# Patient Record
Sex: FEMALE | Race: OTHER | Employment: FULL TIME | URBAN - METROPOLITAN AREA
[De-identification: names, ages, dates, MRNs, and addresses within clinical notes are randomized per-mention and may not be internally consistent; named-entity substitution may affect disease eponyms.]

---

## 2019-01-10 ENCOUNTER — LAB ENCOUNTER (OUTPATIENT)
Dept: LAB | Facility: REFERENCE LAB | Age: 39
End: 2019-01-10
Attending: OBSTETRICS & GYNECOLOGY
Payer: COMMERCIAL

## 2019-01-10 ENCOUNTER — INITIAL PRENATAL (OUTPATIENT)
Dept: OBGYN CLINIC | Facility: CLINIC | Age: 39
End: 2019-01-10

## 2019-01-10 VITALS
HEIGHT: 62 IN | DIASTOLIC BLOOD PRESSURE: 80 MMHG | WEIGHT: 252.81 LBS | SYSTOLIC BLOOD PRESSURE: 116 MMHG | BODY MASS INDEX: 46.52 KG/M2

## 2019-01-10 DIAGNOSIS — Z36.9 UNSPECIFIED ANTENATAL SCREENING: ICD-10-CM

## 2019-01-10 DIAGNOSIS — E28.2 PCOS (POLYCYSTIC OVARIAN SYNDROME): ICD-10-CM

## 2019-01-10 DIAGNOSIS — O09.519 SUPERVISION OF HIGH-RISK PREGNANCY OF ELDERLY PRIMIGRAVIDA: ICD-10-CM

## 2019-01-10 DIAGNOSIS — K58.9 IRRITABLE BOWEL SYNDROME, UNSPECIFIED TYPE: ICD-10-CM

## 2019-01-10 DIAGNOSIS — Z11.3 SCREENING EXAMINATION FOR VENEREAL DISEASE: ICD-10-CM

## 2019-01-10 DIAGNOSIS — Z12.4 SCREENING FOR MALIGNANT NEOPLASM OF THE CERVIX: ICD-10-CM

## 2019-01-10 DIAGNOSIS — O16.1 HYPERTENSION AFFECTING PREGNANCY IN FIRST TRIMESTER: ICD-10-CM

## 2019-01-10 DIAGNOSIS — E66.01 OBESITY, CLASS III, BMI 40-49.9 (MORBID OBESITY) (HCC): ICD-10-CM

## 2019-01-10 DIAGNOSIS — O09.811 PREGNANCY RESULTING FROM IN VITRO FERTILIZATION IN FIRST TRIMESTER: ICD-10-CM

## 2019-01-10 DIAGNOSIS — Z36.9 UNSPECIFIED ANTENATAL SCREENING: Primary | ICD-10-CM

## 2019-01-10 PROBLEM — E66.813 OBESITY, CLASS III, BMI 40-49.9 (MORBID OBESITY): Status: ACTIVE | Noted: 2019-01-10

## 2019-01-10 LAB
ANTIBODY SCREEN: NEGATIVE
APPEARANCE: CLEAR
BASOPHILS # BLD: 0.1 K/UL (ref 0–0.2)
BASOPHILS NFR BLD: 1 %
EOSINOPHIL # BLD: 0.2 K/UL (ref 0–0.7)
EOSINOPHIL NFR BLD: 3 %
ERYTHROCYTE [DISTWIDTH] IN BLOOD BY AUTOMATED COUNT: 13.3 % (ref 11–15)
EST. AVERAGE GLUCOSE BLD GHB EST-MCNC: 111 MG/DL (ref 68–126)
GLUCOSE 1H P 50 G GLC PO SERPL-MCNC: 170 MG/DL
HBA1C MFR BLD HPLC: 5.5 % (ref ?–5.7)
HCT VFR BLD AUTO: 36.1 % (ref 35–48)
HGB BLD-MCNC: 11.9 G/DL (ref 12–16)
LYMPHOCYTES # BLD: 1.9 K/UL (ref 1–4)
LYMPHOCYTES NFR BLD: 20 %
MCH RBC QN AUTO: 29.5 PG (ref 27–32)
MCHC RBC AUTO-ENTMCNC: 32.9 G/DL (ref 32–37)
MCV RBC AUTO: 89.7 FL (ref 80–100)
MONOCYTES # BLD: 0.4 K/UL (ref 0–1)
MONOCYTES NFR BLD: 5 %
MULTISTIX LOT#: NORMAL NUMERIC
NEUTROPHILS # BLD AUTO: 6.8 K/UL (ref 1.8–7.7)
NEUTROPHILS NFR BLD: 72 %
PH, URINE: 5.5 (ref 4.5–8)
PLATELET # BLD AUTO: 221 K/UL (ref 140–400)
PMV BLD AUTO: 8.4 FL (ref 7.4–10.3)
RBC # BLD AUTO: 4.02 M/UL (ref 3.7–5.4)
RH BLOOD TYPE: POSITIVE
RUBV IGG SER-ACNC: 16.1 IU/ML
SPECIFIC GRAVITY: 1.03 (ref 1–1.03)
URINE-COLOR: YELLOW
UROBILINOGEN,SEMI-QN: 0.2 MG/DL (ref 0–1.9)
WBC # BLD AUTO: 9.5 K/UL (ref 4–11)

## 2019-01-10 PROCEDURE — 86901 BLOOD TYPING SEROLOGIC RH(D): CPT | Performed by: OBSTETRICS & GYNECOLOGY

## 2019-01-10 PROCEDURE — 87591 N.GONORRHOEAE DNA AMP PROB: CPT | Performed by: OBSTETRICS & GYNECOLOGY

## 2019-01-10 PROCEDURE — 86850 RBC ANTIBODY SCREEN: CPT

## 2019-01-10 PROCEDURE — 85025 COMPLETE CBC W/AUTO DIFF WBC: CPT

## 2019-01-10 PROCEDURE — 86762 RUBELLA ANTIBODY: CPT

## 2019-01-10 PROCEDURE — 87340 HEPATITIS B SURFACE AG IA: CPT

## 2019-01-10 PROCEDURE — 81002 URINALYSIS NONAUTO W/O SCOPE: CPT | Performed by: OBSTETRICS & GYNECOLOGY

## 2019-01-10 PROCEDURE — 88175 CYTOPATH C/V AUTO FLUID REDO: CPT | Performed by: OBSTETRICS & GYNECOLOGY

## 2019-01-10 PROCEDURE — 82950 GLUCOSE TEST: CPT

## 2019-01-10 PROCEDURE — 87086 URINE CULTURE/COLONY COUNT: CPT | Performed by: OBSTETRICS & GYNECOLOGY

## 2019-01-10 PROCEDURE — 36415 COLL VENOUS BLD VENIPUNCTURE: CPT

## 2019-01-10 PROCEDURE — 86780 TREPONEMA PALLIDUM: CPT

## 2019-01-10 PROCEDURE — 87491 CHLMYD TRACH DNA AMP PROBE: CPT | Performed by: OBSTETRICS & GYNECOLOGY

## 2019-01-10 PROCEDURE — 86900 BLOOD TYPING SEROLOGIC ABO: CPT | Performed by: OBSTETRICS & GYNECOLOGY

## 2019-01-10 PROCEDURE — 83036 HEMOGLOBIN GLYCOSYLATED A1C: CPT

## 2019-01-10 PROCEDURE — 87624 HPV HI-RISK TYP POOLED RSLT: CPT | Performed by: OBSTETRICS & GYNECOLOGY

## 2019-01-10 PROCEDURE — 87389 HIV-1 AG W/HIV-1&-2 AB AG IA: CPT

## 2019-01-10 RX ORDER — METFORMIN HYDROCHLORIDE 500 MG/1
500 TABLET, EXTENDED RELEASE ORAL 2 TIMES DAILY WITH MEALS
COMMUNITY
Start: 2018-11-27 | End: 2019-04-19

## 2019-01-10 RX ORDER — LABETALOL 200 MG/1
TABLET, FILM COATED ORAL
COMMUNITY
Start: 2018-11-05 | End: 2019-01-10

## 2019-01-10 RX ORDER — LABETALOL 300 MG/1
TABLET, FILM COATED ORAL
Qty: 90 TABLET | Refills: 3 | Status: SHIPPED | OUTPATIENT
Start: 2019-01-10 | End: 2019-04-19

## 2019-01-11 ENCOUNTER — TELEPHONE (OUTPATIENT)
Dept: OBGYN CLINIC | Facility: CLINIC | Age: 39
End: 2019-01-11

## 2019-01-11 LAB
C TRACH DNA SPEC QL NAA+PROBE: NEGATIVE
HBV SURFACE AG SERPL QL IA: NONREACTIVE
HIV1+2 AB SERPL QL IA: NONREACTIVE
HPV I/H RISK 1 DNA SPEC QL NAA+PROBE: NEGATIVE
N GONORRHOEA DNA SPEC QL NAA+PROBE: NEGATIVE
T PALLIDUM AB SER QL: NEGATIVE

## 2019-01-11 NOTE — PROGRESS NOTES
Spoke with pt results of 1 hour gtt given to pt with instructions from Dr Enrico Alfaro for pt to see Endocrinologist to discuss beginning the diabetic diet and to review the best treatment options for your gestational diabetes throughout the pregnancy.  Pt voice

## 2019-01-11 NOTE — TELEPHONE ENCOUNTER
Spoke with pt results of 1 hour gtt given to pt with instructions from Dr Irving Bowles for pt to see Endocrinologist to discuss beginning the diabetic diet and to review the best treatment options for your gestational diabetes throughout the pregnancy.  Pt voice

## 2019-01-11 NOTE — TELEPHONE ENCOUNTER
Notes recorded by Chuck Shin MD on 1/11/2019 at 11:05 AM CST  Your lab results show an abnormal glucose tolerance test despite being on Metformin with a normal HgA1C.    Please schedule an appointment with your Endocrinologist to discuss beginning t

## 2019-01-15 ENCOUNTER — TELEPHONE (OUTPATIENT)
Dept: OBGYN CLINIC | Facility: CLINIC | Age: 39
End: 2019-01-15

## 2019-01-15 NOTE — TELEPHONE ENCOUNTER
Spoke with patient states her endo Dr Shelbi Rogers told her she needs to see a nutritionist. Pt has upcoming appointment with Dr Sherry Deng on 01/23/2019 will wait until then to discuss this with Dr MEADOWS as pt will also need to change endo because of insurance.  Pt

## 2019-01-23 ENCOUNTER — ROUTINE PRENATAL (OUTPATIENT)
Dept: OBGYN CLINIC | Facility: CLINIC | Age: 39
End: 2019-01-23

## 2019-01-23 VITALS
BODY MASS INDEX: 46.76 KG/M2 | WEIGHT: 254.13 LBS | HEIGHT: 62 IN | DIASTOLIC BLOOD PRESSURE: 88 MMHG | SYSTOLIC BLOOD PRESSURE: 130 MMHG

## 2019-01-23 DIAGNOSIS — O26.899 PREGNANCY COMPLICATED BY INSULIN RESISTANCE: ICD-10-CM

## 2019-01-23 DIAGNOSIS — E28.2 PCOS (POLYCYSTIC OVARIAN SYNDROME): ICD-10-CM

## 2019-01-23 DIAGNOSIS — N89.8 VAGINAL DISCHARGE: ICD-10-CM

## 2019-01-23 DIAGNOSIS — E88.81 INSULIN RESISTANCE: Primary | ICD-10-CM

## 2019-01-23 DIAGNOSIS — O09.519 SUPERVISION OF HIGH-RISK PREGNANCY OF ELDERLY PRIMIGRAVIDA: ICD-10-CM

## 2019-01-23 DIAGNOSIS — O09.511 PRIMIGRAVIDA OF ADVANCED MATERNAL AGE IN FIRST TRIMESTER: ICD-10-CM

## 2019-01-23 PROBLEM — E88.819: Status: ACTIVE | Noted: 2019-01-23

## 2019-01-23 PROBLEM — E88.819 PREGNANCY COMPLICATED BY INSULIN RESISTANCE: Status: ACTIVE | Noted: 2019-01-23

## 2019-01-23 PROCEDURE — 87106 FUNGI IDENTIFICATION YEAST: CPT | Performed by: OBSTETRICS & GYNECOLOGY

## 2019-01-23 PROCEDURE — 87808 TRICHOMONAS ASSAY W/OPTIC: CPT | Performed by: OBSTETRICS & GYNECOLOGY

## 2019-01-23 PROCEDURE — 87205 SMEAR GRAM STAIN: CPT | Performed by: OBSTETRICS & GYNECOLOGY

## 2019-01-23 RX ORDER — MECLIZINE HCL 12.5 MG/1
TABLET ORAL
COMMUNITY
Start: 2019-01-20 | End: 2019-02-14

## 2019-01-23 RX ORDER — CEPHALEXIN 500 MG/1
CAPSULE ORAL
COMMUNITY
Start: 2019-01-20 | End: 2019-02-14

## 2019-01-24 NOTE — PROGRESS NOTES
Reviewed results of failed early GTT of 170 and normal A1C consistent with insulin resistance and need for diabetic diet and glucose monitoring.  Referral for teaching and nutritionist done as well as referral to [facility] Dr. Aixa Beckford

## 2019-01-26 LAB
GENITAL VAGINOSIS SCREEN: NEGATIVE
TRICHOMONAS SCREEN: NEGATIVE

## 2019-02-12 ENCOUNTER — HOSPITAL ENCOUNTER (OUTPATIENT)
Dept: NUTRITION | Facility: HOSPITAL | Age: 39
Discharge: HOME | End: 2019-02-12
Attending: OBSTETRICS & GYNECOLOGY
Payer: COMMERCIAL

## 2019-02-12 DIAGNOSIS — O09.511 PRIMIGRAVIDA OF ADVANCED MATERNAL AGE IN FIRST TRIMESTER: ICD-10-CM

## 2019-02-12 DIAGNOSIS — E88.81 INSULIN RESISTANCE: ICD-10-CM

## 2019-02-12 PROCEDURE — 97802 MEDICAL NUTRITION INDIV IN: CPT | Performed by: DIETITIAN, REGISTERED

## 2019-02-12 NOTE — PROGRESS NOTES
Nutrition Assessment    Nilesh Duong is a 43year old female. Referred by:  Attending  Referring Physician Name: Mitesh Mcmahon Nutrition Therapy Comment: Insulin Resistance  Visit Information: Initial Visit 2/12/19    [facility] Education: Priority Modification  Nutrition/Diet Handouts Given: Diabetes Handout:  MNT for Diabetes and Other Gestation Diabetes packet, [id number] calorie meal plan, Reading Food Labels, Blood Glucose Monitoring log sheets      NUTRITION PRESCRIPTION:

## 2019-02-14 ENCOUNTER — ROUTINE PRENATAL (OUTPATIENT)
Dept: OBGYN CLINIC | Facility: CLINIC | Age: 39
End: 2019-02-14

## 2019-02-14 ENCOUNTER — TELEPHONE (OUTPATIENT)
Dept: OBGYN CLINIC | Facility: CLINIC | Age: 39
End: 2019-02-14

## 2019-02-14 VITALS
SYSTOLIC BLOOD PRESSURE: 128 MMHG | BODY MASS INDEX: 46.9 KG/M2 | WEIGHT: 254.88 LBS | HEIGHT: 62 IN | DIASTOLIC BLOOD PRESSURE: 80 MMHG

## 2019-02-14 DIAGNOSIS — O09.519 SUPERVISION OF HIGH-RISK PREGNANCY OF ELDERLY PRIMIGRAVIDA: Primary | ICD-10-CM

## 2019-02-14 DIAGNOSIS — O26.899 PREGNANCY COMPLICATED BY INSULIN RESISTANCE: ICD-10-CM

## 2019-02-14 RX ORDER — PYRIDOXINE HCL (VITAMIN B6) 25 MG
TABLET ORAL
COMMUNITY
Start: 2019-02-04 | End: 2019-07-16

## 2019-02-14 NOTE — PROGRESS NOTES
Seen by Nutritionist for diabetic teaching - has appointment with Dr. Heidi Desir next week. Rx given for glucometer, strips, and lancets. For 20w USN next.

## 2019-02-22 ENCOUNTER — OFFICE VISIT (OUTPATIENT)
Dept: ENDOCRINOLOGY CLINIC | Facility: CLINIC | Age: 39
End: 2019-02-22

## 2019-02-22 VITALS
HEART RATE: 91 BPM | DIASTOLIC BLOOD PRESSURE: 80 MMHG | HEIGHT: 62 IN | WEIGHT: 253.63 LBS | SYSTOLIC BLOOD PRESSURE: 123 MMHG | BODY MASS INDEX: 46.67 KG/M2

## 2019-02-22 DIAGNOSIS — R73.09 ABNORMAL ORAL GLUCOSE TOLERANCE TEST: ICD-10-CM

## 2019-02-22 DIAGNOSIS — E28.2 PCOS (POLYCYSTIC OVARIAN SYNDROME): Primary | ICD-10-CM

## 2019-02-22 DIAGNOSIS — O26.899 PREGNANCY COMPLICATED BY INSULIN RESISTANCE: ICD-10-CM

## 2019-02-22 LAB
CARTRIDGE LOT#: NORMAL NUMERIC
GLUCOSE BLOOD: 134
HEMOGLOBIN A1C: 5.5 % (ref 4.3–5.6)
TEST STRIP LOT #: NORMAL NUMERIC

## 2019-02-22 PROCEDURE — 99244 OFF/OP CNSLTJ NEW/EST MOD 40: CPT | Performed by: INTERNAL MEDICINE

## 2019-02-22 PROCEDURE — 83036 HEMOGLOBIN GLYCOSYLATED A1C: CPT | Performed by: INTERNAL MEDICINE

## 2019-02-22 PROCEDURE — 99212 OFFICE O/P EST SF 10 MIN: CPT | Performed by: INTERNAL MEDICINE

## 2019-02-22 PROCEDURE — 82962 GLUCOSE BLOOD TEST: CPT | Performed by: INTERNAL MEDICINE

## 2019-02-22 PROCEDURE — 36416 COLLJ CAPILLARY BLOOD SPEC: CPT | Performed by: INTERNAL MEDICINE

## 2019-02-22 NOTE — H&P
New Patient Evaluation - History and Physical    CONSULT - Reason for Visit:  Insulin Resistance and PCOS, abnormal OGTT  Requesting Physician: JOSE Barry    CHIEF COMPLAINT:  Patient presents with:  Consult: Insulin resistant, A1C, 16 weeks pregnant.  Pcos Narrative      No H/O abuse       FAMILY HISTORY:   Family History   Problem Relation Age of Onset   • Thyroid Disorder Father    • Diabetes Father    • Diabetes Mother    • Hypertension Mother    • No Known Problems Maternal Grandmother    • No Known Prob She has an abnormal one hour OGTT.    Discussed the effects of pregnancy on blood glucose with increased insulin sensitivity (and hence increased risk of hypoglycemia) in the first trimester followed by increase in insulin resistance and hence insulin req

## 2019-03-13 DIAGNOSIS — Z3A.20 20 WEEKS GESTATION OF PREGNANCY: Primary | ICD-10-CM

## 2019-03-25 ENCOUNTER — ULTRASOUND ENCOUNTER (OUTPATIENT)
Dept: OBGYN CLINIC | Facility: CLINIC | Age: 39
End: 2019-03-25

## 2019-03-25 DIAGNOSIS — O26.899 PREGNANCY COMPLICATED BY INSULIN RESISTANCE: ICD-10-CM

## 2019-03-25 DIAGNOSIS — O09.519 SUPERVISION OF HIGH-RISK PREGNANCY OF ELDERLY PRIMIGRAVIDA: ICD-10-CM

## 2019-03-25 PROCEDURE — 76805 OB US >/= 14 WKS SNGL FETUS: CPT | Performed by: OBSTETRICS & GYNECOLOGY

## 2019-03-25 PROCEDURE — 76817 TRANSVAGINAL US OBSTETRIC: CPT | Performed by: OBSTETRICS & GYNECOLOGY

## 2019-03-28 ENCOUNTER — ROUTINE PRENATAL (OUTPATIENT)
Dept: OBGYN CLINIC | Facility: CLINIC | Age: 39
End: 2019-03-28

## 2019-03-28 VITALS — WEIGHT: 255 LBS | BODY MASS INDEX: 47 KG/M2 | SYSTOLIC BLOOD PRESSURE: 124 MMHG | DIASTOLIC BLOOD PRESSURE: 60 MMHG

## 2019-03-28 DIAGNOSIS — O09.519 SUPERVISION OF HIGH-RISK PREGNANCY OF ELDERLY PRIMIGRAVIDA: ICD-10-CM

## 2019-03-28 DIAGNOSIS — O16.1 HYPERTENSION AFFECTING PREGNANCY IN FIRST TRIMESTER: Primary | ICD-10-CM

## 2019-03-28 DIAGNOSIS — O24.919 DIABETES MELLITUS DURING PREGNANCY TREATED WITH ORAL HYPOGLYCEMIC THERAPY: ICD-10-CM

## 2019-03-28 DIAGNOSIS — Z79.84 DIABETES MELLITUS DURING PREGNANCY TREATED WITH ORAL HYPOGLYCEMIC THERAPY: ICD-10-CM

## 2019-03-28 RX ORDER — LANCETS
EACH MISCELLANEOUS
COMMUNITY
Start: 2019-03-23 | End: 2021-08-03

## 2019-03-28 RX ORDER — BLOOD SUGAR DIAGNOSTIC
STRIP MISCELLANEOUS
Status: ON HOLD | COMMUNITY
Start: 2019-03-23 | End: 2019-07-23

## 2019-03-28 NOTE — PROGRESS NOTES
Doing well, detecting FM. Had normal Level I survey 3/25/19 with sub-optimal views of heart, lower spine, and lip. BPs have been stable. Sugars reviewed - overall stable on Metformin except FBS in 100s.  Will stop evening gummy PNV and discuss about timing

## 2019-04-10 ENCOUNTER — HOSPITAL ENCOUNTER (OUTPATIENT)
Dept: PERINATAL CARE | Facility: HOSPITAL | Age: 39
Discharge: HOME | End: 2019-04-10
Attending: OBSTETRICS & GYNECOLOGY
Payer: COMMERCIAL

## 2019-04-10 VITALS
WEIGHT: 255 LBS | DIASTOLIC BLOOD PRESSURE: 78 MMHG | BODY MASS INDEX: 47 KG/M2 | HEART RATE: 103 BPM | SYSTOLIC BLOOD PRESSURE: 114 MMHG

## 2019-04-10 DIAGNOSIS — Z79.84 DIABETES MELLITUS DURING PREGNANCY TREATED WITH ORAL HYPOGLYCEMIC THERAPY: ICD-10-CM

## 2019-04-10 DIAGNOSIS — O16.2 HYPERTENSION AFFECTING PREGNANCY IN SECOND TRIMESTER: ICD-10-CM

## 2019-04-10 DIAGNOSIS — O09.512 PRIMIGRAVIDA OF ADVANCED MATERNAL AGE IN SECOND TRIMESTER: ICD-10-CM

## 2019-04-10 DIAGNOSIS — O26.899 PREGNANCY COMPLICATED BY INSULIN RESISTANCE: ICD-10-CM

## 2019-04-10 DIAGNOSIS — O09.519 SUPERVISION OF HIGH-RISK PREGNANCY OF ELDERLY PRIMIGRAVIDA: ICD-10-CM

## 2019-04-10 DIAGNOSIS — O09.519 SUPERVISION OF HIGH-RISK PREGNANCY OF ELDERLY PRIMIGRAVIDA: Primary | ICD-10-CM

## 2019-04-10 DIAGNOSIS — O24.919 DIABETES MELLITUS DURING PREGNANCY TREATED WITH ORAL HYPOGLYCEMIC THERAPY: ICD-10-CM

## 2019-04-10 DIAGNOSIS — O09.812 PREGNANCY RESULTING FROM ASSISTED REPRODUCTIVE TECHNOLOGY IN SECOND TRIMESTER: ICD-10-CM

## 2019-04-10 DIAGNOSIS — E66.01 OBESITY, CLASS III, BMI 40-49.9 (MORBID OBESITY) (HCC): ICD-10-CM

## 2019-04-10 DIAGNOSIS — E28.2 PCOS (POLYCYSTIC OVARIAN SYNDROME): ICD-10-CM

## 2019-04-10 PROCEDURE — 76811 OB US DETAILED SNGL FETUS: CPT | Performed by: OBSTETRICS & GYNECOLOGY

## 2019-04-10 PROCEDURE — 99244 OFF/OP CNSLTJ NEW/EST MOD 40: CPT | Performed by: OBSTETRICS & GYNECOLOGY

## 2019-04-10 NOTE — PROGRESS NOTES
Reason for Consult:   Dear Dr. Tresea Goltz you for requesting ultrasound evaluation and maternal fetal medicine consultation on Hollie Kirby. As you are aware she is a 35year old female with a Gordon pregnancy at 22w6d.   A maternal-fetal m Frequency: Never    Drug use: No       NARRATIVE:     /78   Pulse 103   Wt 255 lb (115.7 kg)   LMP 11/01/2018 (Exact Date)   BMI 46.64 kg/m²           Alert and Oriented. No acute distress          Abdomen:  soft, nontender, no contractions noted. women over age 48. Women 28years of age or older are more likely to be delivered by .  The  delivery rate in the general obstetric population of the [facility] is almost 30 percent, compared to almost 48 percent in women over age 36 to abnormalities associated with advanced maternal age at age 35year old. She understands that ultrasound exam cannot exclude potential genetic abnormalities.   Her estimated risk based on maternal age at amniocentesis with any chromosome abnormality is about metformin therapy on the primary endpoint of pregnancy loss. We discussed at length the potential implications associated with chronic hypertension.    I informed the patient that chronic hypertension increases the risk of pre-eclampsia, placen 60 - 95, and 1 hour postptandial values of less than 140, or 2 hour postprandial values of less than 120. Blood sugars should be check at least 4 times daily.         Conception by IVF is associated with an increased incidence of several obstetrical and pe obesity is associated with decreasing spontaneous pregnancy rates. The increased risk of miscarriage in obese women may be because such women often have PCOS or isolated insulin resistance.                  Due to its strong association with obesity in the thromboembolism, and endometritis. Maternal obesity appears to be associated with a small increase in the absolute rate of some congenital anomalies, and the risk may increase with increasing maternal weight.   The risk of neural tube defects inc fetal structural abnormalities seen  4. AMA  5. IVF  6. Type II diabetes- managed by OB office  7. Chronic hypertension  8. Morbid obesity    RECOMMENDATIONS:     1. Weekly NST at  32  wks / twice weekly at 34   wks     2. Monthly growth US    3.   Ba

## 2019-04-18 ENCOUNTER — PATIENT MESSAGE (OUTPATIENT)
Dept: OBGYN CLINIC | Facility: CLINIC | Age: 39
End: 2019-04-18

## 2019-04-19 RX ORDER — LABETALOL 300 MG/1
TABLET, FILM COATED ORAL
Qty: 90 TABLET | Refills: 3 | Status: SHIPPED | OUTPATIENT
Start: 2019-04-19 | End: 2019-06-04

## 2019-04-19 RX ORDER — METFORMIN HYDROCHLORIDE 500 MG/1
500 TABLET, EXTENDED RELEASE ORAL 2 TIMES DAILY WITH MEALS
Qty: 180 TABLET | Refills: 3 | Status: SHIPPED | OUTPATIENT
Start: 2019-04-19 | End: 2019-05-31

## 2019-04-19 NOTE — TELEPHONE ENCOUNTER
From: Calleen Dancer  To: Savannah Lopez MD  Sent: 4/18/2019 4:32 PM CDT  Subject: Other    Hello, I am out of my Blood pressure medication!!! Help!!   The pharmacy says they tried to call and I sent a message earlier today.  I tried to call today but got

## 2019-04-19 NOTE — TELEPHONE ENCOUNTER
Spoke with patient states needs refill on Labetalol HCl 300 MG Oral Tab and MetFORMIN HCl  MG Oral Tablet 24 Hr. Per Dr Prieto Mancuso ok to refill both meds. Patient verified pharmacy and Rx sent over.

## 2019-04-26 ENCOUNTER — ROUTINE PRENATAL (OUTPATIENT)
Dept: OBGYN CLINIC | Facility: CLINIC | Age: 39
End: 2019-04-26

## 2019-04-26 VITALS
HEIGHT: 62 IN | BODY MASS INDEX: 47.79 KG/M2 | DIASTOLIC BLOOD PRESSURE: 80 MMHG | WEIGHT: 259.69 LBS | SYSTOLIC BLOOD PRESSURE: 122 MMHG

## 2019-04-26 DIAGNOSIS — O92.79: ICD-10-CM

## 2019-04-26 DIAGNOSIS — O16.1 HYPERTENSION AFFECTING PREGNANCY IN FIRST TRIMESTER: ICD-10-CM

## 2019-04-26 DIAGNOSIS — O16.9 HYPERTENSION AFFECTING PREGNANCY, ANTEPARTUM: ICD-10-CM

## 2019-04-26 DIAGNOSIS — O09.519 SUPERVISION OF HIGH-RISK PREGNANCY OF ELDERLY PRIMIGRAVIDA: Primary | ICD-10-CM

## 2019-04-26 DIAGNOSIS — Z79.84 DIABETES MELLITUS DURING PREGNANCY TREATED WITH ORAL HYPOGLYCEMIC THERAPY: ICD-10-CM

## 2019-04-26 DIAGNOSIS — O24.919 DIABETES MELLITUS DURING PREGNANCY TREATED WITH ORAL HYPOGLYCEMIC THERAPY: ICD-10-CM

## 2019-04-26 NOTE — PROGRESS NOTES
Isatu Self  Dear Dr. Jayy Moreno,     Thank you for requesting ultrasound evaluation and maternal fetal medicine consultation on your patient Savannah Haij.   As you are aware she is a 35year old female  with a [place] Fetus is cephalic. The placenta is anterior. I reviewed the ultrasound with the patient. See imaging tab for full ultrasound report.     See the imaging tab for the PACS report     DISCUSSION  During her visit we discussed and reviewed the following is hypoglycemia and hyperbilirubinemia. Congenital Malformations:  Data from multiple studies have consistently shown a higher risk of major congenital malformations and miscarriage associated with increasing first trimester glycated hemoglobin values.  Alt and/or superimposed preeclampsia. It is associated with increased fetal and  morbidity and mortality, and has long-term health implications.   If there is evidence of intrauterine growth restriction, which is uncommon, but often related to preeclamp per week from 34 weeks until delivery. In complicated patients (IUGR, oligohydramnios, preeclampsia, or poorly controlled blood glucose concentrations) testing may start earlier in gestation and is performed more frequently.  Any significant deterioration i random 1 or 2-hour after meals. Her overall glucose control is Not able to be determined. The patient is presently on diet therapy; her compliance with her diabetic diet regimen was reviewed and it is adequate.      The patient is presently taking metf drug prevents progression to more severe disease; use of any drug in this setting is not recommended. BP Review  The patient is presently Labetalol taking anti-hypertensive medications.   Her blood pressure readings at home have been 110-120/70's.

## 2019-04-26 NOTE — PROGRESS NOTES
Doing well with active FM. BP and BS levels stable. Seen by MFM 4/10/2019 at 22.6w and had normal Level II USN and consultation for HTN, DM, obesity, and IVF pregnancy.  Recommendations made and orders done for baseline 24 hour urine, EKG, Fetal ECHO, and O

## 2019-04-29 ENCOUNTER — LAB ENCOUNTER (OUTPATIENT)
Dept: LAB | Facility: REFERENCE LAB | Age: 39
End: 2019-04-29
Attending: OBSTETRICS & GYNECOLOGY
Payer: COMMERCIAL

## 2019-04-29 ENCOUNTER — TELEPHONE (OUTPATIENT)
Dept: OPHTHALMOLOGY | Facility: CLINIC | Age: 39
End: 2019-04-29

## 2019-04-29 ENCOUNTER — LAB ENCOUNTER (OUTPATIENT)
Dept: LAB | Age: 39
End: 2019-04-29
Attending: OBSTETRICS & GYNECOLOGY
Payer: COMMERCIAL

## 2019-04-29 DIAGNOSIS — O09.519 SUPERVISION OF HIGH-RISK PREGNANCY OF ELDERLY PRIMIGRAVIDA: ICD-10-CM

## 2019-04-29 DIAGNOSIS — O09.519 SUPERVISION OF HIGH-RISK PREGNANCY OF ELDERLY PRIMIGRAVIDA: Primary | ICD-10-CM

## 2019-04-29 DIAGNOSIS — O16.9 HYPERTENSION AFFECTING PREGNANCY, ANTEPARTUM: ICD-10-CM

## 2019-04-29 DIAGNOSIS — O24.919 DIABETES MELLITUS DURING PREGNANCY TREATED WITH ORAL HYPOGLYCEMIC THERAPY: ICD-10-CM

## 2019-04-29 DIAGNOSIS — O24.919 DIABETES MELLITUS DURING PREGNANCY TREATED WITH ORAL HYPOGLYCEMIC THERAPY: Primary | ICD-10-CM

## 2019-04-29 DIAGNOSIS — Z79.84 DIABETES MELLITUS DURING PREGNANCY TREATED WITH ORAL HYPOGLYCEMIC THERAPY: Primary | ICD-10-CM

## 2019-04-29 DIAGNOSIS — Z79.84 DIABETES MELLITUS DURING PREGNANCY TREATED WITH ORAL HYPOGLYCEMIC THERAPY: ICD-10-CM

## 2019-04-29 PROCEDURE — 82575 CREATININE CLEARANCE TEST: CPT

## 2019-04-29 PROCEDURE — 36415 COLL VENOUS BLD VENIPUNCTURE: CPT | Performed by: OBSTETRICS & GYNECOLOGY

## 2019-04-29 PROCEDURE — 93010 ELECTROCARDIOGRAM REPORT: CPT | Performed by: OBSTETRICS & GYNECOLOGY

## 2019-04-29 PROCEDURE — 82565 ASSAY OF CREATININE: CPT | Performed by: OBSTETRICS & GYNECOLOGY

## 2019-04-29 PROCEDURE — 84156 ASSAY OF PROTEIN URINE: CPT | Performed by: OBSTETRICS & GYNECOLOGY

## 2019-04-29 PROCEDURE — 82565 ASSAY OF CREATININE: CPT

## 2019-04-29 PROCEDURE — 93005 ELECTROCARDIOGRAM TRACING: CPT

## 2019-05-08 ENCOUNTER — TELEPHONE (OUTPATIENT)
Dept: FAMILY MEDICINE CLINIC | Facility: CLINIC | Age: 39
End: 2019-05-08

## 2019-05-08 ENCOUNTER — TELEPHONE (OUTPATIENT)
Dept: OBGYN CLINIC | Facility: CLINIC | Age: 39
End: 2019-05-08

## 2019-05-10 ENCOUNTER — HOSPITAL ENCOUNTER (OUTPATIENT)
Dept: PERINATAL CARE | Facility: HOSPITAL | Age: 39
Discharge: HOME | End: 2019-05-10
Attending: OBSTETRICS & GYNECOLOGY
Payer: COMMERCIAL

## 2019-05-10 VITALS
HEART RATE: 89 BPM | BODY MASS INDEX: 48 KG/M2 | DIASTOLIC BLOOD PRESSURE: 77 MMHG | SYSTOLIC BLOOD PRESSURE: 131 MMHG | WEIGHT: 260 LBS

## 2019-05-10 DIAGNOSIS — O26.899 PREGNANCY COMPLICATED BY INSULIN RESISTANCE: ICD-10-CM

## 2019-05-10 DIAGNOSIS — O16.1 HYPERTENSION AFFECTING PREGNANCY IN FIRST TRIMESTER: ICD-10-CM

## 2019-05-10 DIAGNOSIS — O09.812 PREGNANCY RESULTING FROM ASSISTED REPRODUCTIVE TECHNOLOGY IN SECOND TRIMESTER: ICD-10-CM

## 2019-05-10 DIAGNOSIS — O24.919 DIABETES MELLITUS DURING PREGNANCY TREATED WITH ORAL HYPOGLYCEMIC THERAPY: ICD-10-CM

## 2019-05-10 DIAGNOSIS — E66.01 OBESITY, CLASS III, BMI 40-49.9 (MORBID OBESITY) (HCC): ICD-10-CM

## 2019-05-10 DIAGNOSIS — Z79.84 DIABETES MELLITUS DURING PREGNANCY TREATED WITH ORAL HYPOGLYCEMIC THERAPY: ICD-10-CM

## 2019-05-10 DIAGNOSIS — O09.811 PREGNANCY RESULTING FROM IN VITRO FERTILIZATION IN FIRST TRIMESTER: ICD-10-CM

## 2019-05-10 DIAGNOSIS — O09.812 PREGNANCY RESULTING FROM ASSISTED REPRODUCTIVE TECHNOLOGY IN SECOND TRIMESTER: Primary | ICD-10-CM

## 2019-05-10 PROCEDURE — 93325 DOPPLER ECHO COLOR FLOW MAPG: CPT | Performed by: OBSTETRICS & GYNECOLOGY

## 2019-05-10 PROCEDURE — 76825 ECHO EXAM OF FETAL HEART: CPT | Performed by: OBSTETRICS & GYNECOLOGY

## 2019-05-10 PROCEDURE — 76827 ECHO EXAM OF FETAL HEART: CPT | Performed by: OBSTETRICS & GYNECOLOGY

## 2019-05-10 PROCEDURE — 99215 OFFICE O/P EST HI 40 MIN: CPT | Performed by: OBSTETRICS & GYNECOLOGY

## 2019-05-10 NOTE — ADDENDUM NOTE
Encounter addended by: Philippe Conte on: 5/10/2019 2:48 PM   Actions taken: Charge Capture section accepted

## 2019-05-14 ENCOUNTER — OFFICE VISIT (OUTPATIENT)
Dept: OPHTHALMOLOGY | Facility: CLINIC | Age: 39
End: 2019-05-14

## 2019-05-14 DIAGNOSIS — Z79.84 DIABETES MELLITUS DURING PREGNANCY TREATED WITH ORAL HYPOGLYCEMIC THERAPY: Primary | ICD-10-CM

## 2019-05-14 DIAGNOSIS — E28.2 PCOS (POLYCYSTIC OVARIAN SYNDROME): ICD-10-CM

## 2019-05-14 DIAGNOSIS — H43.393 FLOATERS IN VISUAL FIELD, BILATERAL: ICD-10-CM

## 2019-05-14 DIAGNOSIS — O24.919 DIABETES MELLITUS DURING PREGNANCY TREATED WITH ORAL HYPOGLYCEMIC THERAPY: Primary | ICD-10-CM

## 2019-05-14 PROCEDURE — 99212 OFFICE O/P EST SF 10 MIN: CPT | Performed by: OPHTHALMOLOGY

## 2019-05-14 PROCEDURE — 99243 OFF/OP CNSLTJ NEW/EST LOW 30: CPT | Performed by: OPHTHALMOLOGY

## 2019-05-14 NOTE — PATIENT INSTRUCTIONS
PCOS (polycystic ovarian syndrome)  Continue Metformin as directed by PCP. Diabetes mellitus during pregnancy treated with oral hypoglycemic therapy  No diabetes found in either eye today.  No background of retinopathy, no signs of neovascularization no

## 2019-05-14 NOTE — PROGRESS NOTES
Enrique Munroe is a 35year old female. HPI:     HPI     Consult      Additional comments: Consult per Dr. Maulik Kelly     NP.   Consult per Dr. Evonne Whitehead  Patient is here for a dilated eye exam due to concerns about her devel metFORMIN HCl  MG Oral Tablet 24 Hr Take 1 tablet (500 mg total) by mouth 2 (two) times daily with meals.  (Patient taking differently: Take 1,000 mg by mouth nightly.  ) Disp: 180 tablet Rfl: 3   ACCU-CHEK FASTCLIX LANCETS Does not apply Misc  Disp Right -8.50 +0.50 021    Left -9.00 +1.00 096    Type:  Single vision                 ASSESSMENT/PLAN:     Diagnoses and Plan:     PCOS (polycystic ovarian syndrome)  Continue Metformin as directed by PCP.      Diabetes mellitus during pregnancy treated wit

## 2019-05-14 NOTE — ASSESSMENT & PLAN NOTE
No diabetes found in either eye today. No background of retinopathy, no signs of neovascularization noted. Discussed ocular and systemic benefits of blood sugar control.

## 2019-05-15 ENCOUNTER — ROUTINE PRENATAL (OUTPATIENT)
Dept: OBGYN CLINIC | Facility: CLINIC | Age: 39
End: 2019-05-15

## 2019-05-15 ENCOUNTER — TELEPHONE (OUTPATIENT)
Dept: OBGYN CLINIC | Facility: CLINIC | Age: 39
End: 2019-05-15

## 2019-05-15 VITALS
WEIGHT: 260.88 LBS | HEIGHT: 62 IN | SYSTOLIC BLOOD PRESSURE: 140 MMHG | DIASTOLIC BLOOD PRESSURE: 88 MMHG | BODY MASS INDEX: 48.01 KG/M2

## 2019-05-15 DIAGNOSIS — O16.1 HYPERTENSION AFFECTING PREGNANCY IN FIRST TRIMESTER: ICD-10-CM

## 2019-05-15 DIAGNOSIS — O09.519 SUPERVISION OF HIGH-RISK PREGNANCY OF ELDERLY PRIMIGRAVIDA: Primary | ICD-10-CM

## 2019-05-15 DIAGNOSIS — O24.919 DIABETES MELLITUS DURING PREGNANCY TREATED WITH ORAL HYPOGLYCEMIC THERAPY: ICD-10-CM

## 2019-05-15 DIAGNOSIS — Z79.84 DIABETES MELLITUS DURING PREGNANCY TREATED WITH ORAL HYPOGLYCEMIC THERAPY: ICD-10-CM

## 2019-05-15 NOTE — TELEPHONE ENCOUNTER
Patient would like Manuel Rg to call her about breast pump referral and FMLA forms that should state maternity leave starts 07/15/2019 at 36 wks.

## 2019-05-15 NOTE — PROGRESS NOTES
Active FM with no ob complaints. Seen by Ophtho with normal exam and by MFM for fetal ECHO which was normal and USN showing EFW 1204g. in 69% at 27.1w.  Has only been monitoring FBS levels all <95 with Dr. Kamille Carney but instructed patient it is now critical

## 2019-05-16 ENCOUNTER — TELEPHONE (OUTPATIENT)
Dept: OBGYN CLINIC | Facility: CLINIC | Age: 39
End: 2019-05-16

## 2019-05-16 RX ORDER — BREAST PUMP
EACH MISCELLANEOUS
Qty: 1 EACH | Refills: 0 | Status: SHIPPED | OUTPATIENT
Start: 2019-05-16 | End: 2021-08-03

## 2019-05-16 NOTE — TELEPHONE ENCOUNTER
Per pt, pt had one episode of blood (dark red) while urinating. Per pt, she urinated once since then and no blood. Pt reports +FM. Pt denies: any urinary sx such as dysuria. Pt denies recent intercourse.  Per pt, she drank about 24oz of water and about 8oz

## 2019-05-17 ENCOUNTER — TELEPHONE (OUTPATIENT)
Dept: OBGYN CLINIC | Facility: CLINIC | Age: 39
End: 2019-05-17

## 2019-05-17 RX ORDER — NIFEDIPINE 30 MG/1
30 TABLET, EXTENDED RELEASE ORAL DAILY
Qty: 90 TABLET | Refills: 1 | Status: SHIPPED | OUTPATIENT
Start: 2019-05-17 | End: 2019-05-28 | Stop reason: DRUGHIGH

## 2019-05-17 NOTE — TELEPHONE ENCOUNTER
Patient calling with B/P readings:  5/16 am 137/83  5/16 pm 159/83    5/17 am 157/83  5/17 pm 146/75    Glucose this morning was 104, this pm was 94    Glucose 5/16 am 98, then 118, then 110

## 2019-05-17 NOTE — TELEPHONE ENCOUNTER
Per EB, pt to start Procardia XL 30mg daily and stop labetalol; pt to come in on Monday for BP check. This RN advised pt to start Procardio tomorrow morning. Pt scheduled for RN visit on Monday for BP check and med ordered. Pt reports +FM.  Advised pt to ca

## 2019-05-20 ENCOUNTER — TELEPHONE (OUTPATIENT)
Dept: OBGYN CLINIC | Facility: CLINIC | Age: 39
End: 2019-05-20

## 2019-05-20 ENCOUNTER — HOSPITAL ENCOUNTER (OUTPATIENT)
Facility: HOSPITAL | Age: 39
Setting detail: OBSERVATION
Discharge: HOME | End: 2019-05-20
Attending: OBSTETRICS & GYNECOLOGY | Admitting: OBSTETRICS & GYNECOLOGY
Payer: COMMERCIAL

## 2019-05-20 ENCOUNTER — NURSE ONLY (OUTPATIENT)
Dept: OBGYN CLINIC | Facility: CLINIC | Age: 39
End: 2019-05-20

## 2019-05-20 VITALS — DIASTOLIC BLOOD PRESSURE: 83 MMHG | SYSTOLIC BLOOD PRESSURE: 153 MMHG | HEART RATE: 100 BPM

## 2019-05-20 VITALS — HEART RATE: 97 BPM | SYSTOLIC BLOOD PRESSURE: 150 MMHG | DIASTOLIC BLOOD PRESSURE: 100 MMHG

## 2019-05-20 PROBLEM — O16.3 ELEVATED BLOOD PRESSURE COMPLICATING PREGNANCY IN THIRD TRIMESTER, ANTEPARTUM (HCC): Status: ACTIVE | Noted: 2019-05-20

## 2019-05-20 PROBLEM — O16.3 ELEVATED BLOOD PRESSURE COMPLICATING PREGNANCY IN THIRD TRIMESTER, ANTEPARTUM: Status: ACTIVE | Noted: 2019-05-20

## 2019-05-20 PROCEDURE — 99213 OFFICE O/P EST LOW 20 MIN: CPT

## 2019-05-20 PROCEDURE — 82570 ASSAY OF URINE CREATININE: CPT | Performed by: OBSTETRICS & GYNECOLOGY

## 2019-05-20 PROCEDURE — 85025 COMPLETE CBC W/AUTO DIFF WBC: CPT | Performed by: OBSTETRICS & GYNECOLOGY

## 2019-05-20 PROCEDURE — 80053 COMPREHEN METABOLIC PANEL: CPT | Performed by: OBSTETRICS & GYNECOLOGY

## 2019-05-20 PROCEDURE — 36415 COLL VENOUS BLD VENIPUNCTURE: CPT

## 2019-05-20 PROCEDURE — 84156 ASSAY OF PROTEIN URINE: CPT | Performed by: OBSTETRICS & GYNECOLOGY

## 2019-05-20 NOTE — PROGRESS NOTES
Pt is 28.4 wks pregnant here for B/P check after starting PROCARDIA XL 30 MG on 05/18/2019. Pt states at home B/P are high and ranging from 174/94, 164/95 and 162/100 at office visit today. Pt states she also feels like her heart is pounding.  Pulse rate=97

## 2019-05-20 NOTE — TELEPHONE ENCOUNTER
Patient has not come to L&D for elevated bp.  I called her and she said she is taking her wife home from surgery and then plans on coming in to L&D at [place].

## 2019-05-20 NOTE — TRIAGE
[facility]      Triage Note    Jon Bianchi Patient Status:  Observation    3/23/1980 MRN [id number]   Location [address] [place] [address] Attending Michael Valerio[address] Day # 0 PCP DO Demetrius Tee pressure. PIH assessment WNL. FHR tracing appropriate for gestational age. Labs, BP's and FHR tracing reported to DR Mesa. Discharge order received. Kick count and preeclampsia precaution instructions reviewed with pt. Pt states understanding.  Pt to r 190

## 2019-05-21 ENCOUNTER — TELEPHONE (OUTPATIENT)
Dept: OBGYN CLINIC | Facility: CLINIC | Age: 39
End: 2019-05-21

## 2019-05-21 RX ORDER — NIFEDIPINE 60 MG/1
60 TABLET, EXTENDED RELEASE ORAL DAILY
Qty: 90 TABLET | Refills: 1 | Status: SHIPPED | OUTPATIENT
Start: 2019-05-21 | End: 2019-05-28

## 2019-05-21 NOTE — TELEPHONE ENCOUNTER
Spoke with pt who states BP at 822am this AM was 167/103, 1pm 162/97, 110pm 162/98. Pt denies sx such as headache, blurred vision, visual changes, any other sx. Pt reports +FM.  Pt states she was off Monday and Tuesday and would like to know if she should g

## 2019-05-22 ENCOUNTER — TELEPHONE (OUTPATIENT)
Dept: OBGYN CLINIC | Facility: CLINIC | Age: 39
End: 2019-05-22

## 2019-05-22 NOTE — TELEPHONE ENCOUNTER
Patient requesting an call back In regard to doctors note states will like to discuss with Cosmo Claudio

## 2019-05-22 NOTE — TELEPHONE ENCOUNTER
Pt's BP reading last night was 168/92, this /89 (6AM), two hours later 133/89 (approx 945am). Pt would like to stay off till f/u with EB on 5/28/19, pt has enough PTO but would still need a letter from our office.  Pt works for [employer] at [employer]

## 2019-05-22 NOTE — TELEPHONE ENCOUNTER
Spoke with pt who request letter to read \"pt is under 's care from 5/20/19 until 5/28/19 and will be using intermittent FMLA\". Ok per EB.  Letter to be faxed to [phone] attn: Justo Maldonado    Per pt, pt's PCP submitted a letter for pt for i

## 2019-05-24 ENCOUNTER — TELEPHONE (OUTPATIENT)
Dept: OBGYN CLINIC | Facility: CLINIC | Age: 39
End: 2019-05-24

## 2019-05-24 NOTE — TELEPHONE ENCOUNTER
Pt called back. Pt wanted to review b/p and see if this is \"normal\". Pt states yesterday her b/p in the AM was 151/86, /85 and this morning it was 151/87.   Pt denies headache, vision chagnes, RUQ pain, SOB, new onset of swelling in extremities and

## 2019-05-28 ENCOUNTER — ROUTINE PRENATAL (OUTPATIENT)
Dept: OBGYN CLINIC | Facility: CLINIC | Age: 39
End: 2019-05-28

## 2019-05-28 VITALS
WEIGHT: 259.81 LBS | SYSTOLIC BLOOD PRESSURE: 150 MMHG | HEIGHT: 62 IN | BODY MASS INDEX: 47.81 KG/M2 | DIASTOLIC BLOOD PRESSURE: 86 MMHG

## 2019-05-28 DIAGNOSIS — Z79.84 DIABETES MELLITUS DURING PREGNANCY TREATED WITH ORAL HYPOGLYCEMIC THERAPY: ICD-10-CM

## 2019-05-28 DIAGNOSIS — O24.919 DIABETES MELLITUS DURING PREGNANCY TREATED WITH ORAL HYPOGLYCEMIC THERAPY: ICD-10-CM

## 2019-05-28 DIAGNOSIS — O16.3 HYPERTENSION AFFECTING PREGNANCY IN THIRD TRIMESTER: Primary | ICD-10-CM

## 2019-05-28 RX ORDER — NIFEDIPINE 90 MG/1
90 TABLET, EXTENDED RELEASE ORAL DAILY
Qty: 90 TABLET | Refills: 1 | Status: SHIPPED | OUTPATIENT
Start: 2019-05-28 | End: 2019-06-10

## 2019-05-28 NOTE — PROGRESS NOTES
Feeling active FM. Home BPs in morning still elevated 160s/90/s. No HA, visual changes with normal labs and PC ratio. Reports FBS levels elevated 90s-100s on Metformin, has not yet informed Endocrine.  Discussed possibility of needing insulin - to notify

## 2019-05-29 ENCOUNTER — PATIENT MESSAGE (OUTPATIENT)
Dept: ENDOCRINOLOGY CLINIC | Facility: CLINIC | Age: 39
End: 2019-05-29

## 2019-05-30 NOTE — TELEPHONE ENCOUNTER
Patient states her glucose levels have started to elevate. Patient is pregnant. States her fasting sugars are between  in the mornings and 100-150 after meals during the day. She is tolerating the MTF better now that morning sickness has subsided.  Sh

## 2019-05-30 NOTE — TELEPHONE ENCOUNTER
From: Sheela Werner  To: Karan Subramanian MD  Sent: 5/29/2019 10:38 PM CDT  Subject: Non-Urgent Medical Question    Hello,  This past week my glucose has started to elevate.  It has been between 94- 100 in the mornings (fasting) and between 100-150 after me

## 2019-05-31 ENCOUNTER — OFFICE VISIT (OUTPATIENT)
Dept: ENDOCRINOLOGY CLINIC | Facility: CLINIC | Age: 39
End: 2019-05-31

## 2019-05-31 VITALS
DIASTOLIC BLOOD PRESSURE: 82 MMHG | BODY MASS INDEX: 48 KG/M2 | WEIGHT: 261 LBS | SYSTOLIC BLOOD PRESSURE: 146 MMHG | HEART RATE: 111 BPM

## 2019-05-31 DIAGNOSIS — O24.419 GESTATIONAL DIABETES MELLITUS (GDM), ANTEPARTUM, GESTATIONAL DIABETES METHOD OF CONTROL UNSPECIFIED: Primary | ICD-10-CM

## 2019-05-31 PROCEDURE — 36416 COLLJ CAPILLARY BLOOD SPEC: CPT | Performed by: INTERNAL MEDICINE

## 2019-05-31 PROCEDURE — 82962 GLUCOSE BLOOD TEST: CPT | Performed by: INTERNAL MEDICINE

## 2019-05-31 PROCEDURE — 99213 OFFICE O/P EST LOW 20 MIN: CPT | Performed by: INTERNAL MEDICINE

## 2019-05-31 PROCEDURE — 83036 HEMOGLOBIN GLYCOSYLATED A1C: CPT | Performed by: INTERNAL MEDICINE

## 2019-05-31 RX ORDER — METFORMIN HYDROCHLORIDE 500 MG/1
TABLET, EXTENDED RELEASE ORAL
Qty: 270 TABLET | Refills: 0 | Status: SHIPPED | OUTPATIENT
Start: 2019-05-31 | End: 2020-05-18

## 2019-05-31 NOTE — PROGRESS NOTES
Return Office Visit     CHIEF COMPLAINT:  Patient presents with: Follow - Up: PCOS, 30 weeks pregnant. Patient states sugars have been elevated.  A1C       HISTORY OF PRESENT ILLNESS:  Alon Beyer is a 35year old female who presents for follow up for [facility]  blurring  ENT: Negative for:  dysphagia, neck swelling, dysphonia  Respiratory: Negative for:  dyspnea, cough  Cardiovascular: Negative for:  chest pain, palpitations, orthopnea  GI: Negative for:  abdominal pain, nausea, vomiting, diarrhea, constipation, include increased risk of pre eclampsia, post partum hemorrhage, weight gain.   Future screening:  oral glucose tolerance test 6-12 weeks post partum followed by  lifelong screening for the development of diabetes or prediabetes every 1 to  3 years. If she

## 2019-06-04 ENCOUNTER — ROUTINE PRENATAL (OUTPATIENT)
Dept: OBGYN CLINIC | Facility: CLINIC | Age: 39
End: 2019-06-04

## 2019-06-04 VITALS — DIASTOLIC BLOOD PRESSURE: 80 MMHG | SYSTOLIC BLOOD PRESSURE: 136 MMHG | WEIGHT: 259 LBS | BODY MASS INDEX: 47 KG/M2

## 2019-06-04 DIAGNOSIS — Z34.90 ENCOUNTER FOR SUPERVISION OF NORMAL PREGNANCY, ANTEPARTUM, UNSPECIFIED GRAVIDITY: Primary | ICD-10-CM

## 2019-06-04 NOTE — PROGRESS NOTES
Noel Harden for a checkup. Patient saw Dr. Chase Albert, her hemoglobin A1c was 5.9 and blood sugar was normal.  Her blood pressure is normal today. She reports active fetal movements.   Her prenatal exam today was completely normal.  I recommended that she sees

## 2019-06-10 ENCOUNTER — HOSPITAL ENCOUNTER (OUTPATIENT)
Dept: PERINATAL CARE | Facility: HOSPITAL | Age: 39
Discharge: HOME | End: 2019-06-10
Attending: OBSTETRICS & GYNECOLOGY
Payer: COMMERCIAL

## 2019-06-10 ENCOUNTER — ROUTINE PRENATAL (OUTPATIENT)
Dept: OBGYN CLINIC | Facility: CLINIC | Age: 39
End: 2019-06-10

## 2019-06-10 VITALS
WEIGHT: 259 LBS | HEART RATE: 103 BPM | SYSTOLIC BLOOD PRESSURE: 126 MMHG | BODY MASS INDEX: 47 KG/M2 | DIASTOLIC BLOOD PRESSURE: 82 MMHG

## 2019-06-10 VITALS
WEIGHT: 260.5 LBS | BODY MASS INDEX: 47.94 KG/M2 | DIASTOLIC BLOOD PRESSURE: 82 MMHG | SYSTOLIC BLOOD PRESSURE: 140 MMHG | HEIGHT: 62 IN

## 2019-06-10 DIAGNOSIS — O09.812 PREGNANCY RESULTING FROM ASSISTED REPRODUCTIVE TECHNOLOGY IN SECOND TRIMESTER: ICD-10-CM

## 2019-06-10 DIAGNOSIS — O24.919 DIABETES MELLITUS DURING PREGNANCY TREATED WITH ORAL HYPOGLYCEMIC THERAPY: Primary | ICD-10-CM

## 2019-06-10 DIAGNOSIS — E66.01 OBESITY, CLASS III, BMI 40-49.9 (MORBID OBESITY) (HCC): ICD-10-CM

## 2019-06-10 DIAGNOSIS — O09.523 AMA (ADVANCED MATERNAL AGE) MULTIGRAVIDA 35+, THIRD TRIMESTER: ICD-10-CM

## 2019-06-10 DIAGNOSIS — O16.3 HYPERTENSION AFFECTING PREGNANCY IN THIRD TRIMESTER: ICD-10-CM

## 2019-06-10 DIAGNOSIS — O24.919 DIABETES MELLITUS DURING PREGNANCY TREATED WITH ORAL HYPOGLYCEMIC THERAPY: ICD-10-CM

## 2019-06-10 DIAGNOSIS — O16.3 HYPERTENSION AFFECTING PREGNANCY IN THIRD TRIMESTER: Primary | ICD-10-CM

## 2019-06-10 DIAGNOSIS — Z79.84 DIABETES MELLITUS DURING PREGNANCY TREATED WITH ORAL HYPOGLYCEMIC THERAPY: Primary | ICD-10-CM

## 2019-06-10 DIAGNOSIS — O16.2 HYPERTENSION AFFECTING PREGNANCY IN SECOND TRIMESTER: ICD-10-CM

## 2019-06-10 DIAGNOSIS — Z79.84 DIABETES MELLITUS DURING PREGNANCY TREATED WITH ORAL HYPOGLYCEMIC THERAPY: ICD-10-CM

## 2019-06-10 PROBLEM — K58.9 IBS (IRRITABLE BOWEL SYNDROME): Status: ACTIVE | Noted: 2019-06-10

## 2019-06-10 PROCEDURE — 76816 OB US FOLLOW-UP PER FETUS: CPT | Performed by: OBSTETRICS & GYNECOLOGY

## 2019-06-10 PROCEDURE — 99213 OFFICE O/P EST LOW 20 MIN: CPT | Performed by: OBSTETRICS & GYNECOLOGY

## 2019-06-10 PROCEDURE — 76819 FETAL BIOPHYS PROFIL W/O NST: CPT | Performed by: OBSTETRICS & GYNECOLOGY

## 2019-06-10 PROCEDURE — 90715 TDAP VACCINE 7 YRS/> IM: CPT | Performed by: OBSTETRICS & GYNECOLOGY

## 2019-06-10 PROCEDURE — 90471 IMMUNIZATION ADMIN: CPT | Performed by: OBSTETRICS & GYNECOLOGY

## 2019-06-10 RX ORDER — NIFEDIPINE 60 MG/1
60 TABLET, FILM COATED, EXTENDED RELEASE ORAL EVERY 12 HOURS
Qty: 90 TABLET | Refills: 3 | Status: SHIPPED | OUTPATIENT
Start: 2019-06-10 | End: 2019-06-17

## 2019-06-10 NOTE — PROGRESS NOTES
Byron Boyer  Dear Dr. Kyleigh Delcid,     Thank you for requesting ultrasound evaluation and maternal fetal medicine consultation on your patient Enrique Munroe.  As you are aware she is a 35year Amari Potter a Gordon p (~120 mg daily). Consider Nifedipine XR 60 mg po q 12 hours     Continued medication use and home blood pressure assessments were reviewed as well as recommendations for serial growth ultrasounds and antepartum testing.       IMPRESSION:  · IUP at 31w4d  ·

## 2019-06-10 NOTE — PROGRESS NOTES
She has higher bp's in morning. She does no feel well at that time. Plan to change to nifedipine 60 xL bid. FBS , and 2 hour PP about 115.  Seeing endocrine and on metformin 1 AM and 2 PM and planning on changing to 2 AM and 2 PM. Plans to start NST's

## 2019-06-17 ENCOUNTER — ROUTINE PRENATAL (OUTPATIENT)
Dept: OBGYN CLINIC | Facility: CLINIC | Age: 39
End: 2019-06-17

## 2019-06-17 VITALS
DIASTOLIC BLOOD PRESSURE: 80 MMHG | SYSTOLIC BLOOD PRESSURE: 124 MMHG | HEIGHT: 62 IN | WEIGHT: 262.63 LBS | BODY MASS INDEX: 48.33 KG/M2

## 2019-06-17 DIAGNOSIS — O09.519 SUPERVISION OF HIGH-RISK PREGNANCY OF ELDERLY PRIMIGRAVIDA: Primary | ICD-10-CM

## 2019-06-17 DIAGNOSIS — O16.3 HYPERTENSION AFFECTING PREGNANCY IN THIRD TRIMESTER: ICD-10-CM

## 2019-06-17 DIAGNOSIS — O24.919 DIABETES MELLITUS DURING PREGNANCY TREATED WITH ORAL HYPOGLYCEMIC THERAPY: ICD-10-CM

## 2019-06-17 DIAGNOSIS — Z79.84 DIABETES MELLITUS DURING PREGNANCY TREATED WITH ORAL HYPOGLYCEMIC THERAPY: ICD-10-CM

## 2019-06-17 RX ORDER — NIFEDIPINE 60 MG/1
60 TABLET, EXTENDED RELEASE ORAL 2 TIMES DAILY
COMMUNITY
Start: 2019-06-10 | End: 2021-08-03

## 2019-06-17 NOTE — PROGRESS NOTES
Feeling FM. BP improved on 60 mg Nifedipine twice daily. Blood sugars stable although still has v2srdyc levels elevated that will be discussed with Dr. Heidi Desir at this week's visit. NST today = reactive.

## 2019-06-24 ENCOUNTER — HOSPITAL ENCOUNTER (OUTPATIENT)
Facility: HOSPITAL | Age: 39
Discharge: HOME | End: 2019-06-24
Attending: OBSTETRICS & GYNECOLOGY | Admitting: OBSTETRICS & GYNECOLOGY
Payer: COMMERCIAL

## 2019-06-24 ENCOUNTER — APPOINTMENT (OUTPATIENT)
Dept: OBGYN CLINIC | Facility: HOSPITAL | Age: 39
End: 2019-06-24
Payer: COMMERCIAL

## 2019-06-24 VITALS — DIASTOLIC BLOOD PRESSURE: 86 MMHG | HEART RATE: 102 BPM | SYSTOLIC BLOOD PRESSURE: 153 MMHG

## 2019-06-24 DIAGNOSIS — O09.529 AMA (ADVANCED MATERNAL AGE) MULTIGRAVIDA 35+: ICD-10-CM

## 2019-06-24 PROCEDURE — 59025 FETAL NON-STRESS TEST: CPT

## 2019-06-25 ENCOUNTER — ROUTINE PRENATAL (OUTPATIENT)
Dept: OBGYN CLINIC | Facility: CLINIC | Age: 39
End: 2019-06-25

## 2019-06-25 DIAGNOSIS — O09.519 SUPERVISION OF HIGH-RISK PREGNANCY OF ELDERLY PRIMIGRAVIDA: Primary | ICD-10-CM

## 2019-06-25 DIAGNOSIS — O24.919 DIABETES MELLITUS DURING PREGNANCY TREATED WITH ORAL HYPOGLYCEMIC THERAPY: ICD-10-CM

## 2019-06-25 DIAGNOSIS — O16.3 HYPERTENSION AFFECTING PREGNANCY IN THIRD TRIMESTER: ICD-10-CM

## 2019-06-25 DIAGNOSIS — Z79.84 DIABETES MELLITUS DURING PREGNANCY TREATED WITH ORAL HYPOGLYCEMIC THERAPY: ICD-10-CM

## 2019-06-25 NOTE — NST
Nonstress Test   Patient: Jeronimo Vera    Gestation: 33w4d    NST: AMA, IVF       Variability: Moderate           Accelerations: Yes           Decelerations: None            Baseline: 135 BPM           Uterine Irritability: No           Contractions: Not p

## 2019-06-25 NOTE — PROGRESS NOTES
Active FM. In L&D yesterday with reactive NST. BP stable on same Nifedipine dose. Fasting glucose levels still >100 with most recent adjustment of Metformin to 1000 bid - has appointment with Dr. Kimani Woo 6/28 this week.  To discuss possible insulin if FBS

## 2019-06-28 ENCOUNTER — PATIENT MESSAGE (OUTPATIENT)
Dept: OBGYN CLINIC | Facility: CLINIC | Age: 39
End: 2019-06-28

## 2019-06-28 ENCOUNTER — OFFICE VISIT (OUTPATIENT)
Dept: ENDOCRINOLOGY CLINIC | Facility: CLINIC | Age: 39
End: 2019-06-28

## 2019-06-28 VITALS
BODY MASS INDEX: 48 KG/M2 | DIASTOLIC BLOOD PRESSURE: 83 MMHG | SYSTOLIC BLOOD PRESSURE: 142 MMHG | WEIGHT: 260.63 LBS | HEART RATE: 113 BPM

## 2019-06-28 DIAGNOSIS — O24.419 GESTATIONAL DIABETES MELLITUS (GDM) IN THIRD TRIMESTER, GESTATIONAL DIABETES METHOD OF CONTROL UNSPECIFIED: ICD-10-CM

## 2019-06-28 DIAGNOSIS — O24.419 GESTATIONAL DIABETES MELLITUS (GDM), ANTEPARTUM, GESTATIONAL DIABETES METHOD OF CONTROL UNSPECIFIED: Primary | ICD-10-CM

## 2019-06-28 DIAGNOSIS — O24.419 GESTATIONAL DIABETES MELLITUS (GDM) AFFECTING FIRST PREGNANCY: ICD-10-CM

## 2019-06-28 DIAGNOSIS — E88.81 INSULIN RESISTANCE: ICD-10-CM

## 2019-06-28 DIAGNOSIS — O09.513 AMA (ADVANCED MATERNAL AGE) PRIMIGRAVIDA 35+, THIRD TRIMESTER: Primary | ICD-10-CM

## 2019-06-28 PROCEDURE — 82962 GLUCOSE BLOOD TEST: CPT | Performed by: INTERNAL MEDICINE

## 2019-06-28 PROCEDURE — 99214 OFFICE O/P EST MOD 30 MIN: CPT | Performed by: INTERNAL MEDICINE

## 2019-06-28 PROCEDURE — 83036 HEMOGLOBIN GLYCOSYLATED A1C: CPT | Performed by: INTERNAL MEDICINE

## 2019-06-28 PROCEDURE — 36416 COLLJ CAPILLARY BLOOD SPEC: CPT | Performed by: INTERNAL MEDICINE

## 2019-06-28 NOTE — PROGRESS NOTES
Return Office Visit     CHIEF COMPLAINT:  Patient presents with: Follow - Up: Gestational DM follow up, A1C. 34 weeks pregnant       HISTORY OF PRESENT ILLNESS:  Feli Whalen is a 35year old female who presents for follow up for Gestational Diabetes.   Sh nausea, vomiting, diarrhea, constipation, bleeding  Neurology: Negative for: headache, numbness, weakness  Genito-Urinary: Negative for: dysuria, frequency  Psychiatric: Negative for:  depression, anxiety  Hematology/Lymphatics: Negative for: bruising, low development of diabetes or prediabetes every 1 to  3 years. If she is found to have prediabetes, she will receive lifestyle interventions or metformin to prevent diabetes.     Counselled regarding the importance of weight loss in the post partum period sin

## 2019-07-01 ENCOUNTER — ROUTINE PRENATAL (OUTPATIENT)
Dept: OBGYN CLINIC | Facility: CLINIC | Age: 39
End: 2019-07-01

## 2019-07-01 VITALS — BODY MASS INDEX: 48 KG/M2 | DIASTOLIC BLOOD PRESSURE: 90 MMHG | WEIGHT: 260 LBS | SYSTOLIC BLOOD PRESSURE: 138 MMHG

## 2019-07-01 VITALS
BODY MASS INDEX: 47.29 KG/M2 | WEIGHT: 257 LBS | HEIGHT: 62 IN | SYSTOLIC BLOOD PRESSURE: 120 MMHG | DIASTOLIC BLOOD PRESSURE: 86 MMHG

## 2019-07-01 DIAGNOSIS — Z79.84 DIABETES MELLITUS DURING PREGNANCY TREATED WITH ORAL HYPOGLYCEMIC THERAPY: ICD-10-CM

## 2019-07-01 DIAGNOSIS — O24.919 DIABETES MELLITUS DURING PREGNANCY TREATED WITH ORAL HYPOGLYCEMIC THERAPY: ICD-10-CM

## 2019-07-01 DIAGNOSIS — O09.519 SUPERVISION OF HIGH-RISK PREGNANCY OF ELDERLY PRIMIGRAVIDA: ICD-10-CM

## 2019-07-01 DIAGNOSIS — O16.3 HYPERTENSION AFFECTING PREGNANCY IN THIRD TRIMESTER: Primary | ICD-10-CM

## 2019-07-01 DIAGNOSIS — O09.811 PREGNANCY RESULTING FROM IN VITRO FERTILIZATION IN FIRST TRIMESTER: ICD-10-CM

## 2019-07-01 DIAGNOSIS — E66.01 OBESITY, CLASS III, BMI 40-49.9 (MORBID OBESITY) (HCC): ICD-10-CM

## 2019-07-01 PROCEDURE — 59025 FETAL NON-STRESS TEST: CPT | Performed by: OBSTETRICS & GYNECOLOGY

## 2019-07-01 NOTE — PROGRESS NOTES
Here for prenatal visit and NST. Seen by Dr. Burgess Ochoa last week and FBS levels down to 's after dietary adjustments made and counseling done.  Appetite decreased with increased nausea this week and emesis when drank water - keeping solids and snacks

## 2019-07-05 ENCOUNTER — NURSE ONLY (OUTPATIENT)
Dept: OBGYN CLINIC | Facility: CLINIC | Age: 39
End: 2019-07-05

## 2019-07-05 ENCOUNTER — HOSPITAL ENCOUNTER (OUTPATIENT)
Dept: PERINATAL CARE | Facility: HOSPITAL | Age: 39
Discharge: HOME | End: 2019-07-05
Attending: OBSTETRICS & GYNECOLOGY
Payer: COMMERCIAL

## 2019-07-05 VITALS — SYSTOLIC BLOOD PRESSURE: 128 MMHG | DIASTOLIC BLOOD PRESSURE: 82 MMHG

## 2019-07-05 DIAGNOSIS — O09.811 PREGNANCY RESULTING FROM IN VITRO FERTILIZATION IN FIRST TRIMESTER: ICD-10-CM

## 2019-07-05 DIAGNOSIS — O26.899 PREGNANCY COMPLICATED BY INSULIN RESISTANCE: ICD-10-CM

## 2019-07-05 DIAGNOSIS — O09.519 SUPERVISION OF HIGH-RISK PREGNANCY OF ELDERLY PRIMIGRAVIDA: Primary | ICD-10-CM

## 2019-07-05 DIAGNOSIS — E66.01 OBESITY, CLASS III, BMI 40-49.9 (MORBID OBESITY) (HCC): ICD-10-CM

## 2019-07-05 DIAGNOSIS — O16.3 HYPERTENSION AFFECTING PREGNANCY IN THIRD TRIMESTER: ICD-10-CM

## 2019-07-05 PROCEDURE — 59025 FETAL NON-STRESS TEST: CPT

## 2019-07-05 NOTE — NST
Nonstress Test   Patient: Sean Pandey    Gestation: 35w1d    NST: ama         Variability: Moderate           Accelerations: Yes           Decelerations: None            Baseline: 145 BPM           Uterine Irritability: Yes           Contractions: Not pr

## 2019-07-05 NOTE — ADDENDUM NOTE
Encounter addended by: Tate Deng RN on: 7/5/2019 12:33 PM   Actions taken: Visit diagnoses modified, Order list changed, Diagnosis association updated

## 2019-07-08 ENCOUNTER — HOSPITAL ENCOUNTER (OUTPATIENT)
Dept: PERINATAL CARE | Facility: HOSPITAL | Age: 39
Discharge: HOME | End: 2019-07-08
Attending: OBSTETRICS & GYNECOLOGY
Payer: COMMERCIAL

## 2019-07-08 VITALS — HEART RATE: 98 BPM | DIASTOLIC BLOOD PRESSURE: 81 MMHG | SYSTOLIC BLOOD PRESSURE: 140 MMHG

## 2019-07-08 DIAGNOSIS — O24.419 GDM (GESTATIONAL DIABETES MELLITUS): ICD-10-CM

## 2019-07-08 DIAGNOSIS — E66.9 OBESITY: ICD-10-CM

## 2019-07-08 DIAGNOSIS — O09.529 AMA (ADVANCED MATERNAL AGE) MULTIGRAVIDA 35+: Primary | ICD-10-CM

## 2019-07-08 PROCEDURE — 59025 FETAL NON-STRESS TEST: CPT | Performed by: OBSTETRICS & GYNECOLOGY

## 2019-07-08 NOTE — NST
Nonstress Test   Patient: Ricky Wells    Gestation: 35w4d    NST:       Variability: Moderate           Accelerations: Yes           Decelerations: None            Baseline: 140 BPM           Uterine Irritability: No           Contractions: Irregular

## 2019-07-08 NOTE — ADDENDUM NOTE
Encounter addended by: Jessica Saldana RN on: 7/8/2019 2:35 PM   Actions taken: Visit Navigator Flowsheet section accepted

## 2019-07-10 ENCOUNTER — HOSPITAL ENCOUNTER (OUTPATIENT)
Dept: PERINATAL CARE | Facility: HOSPITAL | Age: 39
Discharge: HOME | End: 2019-07-10
Attending: OBSTETRICS & GYNECOLOGY
Payer: COMMERCIAL

## 2019-07-10 DIAGNOSIS — O24.919 DIABETES MELLITUS DURING PREGNANCY TREATED WITH ORAL HYPOGLYCEMIC THERAPY: ICD-10-CM

## 2019-07-10 DIAGNOSIS — Z79.84 DIABETES MELLITUS DURING PREGNANCY TREATED WITH ORAL HYPOGLYCEMIC THERAPY: ICD-10-CM

## 2019-07-10 DIAGNOSIS — O09.811 PREGNANCY RESULTING FROM IN VITRO FERTILIZATION IN FIRST TRIMESTER: ICD-10-CM

## 2019-07-10 DIAGNOSIS — O09.519 SUPERVISION OF HIGH-RISK PREGNANCY OF ELDERLY PRIMIGRAVIDA: ICD-10-CM

## 2019-07-10 DIAGNOSIS — E66.01 OBESITY, CLASS III, BMI 40-49.9 (MORBID OBESITY) (HCC): ICD-10-CM

## 2019-07-10 DIAGNOSIS — Z79.84 DIABETES MELLITUS DURING PREGNANCY TREATED WITH ORAL HYPOGLYCEMIC THERAPY: Primary | ICD-10-CM

## 2019-07-10 DIAGNOSIS — O16.3 HYPERTENSION AFFECTING PREGNANCY IN THIRD TRIMESTER: ICD-10-CM

## 2019-07-10 DIAGNOSIS — O24.919 DIABETES MELLITUS DURING PREGNANCY TREATED WITH ORAL HYPOGLYCEMIC THERAPY: Primary | ICD-10-CM

## 2019-07-10 PROCEDURE — 76816 OB US FOLLOW-UP PER FETUS: CPT | Performed by: OBSTETRICS & GYNECOLOGY

## 2019-07-10 PROCEDURE — 99213 OFFICE O/P EST LOW 20 MIN: CPT | Performed by: OBSTETRICS & GYNECOLOGY

## 2019-07-10 PROCEDURE — 76819 FETAL BIOPHYS PROFIL W/O NST: CPT | Performed by: OBSTETRICS & GYNECOLOGY

## 2019-07-10 NOTE — PROGRESS NOTES
[facility]  Dear Dr. Ranya Sams,     Thank you for requesting ultrasound evaluation and maternal fetal medicine consultation on your patient Calleen Dancer.  As you are aware she is a 35year Brice Hof a Gordon p Vertex  Amniotic fluid MVP: 12.9 cm  Cord: 3 vessel cord  Placental Location: Anterior    EFW:  3427g  (  7  lbs 9  oz )   87.6 %    BPP 8/8    Summary of Ultrasound findings:   This is a [place] pregnancy    The fetal measurements are consistent with est

## 2019-07-11 ENCOUNTER — TELEPHONE (OUTPATIENT)
Dept: OBGYN CLINIC | Facility: CLINIC | Age: 39
End: 2019-07-11

## 2019-07-11 ENCOUNTER — ROUTINE PRENATAL (OUTPATIENT)
Dept: OBGYN CLINIC | Facility: CLINIC | Age: 39
End: 2019-07-11

## 2019-07-11 ENCOUNTER — LAB ENCOUNTER (OUTPATIENT)
Dept: LAB | Facility: REFERENCE LAB | Age: 39
End: 2019-07-11
Attending: OBSTETRICS & GYNECOLOGY
Payer: COMMERCIAL

## 2019-07-11 VITALS
BODY MASS INDEX: 48.21 KG/M2 | DIASTOLIC BLOOD PRESSURE: 90 MMHG | SYSTOLIC BLOOD PRESSURE: 140 MMHG | WEIGHT: 262 LBS | HEIGHT: 62 IN

## 2019-07-11 DIAGNOSIS — O09.519 SUPERVISION OF HIGH-RISK PREGNANCY OF ELDERLY PRIMIGRAVIDA: ICD-10-CM

## 2019-07-11 DIAGNOSIS — O09.519 SUPERVISION OF HIGH-RISK PREGNANCY OF ELDERLY PRIMIGRAVIDA: Primary | ICD-10-CM

## 2019-07-11 LAB
DEPRECATED RDW RBC AUTO: 41 FL (ref 35.1–46.3)
ERYTHROCYTE [DISTWIDTH] IN BLOOD BY AUTOMATED COUNT: 13 % (ref 11–15)
HCT VFR BLD AUTO: 33.8 % (ref 35–48)
HGB BLD-MCNC: 10.7 G/DL (ref 12–16)
MCH RBC QN AUTO: 27.6 PG (ref 26–34)
MCHC RBC AUTO-ENTMCNC: 31.7 G/DL (ref 31–37)
MCV RBC AUTO: 87.1 FL (ref 80–100)
PLATELET # BLD AUTO: 253 10(3)UL (ref 150–450)
RBC # BLD AUTO: 3.88 X10(6)UL (ref 3.8–5.3)
WBC # BLD AUTO: 10.1 X10(3) UL (ref 4–11)

## 2019-07-11 PROCEDURE — 87389 HIV-1 AG W/HIV-1&-2 AB AG IA: CPT

## 2019-07-11 PROCEDURE — 86780 TREPONEMA PALLIDUM: CPT

## 2019-07-11 PROCEDURE — 85027 COMPLETE CBC AUTOMATED: CPT

## 2019-07-11 PROCEDURE — 87653 STREP B DNA AMP PROBE: CPT | Performed by: OBSTETRICS & GYNECOLOGY

## 2019-07-11 PROCEDURE — 36415 COLL VENOUS BLD VENIPUNCTURE: CPT

## 2019-07-11 PROCEDURE — 87081 CULTURE SCREEN ONLY: CPT | Performed by: OBSTETRICS & GYNECOLOGY

## 2019-07-11 NOTE — ADDENDUM NOTE
Encounter addended by: Jose Antonio Smith MD on: 7/10/2019 10:35 PM   Actions taken: Sign clinical note, Charge Capture section accepted

## 2019-07-11 NOTE — TELEPHONE ENCOUNTER
Pt to be induced on 7/18/19 per KATHI. Anna Zapata to call pt with instructions.  Pt verbalized understanding and agrees

## 2019-07-11 NOTE — PROGRESS NOTES
Here for NST which is reactive. . No HA, visual changes RUQ pain. FBS usually less than 90 and 2 hour PP are normal. 6/28/2019 hgba1c = 5.7 Patient would prefer to be delivered at 40 W. Will check with Dr. Mily Ochoa if this is appropriate.  NANI pt SSX of isabelle

## 2019-07-12 PROBLEM — O99.013 ANEMIA DURING PREGNANCY IN THIRD TRIMESTER: Status: ACTIVE | Noted: 2019-07-12

## 2019-07-12 PROBLEM — O99.013 ANEMIA DURING PREGNANCY IN THIRD TRIMESTER (HCC): Status: ACTIVE | Noted: 2019-07-12

## 2019-07-12 LAB — T PALLIDUM AB SER QL: NEGATIVE

## 2019-07-12 NOTE — TELEPHONE ENCOUNTER
Spoke with patient to inform her that her induction is scheduled for 6:00 on 7/18/19. Patient verbalized understanding.

## 2019-07-16 ENCOUNTER — ROUTINE PRENATAL (OUTPATIENT)
Dept: OBGYN CLINIC | Facility: CLINIC | Age: 39
End: 2019-07-16

## 2019-07-16 VITALS
WEIGHT: 262.13 LBS | DIASTOLIC BLOOD PRESSURE: 88 MMHG | BODY MASS INDEX: 48.24 KG/M2 | HEIGHT: 62 IN | SYSTOLIC BLOOD PRESSURE: 132 MMHG

## 2019-07-16 DIAGNOSIS — O09.519 SUPERVISION OF HIGH-RISK PREGNANCY OF ELDERLY PRIMIGRAVIDA: Primary | ICD-10-CM

## 2019-07-17 ENCOUNTER — TELEPHONE (OUTPATIENT)
Dept: OBGYN UNIT | Facility: HOSPITAL | Age: 39
End: 2019-07-17

## 2019-07-18 ENCOUNTER — HOSPITAL ENCOUNTER (INPATIENT)
Facility: HOSPITAL | Age: 39
LOS: 5 days | Discharge: HOME | End: 2019-07-23
Attending: OBSTETRICS & GYNECOLOGY | Admitting: OBSTETRICS & GYNECOLOGY
Payer: COMMERCIAL

## 2019-07-18 ENCOUNTER — APPOINTMENT (OUTPATIENT)
Dept: OBGYN CLINIC | Facility: HOSPITAL | Age: 39
End: 2019-07-18
Attending: OBSTETRICS & GYNECOLOGY
Payer: COMMERCIAL

## 2019-07-18 PROBLEM — Z34.90 ENCOUNTER FOR PLANNED INDUCTION OF LABOR (HCC): Status: ACTIVE | Noted: 2019-07-18

## 2019-07-18 PROBLEM — Z34.90 ENCOUNTER FOR PLANNED INDUCTION OF LABOR: Status: ACTIVE | Noted: 2019-07-18

## 2019-07-18 LAB
ANTIBODY SCREEN: NEGATIVE
DEPRECATED RDW RBC AUTO: 41.1 FL (ref 35.1–46.3)
ERYTHROCYTE [DISTWIDTH] IN BLOOD BY AUTOMATED COUNT: 13.4 % (ref 11–15)
GLUCOSE BLDC GLUCOMTR-MCNC: 109 MG/DL (ref 70–99)
GLUCOSE BLDC GLUCOMTR-MCNC: 123 MG/DL (ref 70–99)
HCT VFR BLD AUTO: 33.3 % (ref 35–48)
HGB BLD-MCNC: 10.8 G/DL (ref 12–16)
MCH RBC QN AUTO: 28 PG (ref 26–34)
MCHC RBC AUTO-ENTMCNC: 32.4 G/DL (ref 31–37)
MCV RBC AUTO: 86.3 FL (ref 80–100)
PLATELET # BLD AUTO: 252 10(3)UL (ref 150–450)
RBC # BLD AUTO: 3.86 X10(6)UL (ref 3.8–5.3)
RH BLOOD TYPE: POSITIVE
WBC # BLD AUTO: 10.9 X10(3) UL (ref 4–11)

## 2019-07-18 RX ORDER — IBUPROFEN 600 MG/1
600 TABLET ORAL ONCE AS NEEDED
Status: ACTIVE | OUTPATIENT
Start: 2019-07-18 | End: 2019-07-20

## 2019-07-18 RX ORDER — CALCIUM GLUCONATE 94 MG/ML
1 INJECTION, SOLUTION INTRAVENOUS ONCE
Status: DISCONTINUED | OUTPATIENT
Start: 2019-07-18 | End: 2019-07-21 | Stop reason: HOSPADM

## 2019-07-18 RX ORDER — AMMONIA INHALANTS 0.04 G/.3ML
0.3 INHALANT RESPIRATORY (INHALATION) AS NEEDED
Status: DISCONTINUED | OUTPATIENT
Start: 2019-07-18 | End: 2019-07-21 | Stop reason: ALTCHOICE

## 2019-07-18 RX ORDER — NIFEDIPINE 60 MG/1
60 TABLET, EXTENDED RELEASE ORAL 2 TIMES DAILY
Status: DISCONTINUED | OUTPATIENT
Start: 2019-07-18 | End: 2019-07-23

## 2019-07-18 RX ORDER — TERBUTALINE SULFATE 1 MG/ML
0.25 INJECTION, SOLUTION SUBCUTANEOUS AS NEEDED
Status: DISCONTINUED | OUTPATIENT
Start: 2019-07-18 | End: 2019-07-21 | Stop reason: ALTCHOICE

## 2019-07-18 RX ORDER — TRISODIUM CITRATE DIHYDRATE AND CITRIC ACID MONOHYDRATE 500; 334 MG/5ML; MG/5ML
30 SOLUTION ORAL AS NEEDED
Status: DISCONTINUED | OUTPATIENT
Start: 2019-07-18 | End: 2019-07-21 | Stop reason: ALTCHOICE

## 2019-07-18 RX ORDER — PRENATAL VIT/IRON FUM/FOLIC AC 27MG-0.8MG
1 TABLET ORAL DAILY
COMMUNITY

## 2019-07-18 RX ORDER — SODIUM CHLORIDE, SODIUM LACTATE, POTASSIUM CHLORIDE, CALCIUM CHLORIDE 600; 310; 30; 20 MG/100ML; MG/100ML; MG/100ML; MG/100ML
INJECTION, SOLUTION INTRAVENOUS CONTINUOUS
Status: DISCONTINUED | OUTPATIENT
Start: 2019-07-18 | End: 2019-07-21 | Stop reason: ALTCHOICE

## 2019-07-18 RX ORDER — LABETALOL HYDROCHLORIDE 5 MG/ML
INJECTION, SOLUTION INTRAVENOUS
Status: DISPENSED
Start: 2019-07-18 | End: 2019-07-19

## 2019-07-18 RX ORDER — HYDRALAZINE HYDROCHLORIDE 20 MG/ML
INJECTION INTRAMUSCULAR; INTRAVENOUS
Status: DISCONTINUED | OUTPATIENT
Start: 2019-07-18 | End: 2019-07-21 | Stop reason: HOSPADM

## 2019-07-18 RX ORDER — MELATONIN
325
COMMUNITY
End: 2021-08-03

## 2019-07-18 RX ORDER — LABETALOL HYDROCHLORIDE 5 MG/ML
20 INJECTION, SOLUTION INTRAVENOUS
Status: DISCONTINUED | OUTPATIENT
Start: 2019-07-18 | End: 2019-07-21 | Stop reason: HOSPADM

## 2019-07-18 RX ORDER — LIDOCAINE HYDROCHLORIDE 10 MG/ML
30 INJECTION, SOLUTION EPIDURAL; INFILTRATION; INTRACAUDAL; PERINEURAL ONCE
Status: DISCONTINUED | OUTPATIENT
Start: 2019-07-18 | End: 2019-07-21 | Stop reason: ALTCHOICE

## 2019-07-18 RX ORDER — NALBUPHINE HCL 10 MG/ML
10 AMPUL (ML) INJECTION
Status: DISCONTINUED | OUTPATIENT
Start: 2019-07-18 | End: 2019-07-21 | Stop reason: ALTCHOICE

## 2019-07-18 RX ORDER — SODIUM CHLORIDE 0.9 % (FLUSH) 0.9 %
10 SYRINGE (ML) INJECTION AS NEEDED
Status: DISCONTINUED | OUTPATIENT
Start: 2019-07-18 | End: 2019-07-21 | Stop reason: ALTCHOICE

## 2019-07-18 NOTE — H&P
GYN H&P     2019  6:20 PM    CC: Patient is here for IOL for AMA,  hypertension, diabetes at 40 W    HPI: Patient is a 35year old  for IOL for AMA, CHTN and likely pregestational DM.     Patients pregnancy c/b:  1.) IVF preg  2.) AMA  3.) likely p children: Not on file      Years of education: Not on file      Highest education level: Not on file    Occupational History      Occupation: health     Tobacco Use      Smoking status: Never Smoker      Smokeless tobacco: Never Used    Substance

## 2019-07-19 LAB
CREAT UR-SCNC: 218 MG/DL
GLUCOSE BLDC GLUCOMTR-MCNC: 108 MG/DL (ref 70–99)
GLUCOSE BLDC GLUCOMTR-MCNC: 118 MG/DL (ref 70–99)
GLUCOSE BLDC GLUCOMTR-MCNC: 93 MG/DL (ref 70–99)
PROT UR-MCNC: 47 MG/DL
PROT/CREAT UR-RTO: 0.22

## 2019-07-19 PROCEDURE — 3E0P7VZ INTRODUCTION OF HORMONE INTO FEMALE REPRODUCTIVE, VIA NATURAL OR ARTIFICIAL OPENING: ICD-10-PCS | Performed by: OBSTETRICS & GYNECOLOGY

## 2019-07-19 PROCEDURE — 59200 INSERT CERVICAL DILATOR: CPT | Performed by: OBSTETRICS & GYNECOLOGY

## 2019-07-19 RX ORDER — ONDANSETRON 2 MG/ML
4 INJECTION INTRAMUSCULAR; INTRAVENOUS EVERY 6 HOURS PRN
Status: DISCONTINUED | OUTPATIENT
Start: 2019-07-19 | End: 2019-07-21 | Stop reason: ALTCHOICE

## 2019-07-19 NOTE — PROGRESS NOTES
[facility]    OB/GYNE Progress Note      Nitish Franco Patient Status:  Inpatient    3/23/1980 MRN [id number]   [facility] [address] [facility] PCP Fab Forrest DO

## 2019-07-19 NOTE — PROGRESS NOTES
Pt is a 35year old female admitted to 371/371-A. Patient presents with:  Scheduled Induction     Pt is  37w0d intra-uterine pregnancy. History obtained, consents signed. Oriented to room, staff, and plan of care.

## 2019-07-20 LAB — GLUCOSE BLDC GLUCOMTR-MCNC: 84 MG/DL (ref 70–99)

## 2019-07-20 NOTE — PROGRESS NOTES
Patient starting to feel contractions, more uncomfortable after ambulating    AVSS  FHTS Cat 1 140s  UCs every 3-5  SVE posterior, FT/70/-3    A/P:  37.2w CHTN, GDM  - Continue IOL  - Epidural okay when requests      Kelly Solomon MD  7/20/2019

## 2019-07-21 ENCOUNTER — ANESTHESIA EVENT (OUTPATIENT)
Dept: OBGYN UNIT | Facility: HOSPITAL | Age: 39
End: 2019-07-21
Payer: COMMERCIAL

## 2019-07-21 ENCOUNTER — ANESTHESIA (OUTPATIENT)
Dept: OBGYN UNIT | Facility: HOSPITAL | Age: 39
End: 2019-07-21
Payer: COMMERCIAL

## 2019-07-21 LAB
GLUCOSE BLDC GLUCOMTR-MCNC: 135 MG/DL (ref 70–99)
GLUCOSE BLDC GLUCOMTR-MCNC: 77 MG/DL (ref 70–99)

## 2019-07-21 PROCEDURE — 59025 FETAL NON-STRESS TEST: CPT | Performed by: OBSTETRICS & GYNECOLOGY

## 2019-07-21 PROCEDURE — 59514 CESAREAN DELIVERY ONLY: CPT | Performed by: OBSTETRICS & GYNECOLOGY

## 2019-07-21 PROCEDURE — 59510 CESAREAN DELIVERY: CPT | Performed by: OBSTETRICS & GYNECOLOGY

## 2019-07-21 RX ORDER — FAMOTIDINE 10 MG/ML
INJECTION, SOLUTION INTRAVENOUS
Status: COMPLETED
Start: 2019-07-21 | End: 2019-07-21

## 2019-07-21 RX ORDER — HYDROCODONE BITARTRATE AND ACETAMINOPHEN 5; 325 MG/1; MG/1
1 TABLET ORAL AS NEEDED
Status: DISCONTINUED | OUTPATIENT
Start: 2019-07-21 | End: 2019-07-21 | Stop reason: ALTCHOICE

## 2019-07-21 RX ORDER — HYDROMORPHONE HYDROCHLORIDE 1 MG/ML
0.4 INJECTION, SOLUTION INTRAMUSCULAR; INTRAVENOUS; SUBCUTANEOUS EVERY 5 MIN PRN
Status: DISCONTINUED | OUTPATIENT
Start: 2019-07-21 | End: 2019-07-21 | Stop reason: ALTCHOICE

## 2019-07-21 RX ORDER — HALOPERIDOL 5 MG/ML
0.25 INJECTION INTRAMUSCULAR ONCE AS NEEDED
Status: DISCONTINUED | OUTPATIENT
Start: 2019-07-21 | End: 2019-07-21 | Stop reason: ALTCHOICE

## 2019-07-21 RX ORDER — FAMOTIDINE 10 MG/ML
20 INJECTION, SOLUTION INTRAVENOUS ONCE
Status: COMPLETED | OUTPATIENT
Start: 2019-07-21 | End: 2019-07-21

## 2019-07-21 RX ORDER — NALOXONE HYDROCHLORIDE 0.4 MG/ML
80 INJECTION, SOLUTION INTRAMUSCULAR; INTRAVENOUS; SUBCUTANEOUS AS NEEDED
Status: DISCONTINUED | OUTPATIENT
Start: 2019-07-21 | End: 2019-07-21 | Stop reason: ALTCHOICE

## 2019-07-21 RX ORDER — HYDROCODONE BITARTRATE AND ACETAMINOPHEN 5; 325 MG/1; MG/1
2 TABLET ORAL EVERY 6 HOURS PRN
Status: DISCONTINUED | OUTPATIENT
Start: 2019-07-21 | End: 2019-07-23

## 2019-07-21 RX ORDER — MORPHINE SULFATE 10 MG/ML
6 INJECTION, SOLUTION INTRAMUSCULAR; INTRAVENOUS EVERY 10 MIN PRN
Status: DISCONTINUED | OUTPATIENT
Start: 2019-07-21 | End: 2019-07-21 | Stop reason: ALTCHOICE

## 2019-07-21 RX ORDER — ONDANSETRON 2 MG/ML
4 INJECTION INTRAMUSCULAR; INTRAVENOUS ONCE AS NEEDED
Status: ACTIVE | OUTPATIENT
Start: 2019-07-21 | End: 2019-07-21

## 2019-07-21 RX ORDER — DEXTROSE, SODIUM CHLORIDE, SODIUM LACTATE, POTASSIUM CHLORIDE, AND CALCIUM CHLORIDE 5; .6; .31; .03; .02 G/100ML; G/100ML; G/100ML; G/100ML; G/100ML
INJECTION, SOLUTION INTRAVENOUS CONTINUOUS
Status: DISCONTINUED | OUTPATIENT
Start: 2019-07-21 | End: 2019-07-23

## 2019-07-21 RX ORDER — ONDANSETRON 2 MG/ML
4 INJECTION INTRAMUSCULAR; INTRAVENOUS ONCE AS NEEDED
Status: DISCONTINUED | OUTPATIENT
Start: 2019-07-21 | End: 2019-07-21 | Stop reason: ALTCHOICE

## 2019-07-21 RX ORDER — LIDOCAINE HYDROCHLORIDE 10 MG/ML
INJECTION, SOLUTION INFILTRATION; PERINEURAL
Status: COMPLETED | OUTPATIENT
Start: 2019-07-21 | End: 2019-07-21

## 2019-07-21 RX ORDER — MORPHINE SULFATE 4 MG/ML
2 INJECTION, SOLUTION INTRAMUSCULAR; INTRAVENOUS EVERY 10 MIN PRN
Status: DISCONTINUED | OUTPATIENT
Start: 2019-07-21 | End: 2019-07-21 | Stop reason: ALTCHOICE

## 2019-07-21 RX ORDER — METOCLOPRAMIDE HYDROCHLORIDE 5 MG/ML
10 INJECTION INTRAMUSCULAR; INTRAVENOUS ONCE
Status: COMPLETED | OUTPATIENT
Start: 2019-07-21 | End: 2019-07-21

## 2019-07-21 RX ORDER — POLYETHYLENE GLYCOL 3350 17 G/17G
17 POWDER, FOR SOLUTION ORAL DAILY PRN
Status: DISCONTINUED | OUTPATIENT
Start: 2019-07-21 | End: 2019-07-23

## 2019-07-21 RX ORDER — ACETAMINOPHEN 325 MG/1
650 TABLET ORAL EVERY 6 HOURS PRN
Status: DISCONTINUED | OUTPATIENT
Start: 2019-07-21 | End: 2019-07-23

## 2019-07-21 RX ORDER — MISOPROSTOL 100 UG/1
TABLET ORAL
Status: DISCONTINUED
Start: 2019-07-21 | End: 2019-07-21 | Stop reason: WASHOUT

## 2019-07-21 RX ORDER — BUPIVACAINE HYDROCHLORIDE 5 MG/ML
INJECTION, SOLUTION EPIDURAL; INTRACAUDAL
Status: COMPLETED | OUTPATIENT
Start: 2019-07-21 | End: 2019-07-21

## 2019-07-21 RX ORDER — CARBOPROST TROMETHAMINE 250 UG/ML
INJECTION, SOLUTION INTRAMUSCULAR
Status: DISCONTINUED
Start: 2019-07-21 | End: 2019-07-21 | Stop reason: WASHOUT

## 2019-07-21 RX ORDER — BISACODYL 10 MG
10 SUPPOSITORY, RECTAL RECTAL
Status: DISCONTINUED | OUTPATIENT
Start: 2019-07-21 | End: 2019-07-23

## 2019-07-21 RX ORDER — METHYLERGONOVINE MALEATE 0.2 MG/ML
INJECTION INTRAVENOUS
Status: DISCONTINUED
Start: 2019-07-21 | End: 2019-07-21 | Stop reason: WASHOUT

## 2019-07-21 RX ORDER — HEPARIN SODIUM 5000 [USP'U]/ML
5000 INJECTION, SOLUTION INTRAVENOUS; SUBCUTANEOUS EVERY 12 HOURS SCHEDULED
Status: DISCONTINUED | OUTPATIENT
Start: 2019-07-21 | End: 2019-07-23

## 2019-07-21 RX ORDER — PROCHLORPERAZINE EDISYLATE 5 MG/ML
5 INJECTION INTRAMUSCULAR; INTRAVENOUS ONCE AS NEEDED
Status: DISCONTINUED | OUTPATIENT
Start: 2019-07-21 | End: 2019-07-21 | Stop reason: ALTCHOICE

## 2019-07-21 RX ORDER — SODIUM CHLORIDE 9 MG/ML
INJECTION, SOLUTION INTRAVENOUS CONTINUOUS PRN
Status: DISCONTINUED | OUTPATIENT
Start: 2019-07-21 | End: 2019-07-21 | Stop reason: SURG

## 2019-07-21 RX ORDER — SODIUM CHLORIDE, SODIUM LACTATE, POTASSIUM CHLORIDE, CALCIUM CHLORIDE 600; 310; 30; 20 MG/100ML; MG/100ML; MG/100ML; MG/100ML
INJECTION, SOLUTION INTRAVENOUS CONTINUOUS
Status: DISCONTINUED | OUTPATIENT
Start: 2019-07-21 | End: 2019-07-21 | Stop reason: ALTCHOICE

## 2019-07-21 RX ORDER — CEFAZOLIN SODIUM/WATER 2 G/20 ML
SYRINGE (ML) INTRAVENOUS
Status: DISPENSED
Start: 2019-07-21 | End: 2019-07-21

## 2019-07-21 RX ORDER — HYDROCODONE BITARTRATE AND ACETAMINOPHEN 5; 325 MG/1; MG/1
2 TABLET ORAL AS NEEDED
Status: DISCONTINUED | OUTPATIENT
Start: 2019-07-21 | End: 2019-07-21 | Stop reason: ALTCHOICE

## 2019-07-21 RX ORDER — HYDROMORPHONE HYDROCHLORIDE 1 MG/ML
0.4 INJECTION, SOLUTION INTRAMUSCULAR; INTRAVENOUS; SUBCUTANEOUS
Status: ACTIVE | OUTPATIENT
Start: 2019-07-21 | End: 2019-07-22

## 2019-07-21 RX ORDER — KETOROLAC TROMETHAMINE 30 MG/ML
INJECTION, SOLUTION INTRAMUSCULAR; INTRAVENOUS
Status: COMPLETED
Start: 2019-07-21 | End: 2019-07-21

## 2019-07-21 RX ORDER — MORPHINE SULFATE 4 MG/ML
4 INJECTION, SOLUTION INTRAMUSCULAR; INTRAVENOUS EVERY 10 MIN PRN
Status: DISCONTINUED | OUTPATIENT
Start: 2019-07-21 | End: 2019-07-21 | Stop reason: ALTCHOICE

## 2019-07-21 RX ORDER — ACETAMINOPHEN 325 MG/1
650 TABLET ORAL EVERY 6 HOURS PRN
Status: ACTIVE | OUTPATIENT
Start: 2019-07-21 | End: 2019-07-22

## 2019-07-21 RX ORDER — METOCLOPRAMIDE HYDROCHLORIDE 5 MG/ML
INJECTION INTRAMUSCULAR; INTRAVENOUS
Status: COMPLETED
Start: 2019-07-21 | End: 2019-07-21

## 2019-07-21 RX ORDER — SODIUM PHOSPHATE, DIBASIC AND SODIUM PHOSPHATE, MONOBASIC 7; 19 G/133ML; G/133ML
1 ENEMA RECTAL ONCE AS NEEDED
Status: DISCONTINUED | OUTPATIENT
Start: 2019-07-21 | End: 2019-07-23

## 2019-07-21 RX ORDER — HALOPERIDOL 5 MG/ML
0.5 INJECTION INTRAMUSCULAR ONCE AS NEEDED
Status: ACTIVE | OUTPATIENT
Start: 2019-07-21 | End: 2019-07-21

## 2019-07-21 RX ORDER — EPHEDRINE SULFATE 50 MG/ML
INJECTION, SOLUTION INTRAVENOUS AS NEEDED
Status: DISCONTINUED | OUTPATIENT
Start: 2019-07-21 | End: 2019-07-21 | Stop reason: SURG

## 2019-07-21 RX ORDER — NALBUPHINE HCL 10 MG/ML
2.5 AMPUL (ML) INJECTION EVERY 4 HOURS PRN
Status: DISCONTINUED | OUTPATIENT
Start: 2019-07-21 | End: 2019-07-23

## 2019-07-21 RX ORDER — ONDANSETRON 2 MG/ML
4 INJECTION INTRAMUSCULAR; INTRAVENOUS EVERY 6 HOURS PRN
Status: DISCONTINUED | OUTPATIENT
Start: 2019-07-21 | End: 2019-07-23

## 2019-07-21 RX ORDER — HYDROCODONE BITARTRATE AND ACETAMINOPHEN 7.5; 325 MG/1; MG/1
1 TABLET ORAL EVERY 6 HOURS PRN
Status: ACTIVE | OUTPATIENT
Start: 2019-07-21 | End: 2019-07-22

## 2019-07-21 RX ORDER — SIMETHICONE 80 MG
80 TABLET,CHEWABLE ORAL 3 TIMES DAILY PRN
Status: DISCONTINUED | OUTPATIENT
Start: 2019-07-21 | End: 2019-07-23

## 2019-07-21 RX ORDER — DEXTROSE MONOHYDRATE 25 G/50ML
50 INJECTION, SOLUTION INTRAVENOUS
Status: DISCONTINUED | OUTPATIENT
Start: 2019-07-21 | End: 2019-07-21 | Stop reason: ALTCHOICE

## 2019-07-21 RX ORDER — BUPIVACAINE HYDROCHLORIDE 7.5 MG/ML
INJECTION, SOLUTION INTRASPINAL AS NEEDED
Status: DISCONTINUED | OUTPATIENT
Start: 2019-07-21 | End: 2019-07-21 | Stop reason: SURG

## 2019-07-21 RX ORDER — MIDAZOLAM HYDROCHLORIDE 1 MG/ML
INJECTION INTRAMUSCULAR; INTRAVENOUS AS NEEDED
Status: DISCONTINUED | OUTPATIENT
Start: 2019-07-21 | End: 2019-07-21 | Stop reason: SURG

## 2019-07-21 RX ORDER — HYDROCODONE BITARTRATE AND ACETAMINOPHEN 7.5; 325 MG/1; MG/1
2 TABLET ORAL EVERY 6 HOURS PRN
Status: ACTIVE | OUTPATIENT
Start: 2019-07-21 | End: 2019-07-22

## 2019-07-21 RX ORDER — PROCHLORPERAZINE EDISYLATE 5 MG/ML
5 INJECTION INTRAMUSCULAR; INTRAVENOUS ONCE AS NEEDED
Status: ACTIVE | OUTPATIENT
Start: 2019-07-21 | End: 2019-07-21

## 2019-07-21 RX ORDER — MORPHINE SULFATE 1 MG/ML
INJECTION, SOLUTION EPIDURAL; INTRATHECAL; INTRAVENOUS AS NEEDED
Status: DISCONTINUED | OUTPATIENT
Start: 2019-07-21 | End: 2019-07-21 | Stop reason: SURG

## 2019-07-21 RX ORDER — CHOLECALCIFEROL (VITAMIN D3) 25 MCG
1 TABLET,CHEWABLE ORAL DAILY
Status: DISCONTINUED | OUTPATIENT
Start: 2019-07-21 | End: 2019-07-23

## 2019-07-21 RX ORDER — TRISODIUM CITRATE DIHYDRATE AND CITRIC ACID MONOHYDRATE 500; 334 MG/5ML; MG/5ML
30 SOLUTION ORAL ONCE
Status: DISCONTINUED | OUTPATIENT
Start: 2019-07-21 | End: 2019-07-21 | Stop reason: HOSPADM

## 2019-07-21 RX ORDER — DIPHENHYDRAMINE HCL 25 MG
25 CAPSULE ORAL EVERY 4 HOURS PRN
Status: ACTIVE | OUTPATIENT
Start: 2019-07-21 | End: 2019-07-22

## 2019-07-21 RX ORDER — HYDROCODONE BITARTRATE AND ACETAMINOPHEN 5; 325 MG/1; MG/1
1 TABLET ORAL EVERY 6 HOURS PRN
Status: DISCONTINUED | OUTPATIENT
Start: 2019-07-21 | End: 2019-07-23

## 2019-07-21 RX ORDER — MORPHINE SULFATE 1 MG/ML
INJECTION, SOLUTION EPIDURAL; INTRATHECAL; INTRAVENOUS
Status: COMPLETED | OUTPATIENT
Start: 2019-07-21 | End: 2019-07-21

## 2019-07-21 RX ORDER — AMMONIA INHALANTS 0.04 G/.3ML
0.3 INHALANT RESPIRATORY (INHALATION) AS NEEDED
Status: DISCONTINUED | OUTPATIENT
Start: 2019-07-21 | End: 2019-07-23

## 2019-07-21 RX ORDER — DIPHENHYDRAMINE HYDROCHLORIDE 50 MG/ML
12.5 INJECTION INTRAMUSCULAR; INTRAVENOUS EVERY 4 HOURS PRN
Status: ACTIVE | OUTPATIENT
Start: 2019-07-21 | End: 2019-07-22

## 2019-07-21 RX ORDER — HYDROMORPHONE HYDROCHLORIDE 1 MG/ML
0.6 INJECTION, SOLUTION INTRAMUSCULAR; INTRAVENOUS; SUBCUTANEOUS
Status: ACTIVE | OUTPATIENT
Start: 2019-07-21 | End: 2019-07-22

## 2019-07-21 RX ORDER — DOCUSATE SODIUM 100 MG/1
100 CAPSULE, LIQUID FILLED ORAL
Status: DISCONTINUED | OUTPATIENT
Start: 2019-07-21 | End: 2019-07-23

## 2019-07-21 RX ORDER — IBUPROFEN 600 MG/1
600 TABLET ORAL EVERY 6 HOURS
Status: DISCONTINUED | OUTPATIENT
Start: 2019-07-21 | End: 2019-07-23

## 2019-07-21 RX ORDER — CEFAZOLIN SODIUM/WATER 2 G/20 ML
2 SYRINGE (ML) INTRAVENOUS ONCE
Status: COMPLETED | OUTPATIENT
Start: 2019-07-21 | End: 2019-07-21

## 2019-07-21 RX ORDER — HYDROMORPHONE HYDROCHLORIDE 1 MG/ML
0.6 INJECTION, SOLUTION INTRAMUSCULAR; INTRAVENOUS; SUBCUTANEOUS EVERY 5 MIN PRN
Status: DISCONTINUED | OUTPATIENT
Start: 2019-07-21 | End: 2019-07-21 | Stop reason: ALTCHOICE

## 2019-07-21 RX ORDER — NALOXONE HYDROCHLORIDE 0.4 MG/ML
0.08 INJECTION, SOLUTION INTRAMUSCULAR; INTRAVENOUS; SUBCUTANEOUS
Status: ACTIVE | OUTPATIENT
Start: 2019-07-21 | End: 2019-07-22

## 2019-07-21 RX ORDER — SODIUM CHLORIDE 0.9 % (FLUSH) 0.9 %
10 SYRINGE (ML) INJECTION AS NEEDED
Status: DISCONTINUED | OUTPATIENT
Start: 2019-07-21 | End: 2019-07-23

## 2019-07-21 RX ORDER — METOCLOPRAMIDE 10 MG/1
10 TABLET ORAL ONCE
Status: COMPLETED | OUTPATIENT
Start: 2019-07-21 | End: 2019-07-21

## 2019-07-21 RX ORDER — FAMOTIDINE 20 MG/1
20 TABLET ORAL ONCE
Status: COMPLETED | OUTPATIENT
Start: 2019-07-21 | End: 2019-07-21

## 2019-07-21 RX ORDER — KETOROLAC TROMETHAMINE 30 MG/ML
30 INJECTION, SOLUTION INTRAMUSCULAR; INTRAVENOUS ONCE
Status: COMPLETED | OUTPATIENT
Start: 2019-07-21 | End: 2019-07-21

## 2019-07-21 RX ORDER — HYDROMORPHONE HYDROCHLORIDE 1 MG/ML
0.2 INJECTION, SOLUTION INTRAMUSCULAR; INTRAVENOUS; SUBCUTANEOUS EVERY 5 MIN PRN
Status: DISCONTINUED | OUTPATIENT
Start: 2019-07-21 | End: 2019-07-21 | Stop reason: ALTCHOICE

## 2019-07-21 RX ADMIN — BUPIVACAINE HYDROCHLORIDE 1 ML: 5 INJECTION, SOLUTION EPIDURAL; INTRACAUDAL at 10:10:00

## 2019-07-21 RX ADMIN — CEFAZOLIN SODIUM/WATER 2 G: 2 G/20 ML SYRINGE (ML) INTRAVENOUS at 10:12:00

## 2019-07-21 RX ADMIN — MORPHINE SULFATE 0.3 MG: 1 INJECTION, SOLUTION EPIDURAL; INTRATHECAL; INTRAVENOUS at 10:10:00

## 2019-07-21 RX ADMIN — BUPIVACAINE HYDROCHLORIDE 1.6 ML: 7.5 INJECTION, SOLUTION INTRASPINAL at 10:09:00

## 2019-07-21 RX ADMIN — MORPHINE SULFATE 0.25 MG: 1 INJECTION, SOLUTION EPIDURAL; INTRATHECAL; INTRAVENOUS at 10:09:00

## 2019-07-21 RX ADMIN — SODIUM CHLORIDE: 9 INJECTION, SOLUTION INTRAVENOUS at 10:54:00

## 2019-07-21 RX ADMIN — SODIUM CHLORIDE, SODIUM LACTATE, POTASSIUM CHLORIDE, CALCIUM CHLORIDE: 600; 310; 30; 20 INJECTION, SOLUTION INTRAVENOUS at 10:01:00

## 2019-07-21 RX ADMIN — MIDAZOLAM HYDROCHLORIDE 4 MG: 1 INJECTION INTRAMUSCULAR; INTRAVENOUS at 11:01:00

## 2019-07-21 RX ADMIN — EPHEDRINE SULFATE 15 MG: 50 INJECTION, SOLUTION INTRAVENOUS at 10:09:00

## 2019-07-21 RX ADMIN — LIDOCAINE HYDROCHLORIDE 5 ML: 10 INJECTION, SOLUTION INFILTRATION; PERINEURAL at 10:10:00

## 2019-07-21 NOTE — PROGRESS NOTES
Patient transferred to mother/baby room 364 per cart in stable condition. Accompanied by her mother and staff. Report given to mother/baby RN, Raúl Jones.

## 2019-07-21 NOTE — PROGRESS NOTES
[place] [facility]    OB/GYNE Progress Note      Alla Lory Patient Status:  Inpatient    3/23/1980 MRN [id number]   Location [address] [facility] Attending [facility] Day # 3 PCP Kelly Bain DO

## 2019-07-21 NOTE — ANESTHESIA POSTPROCEDURE EVALUATION
Patient: Mason Henning    Procedure Summary     Date:  19 Room / Location:  [facility] L+D OR  [facility] L+D OR    Anesthesia Start:  1000 Anesthesia Stop:      Procedure:   SECTION (N/A ) Diagnosis:  (same as preop)    Surgeon:  Silvina Wrad MD

## 2019-07-21 NOTE — PROGRESS NOTES
Dr Perry Labs notified that 2 hour postprandial accucheck was 135. No new orders will continue to monitor.

## 2019-07-21 NOTE — ANESTHESIA PROCEDURE NOTES
Spinal Block  Performed by: Song Bower MD  Authorized by: Song Bower MD     Patient Location:  OB  Start Time:  7/21/2019 10:03 AM  End Time:  7/21/2019 10:05 AM  Anesthesiologist:  Song Bower MD  Performed by:   Anesthesiologist  Karthikeyan

## 2019-07-21 NOTE — ANESTHESIA PREPROCEDURE EVALUATION
Anesthesia PreOp Note    HPI:     Nitish Franco is a 35year old female who presents for preoperative consultation requested by: Fe Prince MD    Date of Surgery: 2019    Procedure(s):   SECTION  Indication: Failure to Progress    Rele NIFEdipine ER osmotic release 60 MG Oral Tablet 24 Hr 60 mg 2 (two) times daily.    Disp:  Rfl:  7/18/2019 at Unknown time   metFORMIN HCl  MG Oral Tablet 24 Hr 500 mg with breakfast and 1000 with dinner (Patient taking differently: 1,000 mg 2 (two) Intravenous PRN Garry Tejeda MD 10 mL at 07/19/19 9476    lactated ringers infusion  Intravenous Continuous Garry Tejeda MD Last Rate: 125 mL/hr at 07/20/19 2027    Lidocaine HCl (PF) (XYLOCAINE) 1 % injection SOLN 30 mL 30 mL file      Years of education: Not on file      Highest education level: Not on file    Occupational History      Occupation: health     Social Needs      Financial resource strain: Not on file      Food insecurity:        Worry: Not on file HGB 10.8 (L) 07/18/2019    HCT 33.3 (L) 07/18/2019    MCV 86.3 07/18/2019    MCH 28.0 07/18/2019    MCHC 32.4 07/18/2019    RDW 13.4 07/18/2019    .0 07/18/2019     Lab Results   Component Value Date     05/20/2019    K 3.9 05/20/2019    CL 10

## 2019-07-21 NOTE — PLAN OF CARE
Problem: Patient Centered Care  Goal: Patient preferences are identified and integrated in the patient's plan of care  Description  Interventions:  - What would you like us to know as we care for you?   - Provide timely, complete, and accurate informatio history. Outcome: Progressing  Goal: Optimize infant feeding at the breast  Description  INTERVENTIONS:  - Initiate breast feeding within first hour after birth. - Monitor effectiveness of current breast feeding efforts.   - Assess support systems availa and manage engorgement. - Instruct on breast care. - Provide comfort measures. Outcome: Progressing  Goal: Appropriate maternal -  bonding  Description  INTERVENTIONS:  - Assess caregiver- interactions.   - Assess caregiver's emotional stat

## 2019-07-21 NOTE — OPERATIVE REPORT
[facility]    Post-Operative Note    Jori Ruvalcaba Patient Status:  Inpatient    3/23/1980 MRN [id number]   Location [facility] Attending Gretchen Feliciano[address] [place] [address] Day # 3 PCP DO Krzysztof Malave in appearance. The uterine cavity was swept clean using a wet lap. The bladder blade was replaced. The first layer of the hysterotomy was closed using 0 Vicryl suture. A second imbricating layer was placed with the same suture.   The incision was inspec

## 2019-07-22 PROBLEM — Z98.891 S/P CESAREAN SECTION: Status: ACTIVE | Noted: 2019-07-22

## 2019-07-22 PROBLEM — Z34.90 ENCOUNTER FOR PLANNED INDUCTION OF LABOR (HCC): Status: RESOLVED | Noted: 2019-07-18 | Resolved: 2019-07-22

## 2019-07-22 PROBLEM — Z34.90 ENCOUNTER FOR PLANNED INDUCTION OF LABOR: Status: RESOLVED | Noted: 2019-07-18 | Resolved: 2019-07-22

## 2019-07-22 LAB
BASOPHILS # BLD AUTO: 0.02 X10(3) UL (ref 0–0.2)
BASOPHILS NFR BLD AUTO: 0.3 %
DEPRECATED RDW RBC AUTO: 42.9 FL (ref 35.1–46.3)
EOSINOPHIL # BLD AUTO: 0.07 X10(3) UL (ref 0–0.7)
EOSINOPHIL NFR BLD AUTO: 1.1 %
ERYTHROCYTE [DISTWIDTH] IN BLOOD BY AUTOMATED COUNT: 13.8 % (ref 11–15)
GLUCOSE BLDC GLUCOMTR-MCNC: 104 MG/DL (ref 70–99)
GLUCOSE BLDC GLUCOMTR-MCNC: 108 MG/DL (ref 70–99)
HCT VFR BLD AUTO: 23.6 % (ref 35–48)
HGB BLD-MCNC: 7.4 G/DL (ref 12–16)
IMM GRANULOCYTES # BLD AUTO: 0.03 X10(3) UL (ref 0–1)
IMM GRANULOCYTES NFR BLD: 0.5 %
LYMPHOCYTES # BLD AUTO: 1.29 X10(3) UL (ref 1–4)
LYMPHOCYTES NFR BLD AUTO: 20.2 %
MCH RBC QN AUTO: 27.5 PG (ref 26–34)
MCHC RBC AUTO-ENTMCNC: 31.4 G/DL (ref 31–37)
MCV RBC AUTO: 87.7 FL (ref 80–100)
MONOCYTES # BLD AUTO: 0.51 X10(3) UL (ref 0.1–1)
MONOCYTES NFR BLD AUTO: 8 %
NEUTROPHILS # BLD AUTO: 4.46 X10 (3) UL (ref 1.5–7.7)
NEUTROPHILS # BLD AUTO: 4.46 X10(3) UL (ref 1.5–7.7)
NEUTROPHILS NFR BLD AUTO: 69.9 %
PLATELET # BLD AUTO: 168 10(3)UL (ref 150–450)
RBC # BLD AUTO: 2.69 X10(6)UL (ref 3.8–5.3)
WBC # BLD AUTO: 6.4 X10(3) UL (ref 4–11)

## 2019-07-22 NOTE — PROGRESS NOTES
Postpartum day 1    Temperature is normal, vital signs are stable, hemoglobin is 7.4. No orthostatic changes. Patient does not complain of shortness of breath, palpitations, fainting spells. Currently using breast pump.     She is passing flatus has not

## 2019-07-22 NOTE — LACTATION NOTE
LACTATION NOTE - MOTHER      Evaluation Type: Inpatient    Problems identified  Problems identified: Knowledge deficit;Milk supply not WNL  Milk supply not WNL: Reduced (potential)    Maternal history  Maternal history: AMA;Polycystic ovarian syndrome (PCO pacifiers may interfere with lactogenesis II, especially during the first two weeks postpartum. Informed of safe sleep guidelines. Assisted with positioning and latching infant in cross cradle hold.  Stronly encouraged to pump 8-12 times/24 hours, especiall

## 2019-07-22 NOTE — ANESTHESIA POST-OP FOLLOW-UP NOTE
Ms. Lissette Lema is POD#1 after her  performed under spinal anesthesia. Denies headache, back pain, or residual LE weakness/numbness. Pain is well controlled. Has been ambulating around her room without difficulty today.    No further anesthesi

## 2019-07-22 NOTE — PLAN OF CARE
Vitals and assessment WNL. Pain controlled with Motrin/Norco.  Breastfeeding and pumping; infant formula supplementing. Voiding freely and independent with self and infant care. Pt showered today. LT incision with steri strips AMIRAH.   Fundus firm; lochia Initiate breast feeding within first hour after birth. - Monitor effectiveness of current breast feeding efforts.   - Assess support systems available to mother/family.  - Identify cultural beliefs/practices regarding lactation, letdown techniques, matern to participate in  daily care. - Assess support systems available to mother/family.  - Provide /case management support as needed.   Outcome: Progressing     Problem: GASTROINTESTINAL - ADULT  Goal: Maintains or returns to baseline drew

## 2019-07-23 VITALS
BODY MASS INDEX: 48.21 KG/M2 | DIASTOLIC BLOOD PRESSURE: 66 MMHG | RESPIRATION RATE: 18 BRPM | TEMPERATURE: 98 F | HEART RATE: 92 BPM | OXYGEN SATURATION: 100 % | SYSTOLIC BLOOD PRESSURE: 127 MMHG | HEIGHT: 62 IN | WEIGHT: 262 LBS

## 2019-07-23 LAB
BASOPHILS # BLD AUTO: 0.02 X10(3) UL (ref 0–0.2)
BASOPHILS NFR BLD AUTO: 0.2 %
DEPRECATED RDW RBC AUTO: 42.6 FL (ref 35.1–46.3)
EOSINOPHIL # BLD AUTO: 0.12 X10(3) UL (ref 0–0.7)
EOSINOPHIL NFR BLD AUTO: 1.5 %
ERYTHROCYTE [DISTWIDTH] IN BLOOD BY AUTOMATED COUNT: 14 % (ref 11–15)
HCT VFR BLD AUTO: 24.8 % (ref 35–48)
HGB BLD-MCNC: 7.9 G/DL (ref 12–16)
IMM GRANULOCYTES # BLD AUTO: 0.05 X10(3) UL (ref 0–1)
IMM GRANULOCYTES NFR BLD: 0.6 %
LYMPHOCYTES # BLD AUTO: 2.05 X10(3) UL (ref 1–4)
LYMPHOCYTES NFR BLD AUTO: 25.4 %
MCH RBC QN AUTO: 27.4 PG (ref 26–34)
MCHC RBC AUTO-ENTMCNC: 31.9 G/DL (ref 31–37)
MCV RBC AUTO: 86.1 FL (ref 80–100)
MONOCYTES # BLD AUTO: 0.7 X10(3) UL (ref 0.1–1)
MONOCYTES NFR BLD AUTO: 8.7 %
NEUTROPHILS # BLD AUTO: 5.13 X10 (3) UL (ref 1.5–7.7)
NEUTROPHILS # BLD AUTO: 5.13 X10(3) UL (ref 1.5–7.7)
NEUTROPHILS NFR BLD AUTO: 63.6 %
PLATELET # BLD AUTO: 177 10(3)UL (ref 150–450)
RBC # BLD AUTO: 2.88 X10(6)UL (ref 3.8–5.3)
WBC # BLD AUTO: 8.1 X10(3) UL (ref 4–11)

## 2019-07-23 RX ORDER — IBUPROFEN 600 MG/1
600 TABLET ORAL EVERY 6 HOURS PRN
Qty: 60 TABLET | Refills: 1 | Status: SHIPPED | OUTPATIENT
Start: 2019-07-23 | End: 2019-08-22

## 2019-07-23 RX ORDER — PSEUDOEPHEDRINE HCL 30 MG
100 TABLET ORAL 2 TIMES DAILY PRN
Qty: 40 CAPSULE | Refills: 1 | Status: SHIPPED | OUTPATIENT
Start: 2019-07-23 | End: 2019-08-22

## 2019-07-23 RX ORDER — HYDROCODONE BITARTRATE AND ACETAMINOPHEN 5; 325 MG/1; MG/1
1 TABLET ORAL EVERY 6 HOURS PRN
Qty: 40 TABLET | Refills: 0 | Status: SHIPPED | OUTPATIENT
Start: 2019-07-23 | End: 2019-08-06

## 2019-07-23 NOTE — PLAN OF CARE
Problem: Patient Centered Care  Goal: Patient preferences are identified and integrated in the patient's plan of care  Description  Interventions:  - What would you like us to know as we care for you?  - Provide timely, complete, and accurate information breast  Description  INTERVENTIONS:  - Initiate breast feeding within first hour after birth. - Monitor effectiveness of current breast feeding efforts.   - Assess support systems available to mother/family.  - Identify cultural beliefs/practices regardin measures. Outcome: Progressing  Goal: Appropriate maternal -  bonding  Description  INTERVENTIONS:  - Assess caregiver- interactions. - Assess caregiver's emotional status and coping mechanisms.   - Encourage caregiver to participate in newb

## 2019-07-23 NOTE — LACTATION NOTE
LACTATION NOTE - MOTHER      Evaluation Type: Inpatient    Problems identified  Problems identified: Knowledge deficit;Milk supply not WNL  Milk supply not WNL: Reduced (potential)    Maternal history  Maternal history: AMA; Infertility; Polycystic ovarian s

## 2019-07-23 NOTE — PLAN OF CARE
Problem: Patient Centered Care  Goal: Patient preferences are identified and integrated in the patient's plan of care  Description  Interventions:  - What would you like us to know as we care for you?   - Provide timely, complete, and accurate informatio Utilize standard precautions and use personal protective equipment as indicated. Ensure aseptic care of all intravenous lines and invasive tubes/drains.  - Obtain immunization and exposure to communicable diseases history.   7/23/2019 1424 by Carl Narayanan Progressing  Goal: Establishment of adequate milk supply with medication/procedure interruptions  Description  INTERVENTIONS:  - Review techniques for milk expression (breast pumping).    - Provide pumping equipment/supplies, instructions, and assistance un as appropriate  - Assist with meals as needed  - Monitor I&O, WT and lab values  - Obtain nutritional consult as needed  - Optimize oral hygiene and moisture  - Encourage food from home; allow for food preferences  - Enhance eating environment  7/23/2019 1

## 2019-07-23 NOTE — LACTATION NOTE
This note was copied from a baby's chart. LACTATION NOTE - INFANT    Evaluation Type  Evaluation Type: Inpatient    Problems & Assessment  Problems Diagnosed or Identified: 37-38 weeks gestation; Latch difficulty;Sleepy  Infant Assessment: Skin color: pink

## 2019-07-23 NOTE — DISCHARGE SUMMARY
[facility] - [facility]    Gyne Discharge Summary    Shakila Franz Patient Status:  Inpatient    3/23/1980 MRN [id number]   Location [facility] 3SE Attending [facility] Day # 5 PCP Lauren Luong DO     Date of Admission: 7 Thao Stone MD In 2 weeks.     Specialty:  OBSTETRICS & GYNECOLOGY  Why:  For incision/wound check  Contact information:  Iveth Mathews [facility] [address] [facility] [id number]  [address]  7/23/2019

## 2019-07-23 NOTE — PROGRESS NOTES
Doing well, ready to go home    AVSS  Incision C/D/I  Lochia normal  +1 edema    POD#1 LTCS  - Home today  - Follow up [place] 2w      Demetra Colindres MD  7/23/2019

## 2019-07-26 ENCOUNTER — NURSE ONLY (OUTPATIENT)
Dept: LACTATION | Facility: HOSPITAL | Age: 39
End: 2019-07-26
Attending: OBSTETRICS & GYNECOLOGY
Payer: COMMERCIAL

## 2019-07-26 DIAGNOSIS — O92.79 DISORDER OF LACTATION, POSTPARTUM CONDITION OR COMPLICATION: Primary | ICD-10-CM

## 2019-07-26 PROCEDURE — 99213 OFFICE O/P EST LOW 20 MIN: CPT

## 2019-07-26 NOTE — PROGRESS NOTES
Mom reports pumping at home every 4 hours and getting 2 ounces which is bottle fed to infant. Infant is 8% below birth weight at 11days of age. Parents report that he is sleepy and has to woken up for feedings about every 4 hours.     Mom reports little mi

## 2019-07-26 NOTE — PATIENT INSTRUCTIONS
Infant Discharge Feeding Plan -      Snuggle your baby in skin to skin contact between and during feedings whenever possible. Massage your breasts before nursing or pumping to soften areola if needed.     Breastfeed with hunger cues, most babies wi ? Let you baby “latch on” to bottle: stroke nipple down from top lip to bottom, licking is good, wait for wide mouth, tongue cupped at bottom of mouth. ? Tip the bottle up just far enough that there is not air in the nipple.   ? Pausing mimics breastfeedin ? Discuss use of all purpose nipple ointment with your OB doctor. ? Call doctor if nipple has signs of infection: red/deep pink, drainage (pus), increased pain, fever. Follow-up:  ? Call lactation [phone] as needed.  Schedule follow-up lactation ? Continue above treatment for relieving plugged area(s)  ? Continue to take antibiotic as prescribed even though you may quickly feel better. ?  Contact your doctor is you are not feeling significantly better within 1-2 days of starting antibiotic, soone

## 2019-07-31 ENCOUNTER — TELEPHONE (OUTPATIENT)
Dept: OBGYN CLINIC | Facility: CLINIC | Age: 39
End: 2019-07-31

## 2019-07-31 ENCOUNTER — NURSE ONLY (OUTPATIENT)
Dept: LACTATION | Facility: HOSPITAL | Age: 39
End: 2019-07-31
Attending: OBSTETRICS & GYNECOLOGY
Payer: COMMERCIAL

## 2019-07-31 PROCEDURE — 99212 OFFICE O/P EST SF 10 MIN: CPT

## 2019-07-31 NOTE — TELEPHONE ENCOUNTER
Patient requesting an call back in regard to yesterday conversation with Dr. Omayra Willard looking to see if she is able to  prescription for Hydrocodone

## 2019-07-31 NOTE — TELEPHONE ENCOUNTER
EB's msg relayed to pt and explained to pt that she has to  Rx personally, present valid picture ID; open today till 8pm; to be picked up from . Pt verbalized understanding and agrees with POC.

## 2019-07-31 NOTE — PROGRESS NOTES
Mom and her partner are back with infant for a follow up lactation visit after five days. Mom states that she has been exclusively breastfeeding for the last five days, not really supplementing after feeds.  In five days, infant only gained 1/4 of an ounce,

## 2019-07-31 NOTE — TELEPHONE ENCOUNTER
Left VM r/t med refill request    (Script was placed at  per protocol for pt to  personally; pt to present valid picture ID prior to ).

## 2019-07-31 NOTE — TELEPHONE ENCOUNTER
July 30, 2019   Arturo Santiago   to Savannah Lopez MD            4:16 PM   ok. I will come by the office tomorrow (Wednesday) to  another prescription. You had written the prescription for 48 pills but they only gave me 28 pills.  I have 3 left

## 2019-08-06 ENCOUNTER — NURSE ONLY (OUTPATIENT)
Dept: LACTATION | Facility: HOSPITAL | Age: 39
End: 2019-08-06
Attending: OBSTETRICS & GYNECOLOGY
Payer: COMMERCIAL

## 2019-08-06 DIAGNOSIS — O92.79 DISORDER OF LACTATION, POSTPARTUM CONDITION OR COMPLICATION: Primary | ICD-10-CM

## 2019-08-06 PROCEDURE — 99213 OFFICE O/P EST LOW 20 MIN: CPT

## 2019-08-06 NOTE — PROGRESS NOTES
LACTATION NOTE - MOTHER      Evaluation Type: Outpatient Follow Up    Problems identified  Problems identified: Knowledge deficit;Milk supply not WNL  Milk supply not WNL: Reduced (potential)  Problems Identified Other: poor feeding and insufficient breast

## 2019-08-06 NOTE — PATIENT INSTRUCTIONS
Infant Discharge Feeding Plan -      Snuggle your baby in skin to skin contact between and during feedings whenever possible. Massage your breasts before nursing or pumping to soften areola if needed.     Breastfeed with hunger cues, most babies wi Paced bottle feeding using a slow flow nipple:     ? Hold your baby in an upright position, supporting the hand and neck with your hand, rather than in the crook of your arm. ?  Let you baby “latch on” to bottle: stroke nipple down from top lip to bottom, Get one or two Haakaa-type breast pumps and you can use that on the opposite breast while you're feeding Ritesh. Make sure Donna Bill is pulled closely into your body and his lips are flared out around your breast.   Do breast compression frequently.   Supplem

## 2019-08-07 ENCOUNTER — POSTPARTUM (OUTPATIENT)
Dept: OBGYN CLINIC | Facility: CLINIC | Age: 39
End: 2019-08-07

## 2019-08-07 VITALS — WEIGHT: 234.69 LBS | BODY MASS INDEX: 43.19 KG/M2 | HEIGHT: 62 IN

## 2019-08-07 DIAGNOSIS — Z98.891 S/P CESAREAN SECTION: Primary | ICD-10-CM

## 2019-08-07 NOTE — PROGRESS NOTES
Jori Ruvalcaba is a 35year old female. HPI:   Patient presents with:  Postpartum Care:  done 2019 by Prudence Roles    Here for 2 week incision check, doing well. Only needing Ibuprofen now for pain.     Medications (Active prior to today's visit

## 2019-08-21 ENCOUNTER — PATIENT MESSAGE (OUTPATIENT)
Dept: OBGYN CLINIC | Facility: CLINIC | Age: 39
End: 2019-08-21

## 2019-08-23 ENCOUNTER — TELEPHONE (OUTPATIENT)
Dept: OBGYN CLINIC | Facility: CLINIC | Age: 39
End: 2019-08-23

## 2019-08-23 NOTE — TELEPHONE ENCOUNTER
Pt had c/s 7/21/19. Pt was given a note to be off of work for the 8 weeks after her delivery. Pt was last seen on 8/7/2019 for a pp visit and has another visit on 9*13/2019.     Pt is requesting a note to be off of work for 6 months to breastfeed and spend

## 2020-05-17 ENCOUNTER — TELEPHONE (OUTPATIENT)
Dept: ENDOCRINOLOGY CLINIC | Facility: CLINIC | Age: 40
End: 2020-05-17

## 2020-05-18 RX ORDER — METFORMIN HYDROCHLORIDE 500 MG/1
TABLET, EXTENDED RELEASE ORAL
Qty: 90 TABLET | Refills: 0 | Status: SHIPPED | OUTPATIENT
Start: 2020-05-18 | End: 2021-08-03

## 2020-06-12 RX ORDER — METFORMIN HYDROCHLORIDE 500 MG/1
TABLET, EXTENDED RELEASE ORAL
Qty: 90 TABLET | Refills: 0 | OUTPATIENT
Start: 2020-06-12

## 2020-08-17 RX ORDER — METFORMIN HYDROCHLORIDE 500 MG/1
TABLET, EXTENDED RELEASE ORAL
Qty: 90 TABLET | Refills: 0 | OUTPATIENT
Start: 2020-08-17

## 2020-08-17 NOTE — TELEPHONE ENCOUNTER
DO NOT FILL  Per enc 7/24/20. Pt has now switched care to new Endocrinologist. Called pharmacy to inform. Pharmacy understood, no further questions.

## 2021-07-02 LAB
AMB EXT HBSAG SCREEN: NON REACTIVE
AMB EXT HEMATOCRIT: 40.2
AMB EXT HEMOGLOBIN: 12.6
AMB EXT HGBA1C: 5.7 %
AMB EXT HIV AG AB COMBO: NON REACTIVE
AMB EXT MCV: 89
AMB EXT PLATELETS: 231
AMB EXT RUBELLA ANTIBODIES, IGG: POSITIVE
AMB EXT TREPONEMAL ANTIBODIES: NEGATIVE
AMB EXT URINE CULTURE ROUTINE: >=3

## 2021-07-13 LAB
AMB EXT 1 HR GLUCOSE GESTATIONAL: 198
AMB EXT 2 HR GLUCOSE GESTATIONAL: 173
AMB EXT 3 HR GLUCOSE GESTATIONAL: 123
AMB EXT FASTING GLUCOSE GESTATIONAL: 104

## 2021-08-03 ENCOUNTER — OFFICE VISIT (OUTPATIENT)
Dept: OBGYN CLINIC | Facility: CLINIC | Age: 41
End: 2021-08-03

## 2021-08-03 VITALS
HEIGHT: 62 IN | BODY MASS INDEX: 50.42 KG/M2 | WEIGHT: 274 LBS | DIASTOLIC BLOOD PRESSURE: 96 MMHG | SYSTOLIC BLOOD PRESSURE: 142 MMHG

## 2021-08-03 DIAGNOSIS — O09.529 ANTEPARTUM MULTIGRAVIDA OF ADVANCED MATERNAL AGE: ICD-10-CM

## 2021-08-03 DIAGNOSIS — O34.219 PREVIOUS CESAREAN DELIVERY AFFECTING PREGNANCY: ICD-10-CM

## 2021-08-03 DIAGNOSIS — O99.210 OBESITY IN PREGNANCY: ICD-10-CM

## 2021-08-03 DIAGNOSIS — O09.811 PREGNANCY RESULTING FROM IN VITRO FERTILIZATION IN FIRST TRIMESTER: ICD-10-CM

## 2021-08-03 DIAGNOSIS — O22.30 DVT (DEEP VEIN THROMBOSIS) IN PREGNANCY: ICD-10-CM

## 2021-08-03 DIAGNOSIS — O10.019 PRE-EXISTING ESSENTIAL HYPERTENSION DURING PREGNANCY, ANTEPARTUM: Primary | ICD-10-CM

## 2021-08-03 DIAGNOSIS — O24.119 PRE-EXISTING TYPE 2 DIABETES MELLITUS DURING PREGNANCY, ANTEPARTUM: ICD-10-CM

## 2021-08-03 PROCEDURE — 3008F BODY MASS INDEX DOCD: CPT | Performed by: OBSTETRICS & GYNECOLOGY

## 2021-08-03 PROCEDURE — 99215 OFFICE O/P EST HI 40 MIN: CPT | Performed by: OBSTETRICS & GYNECOLOGY

## 2021-08-03 PROCEDURE — 3080F DIAST BP >= 90 MM HG: CPT | Performed by: OBSTETRICS & GYNECOLOGY

## 2021-08-03 PROCEDURE — 3077F SYST BP >= 140 MM HG: CPT | Performed by: OBSTETRICS & GYNECOLOGY

## 2021-08-03 RX ORDER — ENOXAPARIN SODIUM 150 MG/ML
INJECTION SUBCUTANEOUS
COMMUNITY
Start: 2021-07-23 | End: 2022-01-26

## 2021-08-03 RX ORDER — LABETALOL 200 MG/1
400 TABLET, FILM COATED ORAL 2 TIMES DAILY
COMMUNITY
Start: 2020-04-16 | End: 2021-09-16

## 2021-08-03 RX ORDER — FOLIC ACID 1 MG/1
TABLET ORAL
COMMUNITY
Start: 2021-03-15 | End: 2021-08-24

## 2021-08-03 RX ORDER — FLASH GLUCOSE SCANNING READER
1 EACH MISCELLANEOUS 4 TIMES DAILY
COMMUNITY
Start: 2021-07-19 | End: 2022-01-26

## 2021-08-03 RX ORDER — FLASH GLUCOSE SENSOR
1 KIT MISCELLANEOUS 4 TIMES DAILY
COMMUNITY
Start: 2021-07-19 | End: 2021-11-19

## 2021-08-03 NOTE — PROGRESS NOTES
OFFICE VISIT FOR CONFIRMATION OF PREGNANCY    8/3/2021  4:41 PM    CC: Patient is here for transfer of care.      HPI: Patient is a 42year old  Patient's last menstrual period was 2021. 14w6d Estimated Date of Delivery: 22 who presents fo Father    • Diabetes Mother    • Hypertension Mother    • No Known Problems Maternal Grandmother    • No Known Problems Maternal Grandfather    • Glaucoma Neg    • Retinal detachment Neg    • Macular degeneration Neg      Social History    Socioeconomic Hi Services:       Active Member of Clubs or Organizations:       Attends Club or Organization Meetings:       Marital Status:   Intimate Partner Violence:       Fear of Current or Ex-Partner:       Emotionally Abused:       Physically Abused:       Sexually

## 2021-08-04 ENCOUNTER — PATIENT MESSAGE (OUTPATIENT)
Dept: OBGYN CLINIC | Facility: CLINIC | Age: 41
End: 2021-08-04

## 2021-08-04 NOTE — TELEPHONE ENCOUNTER
LMTCB, orders placed based upon KATHI's note  Filomena Aguirre,      Can you provide the pharmacy you wish for the order to be sent?   You can three on file.     Any concerns or additional questions, please call us at .         Last visit 08/03/21 KATHI VIERA

## 2021-08-06 RX ORDER — LABETALOL 200 MG/1
400 TABLET, FILM COATED ORAL 2 TIMES DAILY
Qty: 360 TABLET | Refills: 1 | Status: SHIPPED | OUTPATIENT
Start: 2021-08-06 | End: 2021-12-26

## 2021-08-10 ENCOUNTER — MED REC SCAN ONLY (OUTPATIENT)
Dept: OBGYN CLINIC | Facility: CLINIC | Age: 41
End: 2021-08-10

## 2021-08-24 ENCOUNTER — ROUTINE PRENATAL (OUTPATIENT)
Dept: OBGYN CLINIC | Facility: CLINIC | Age: 41
End: 2021-08-24

## 2021-08-24 VITALS
SYSTOLIC BLOOD PRESSURE: 140 MMHG | BODY MASS INDEX: 51.16 KG/M2 | WEIGHT: 278 LBS | HEIGHT: 62 IN | DIASTOLIC BLOOD PRESSURE: 86 MMHG

## 2021-08-24 DIAGNOSIS — O09.519 SUPERVISION OF HIGH-RISK PREGNANCY OF ELDERLY PRIMIGRAVIDA: Primary | ICD-10-CM

## 2021-08-24 PROBLEM — O16.3 HYPERTENSION AFFECTING PREGNANCY IN THIRD TRIMESTER (HCC): Chronic | Status: ACTIVE | Noted: 2019-01-10

## 2021-08-24 PROBLEM — E28.2 PCOS (POLYCYSTIC OVARIAN SYNDROME): Chronic | Status: ACTIVE | Noted: 2019-01-10

## 2021-08-24 PROBLEM — O09.811 PREGNANCY RESULTING FROM IN VITRO FERTILIZATION IN FIRST TRIMESTER (HCC): Chronic | Status: ACTIVE | Noted: 2019-01-10

## 2021-08-24 PROBLEM — O34.219 PREVIOUS CESAREAN DELIVERY AFFECTING PREGNANCY: Chronic | Status: ACTIVE | Noted: 2019-07-22

## 2021-08-24 PROBLEM — O22.30 DVT (DEEP VEIN THROMBOSIS) IN PREGNANCY (HCC): Chronic | Status: ACTIVE | Noted: 2021-08-03

## 2021-08-24 PROBLEM — O16.3 HYPERTENSION AFFECTING PREGNANCY IN THIRD TRIMESTER: Chronic | Status: ACTIVE | Noted: 2019-01-10

## 2021-08-24 PROBLEM — H43.393 FLOATERS IN VISUAL FIELD, BILATERAL: Chronic | Status: ACTIVE | Noted: 2019-05-14

## 2021-08-24 PROBLEM — O22.30 DVT (DEEP VEIN THROMBOSIS) IN PREGNANCY: Chronic | Status: ACTIVE | Noted: 2021-08-03

## 2021-08-24 PROBLEM — O10.019 PRE-EXISTING ESSENTIAL HYPERTENSION DURING PREGNANCY, ANTEPARTUM: Chronic | Status: ACTIVE | Noted: 2021-08-03

## 2021-08-24 PROBLEM — E88.819: Chronic | Status: ACTIVE | Noted: 2019-01-23

## 2021-08-24 PROBLEM — O10.019 PRE-EXISTING ESSENTIAL HYPERTENSION DURING PREGNANCY, ANTEPARTUM (HCC): Chronic | Status: ACTIVE | Noted: 2021-08-03

## 2021-08-24 PROBLEM — E88.819 PREGNANCY COMPLICATED BY INSULIN RESISTANCE: Chronic | Status: ACTIVE | Noted: 2019-01-23

## 2021-08-24 PROBLEM — O26.899 PREGNANCY COMPLICATED BY INSULIN RESISTANCE: Chronic | Status: ACTIVE | Noted: 2019-01-23

## 2021-08-24 PROBLEM — O26.899: Chronic | Status: ACTIVE | Noted: 2019-01-23

## 2021-08-24 PROBLEM — O34.219 PREVIOUS CESAREAN DELIVERY AFFECTING PREGNANCY (HCC): Chronic | Status: ACTIVE | Noted: 2019-07-22

## 2021-08-24 PROBLEM — O09.811 PREGNANCY RESULTING FROM IN VITRO FERTILIZATION IN FIRST TRIMESTER: Chronic | Status: ACTIVE | Noted: 2019-01-10

## 2021-08-24 PROCEDURE — 3079F DIAST BP 80-89 MM HG: CPT | Performed by: OBSTETRICS & GYNECOLOGY

## 2021-08-24 PROCEDURE — 3077F SYST BP >= 140 MM HG: CPT | Performed by: OBSTETRICS & GYNECOLOGY

## 2021-08-24 PROCEDURE — 3008F BODY MASS INDEX DOCD: CPT | Performed by: OBSTETRICS & GYNECOLOGY

## 2021-08-24 NOTE — PROGRESS NOTES
41 y/o n at 15 W with  1) pregestational type II DM - on metformin.    2.) CHTN - on labetalol  3.) AMA - normal cell free DNA  4.) DVT - on lovenox  5.) previous c/s  6.) maternal obesity

## 2021-08-26 ENCOUNTER — LAB ENCOUNTER (OUTPATIENT)
Dept: LAB | Facility: HOSPITAL | Age: 41
End: 2021-08-26
Attending: OBSTETRICS & GYNECOLOGY
Payer: COMMERCIAL

## 2021-08-26 DIAGNOSIS — O09.519 SUPERVISION OF HIGH-RISK PREGNANCY OF ELDERLY PRIMIGRAVIDA: ICD-10-CM

## 2021-08-26 PROCEDURE — 82105 ALPHA-FETOPROTEIN SERUM: CPT

## 2021-08-26 PROCEDURE — 36415 COLL VENOUS BLD VENIPUNCTURE: CPT

## 2021-08-29 LAB
AFP SMOKING: NO
FAMILY HX NEURAL TUBE DEFECT: NO
INSULIN REQ MATERNAL DIABETES: YES
MATERNAL AGE OF DELIVERY: 42.8 YR
MOM FOR AFP: 2.03
PATIENT'S AFP: 49 NG/ML

## 2021-08-30 ENCOUNTER — TELEPHONE (OUTPATIENT)
Dept: OBGYN CLINIC | Facility: CLINIC | Age: 41
End: 2021-08-30

## 2021-08-30 PROBLEM — O28.0 ABNORMAL MSAFP (MATERNAL SERUM ALPHA-FETOPROTEIN), ELEVATED: Status: ACTIVE | Noted: 2021-08-30

## 2021-08-30 NOTE — TELEPHONE ENCOUNTER
Collected:  8/26/2021  1:42 PM Status:  Final result Dx:  Supervision of high-risk pregnancy of. ..      Component   Ref Range & Units 8/26/21  1:42 PM   Patient's AFP   ng/mL 49    Mom For AFP  2.03    Maternal Screen Interpretation  Screen PosAbnormal

## 2021-09-13 NOTE — PROGRESS NOTES
Outpatient Maternal-Fetal Medicine Consultation    Dear Dr. Clifford Carlson    Thank you for requesting ultrasound evaluation and maternal fetal medicine consultation on your patient Kehinde Mason.   As you are aware she is a 42year old female  w metFORMIN HCl ER, MOD, 500 MG Oral Tablet 24 Hr, Take 1 tablet by mouth 2 (two) times daily with meals. (Patient taking differently: Take 1 tablet by mouth 2 (two) times daily with meals.  Taking one 500 mg metfomin per day not extended release), Disp: 180 recommended. The limitations of ultrasound were discussed. See PACS/Imaging Tab For Complete Ultrasound Report  I interpreted the results and reviewed them with the patient.     DISCUSSION  During her visit we discussed and reviewed the following issues: options. Currently non-invasive pregnancy testing (NIPT) offers the highest detection rate (with the lowest false positive rate) for the detection of fetal aneuploidy amongst high-risk patients.   The limitations of detailed mid-trimester sonography was re undiagnosed pregestational diabetes in the first trimester.    Glucose intolerance associated with gestational diabetes generally resolves postpartum; however, obese women with a history of gestational diabetes have a two-fold increased prevalence of subseq potential implications associated with chronic hypertension. I informed the patient that chronic hypertension increases the risk of pre-eclampsia, placental abruption, IUGR and fetal demise and possible need for  delivery.   The signs and symptoms teratogenic. · Synthetic heparin pentasaccharides (er, fondaparinux,vidraparinux) are avoided due to a paucity of safety data for these agents.   · Monitoring of anticoagulant activity tends to be more vigilant because less is known about the appropriate d findings are extrapolated from clinical trials in non-pregnant patients.  In a meta-analysis of 22 randomized trials (8867 patients), subcutaneous LMWH decreased mortality (odds ratio 0.76, 95% CI 0.62-0.92) and recurrent thrombosis (odds ratio 0.68, 95% CI an increase or decrease of 10 to 25 percent. The anti-Xa level may be measured four hours after the third injection that follows a dose adjustment.  Once a satisfactory anti-Xa level is reached, some clinicians recheck the level every one to three months, a six hours after the first subcutaneous UFH dose and then six hours after every dose adjustment until a stable dose that produces the desired therapeutic level is achieved.  Once a stable dose of subcutaneous UFH is achieved, the aPTT may be initially checke Neuraxial anesthesia should not be administered to an anticoagulated patient.   In cases in which  delivery is anticipated (eg, triplets,  rupture of membranes, significant cervical dilation, preeclampsia, growth restriction), it is common to (IUFD). Infants of diabetic mothers (IDMs) are also at increased risk for hypoglycemia and hyperbilirubinemia.     Congenital Malformations:  Data from multiple studies have consistently shown a higher risk of major congenital malformations and miscarriage Impaired fetal growth is more common among women with diabetic vasculopathy and/or superimposed preeclampsia. It is associated with increased fetal and  morbidity and mortality, and has long-term health implications.   If there is evidence of intr 32 weeks of gestation and increasing the frequency of testing to two times per week from 36 weeks until delivery.  In complicated patients (IUGR, oligohydramnios, preeclampsia, or poorly controlled blood glucose concentrations) testing may start earlier in glucoses 4x/day  Fetal heart study at 24 wks    Monthly growth US in third trimester  Weekly NST at 32 wks / twice weekly at  34  wks      Hgb A1C q trimester  Baseline urine P/C ratio , CMP or AST/ALT/creatinine, TSH  EKG     Ophthalmology exam   Home BP

## 2021-09-14 ENCOUNTER — HOSPITAL ENCOUNTER (OUTPATIENT)
Dept: PERINATAL CARE | Facility: HOSPITAL | Age: 41
Discharge: HOME | End: 2021-09-14
Attending: OBSTETRICS & GYNECOLOGY
Payer: COMMERCIAL

## 2021-09-14 VITALS
SYSTOLIC BLOOD PRESSURE: 143 MMHG | HEART RATE: 98 BPM | BODY MASS INDEX: 51 KG/M2 | WEIGHT: 277 LBS | DIASTOLIC BLOOD PRESSURE: 82 MMHG

## 2021-09-14 DIAGNOSIS — O24.119 PRE-EXISTING TYPE 2 DIABETES MELLITUS DURING PREGNANCY, ANTEPARTUM: Primary | ICD-10-CM

## 2021-09-14 DIAGNOSIS — Z86.718: ICD-10-CM

## 2021-09-14 DIAGNOSIS — O09.892: ICD-10-CM

## 2021-09-14 DIAGNOSIS — O99.210 OBESITY IN PREGNANCY: ICD-10-CM

## 2021-09-14 DIAGNOSIS — O24.119 PRE-EXISTING TYPE 2 DIABETES MELLITUS DURING PREGNANCY, ANTEPARTUM: ICD-10-CM

## 2021-09-14 DIAGNOSIS — O10.019 PRE-EXISTING ESSENTIAL HYPERTENSION DURING PREGNANCY, ANTEPARTUM: Chronic | ICD-10-CM

## 2021-09-14 DIAGNOSIS — O09.522 ADVANCED MATERNAL AGE IN MULTIGRAVIDA, SECOND TRIMESTER: ICD-10-CM

## 2021-09-14 PROCEDURE — 76811 OB US DETAILED SNGL FETUS: CPT | Performed by: OBSTETRICS & GYNECOLOGY

## 2021-09-14 PROCEDURE — 99244 OFF/OP CNSLTJ NEW/EST MOD 40: CPT | Performed by: OBSTETRICS & GYNECOLOGY

## 2021-09-15 ENCOUNTER — TELEPHONE (OUTPATIENT)
Dept: OBGYN CLINIC | Facility: CLINIC | Age: 41
End: 2021-09-15

## 2021-09-15 RX ORDER — METFORMIN HYDROCHLORIDE 500 MG/1
500 TABLET, EXTENDED RELEASE ORAL 2 TIMES DAILY
Qty: 180 TABLET | Refills: 1 | Status: SHIPPED | OUTPATIENT
Start: 2021-09-15 | End: 2021-11-18

## 2021-09-15 NOTE — TELEPHONE ENCOUNTER
RN spoke to patient, corrected rx. New rx sent to pharmacy. Disp Refills Start End    metFORMIN HCl  MG Oral Tablet 24 Hr 180 tablet 1 9/15/2021     Sig - Route: Take 1 tablet (500 mg total) by mouth 2 (two) times a day.  - Oral    Sent to [facility]

## 2021-09-15 NOTE — TELEPHONE ENCOUNTER
Pt calling stating there was an issue regarding the following medication:    metFORMIN HCl ER, MOD, 500 MG Oral Tablet 24 Hr    She did not specify what issue was due to complexity and prefers to speak with RN to discuss in detail. General

## 2021-09-16 ENCOUNTER — ROUTINE PRENATAL (OUTPATIENT)
Dept: OBGYN CLINIC | Facility: CLINIC | Age: 41
End: 2021-09-16

## 2021-09-16 VITALS
BODY MASS INDEX: 50.61 KG/M2 | WEIGHT: 275 LBS | HEIGHT: 62 IN | DIASTOLIC BLOOD PRESSURE: 80 MMHG | SYSTOLIC BLOOD PRESSURE: 114 MMHG

## 2021-09-16 DIAGNOSIS — O24.119 TYPE 2 DIABETES MELLITUS AFFECTING PREGNANCY, ANTEPARTUM: Primary | ICD-10-CM

## 2021-09-16 PROBLEM — O09.529 SUPERVISION OF HIGH-RISK PREGNANCY OF ELDERLY MULTIGRAVIDA: Chronic | Status: ACTIVE | Noted: 2019-01-10

## 2021-09-16 PROBLEM — O09.529 SUPERVISION OF HIGH-RISK PREGNANCY OF ELDERLY MULTIGRAVIDA (HCC): Chronic | Status: ACTIVE | Noted: 2019-01-10

## 2021-09-16 PROBLEM — O09.519 SUPERVISION OF HIGH-RISK PREGNANCY OF ELDERLY PRIMIGRAVIDA: Chronic | Status: ACTIVE | Noted: 2019-01-10

## 2021-09-16 PROBLEM — O09.529 SUPERVISION OF HIGH-RISK PREGNANCY OF ELDERLY MULTIGRAVIDA: Status: ACTIVE | Noted: 2019-01-10

## 2021-09-16 PROBLEM — O09.519 SUPERVISION OF HIGH-RISK PREGNANCY OF ELDERLY PRIMIGRAVIDA (HCC): Chronic | Status: ACTIVE | Noted: 2019-01-10

## 2021-09-16 PROBLEM — O09.529 SUPERVISION OF HIGH-RISK PREGNANCY OF ELDERLY MULTIGRAVIDA (HCC): Status: ACTIVE | Noted: 2019-01-10

## 2021-09-16 LAB
GLUCOSE (URINE DIPSTICK): NEGATIVE MG/DL
LEUKOCYTES: NEGATIVE
MULTISTIX EXPIRATION DATE: ABNORMAL DATE
MULTISTIX LOT#: ABNORMAL NUMERIC
NITRITE, URINE: NEGATIVE
OCCULT BLOOD: NEGATIVE
PH, URINE: 6 (ref 4.5–8)
SPECIFIC GRAVITY: >=1.03 (ref 1–1.03)
URINE-COLOR: YELLOW
UROBILINOGEN,SEMI-QN: 0.2 MG/DL (ref 0–1.9)

## 2021-09-16 PROCEDURE — 81002 URINALYSIS NONAUTO W/O SCOPE: CPT | Performed by: OBSTETRICS & GYNECOLOGY

## 2021-09-16 PROCEDURE — 3074F SYST BP LT 130 MM HG: CPT | Performed by: OBSTETRICS & GYNECOLOGY

## 2021-09-16 PROCEDURE — 3008F BODY MASS INDEX DOCD: CPT | Performed by: OBSTETRICS & GYNECOLOGY

## 2021-09-16 PROCEDURE — 87086 URINE CULTURE/COLONY COUNT: CPT | Performed by: OBSTETRICS & GYNECOLOGY

## 2021-09-16 PROCEDURE — 3079F DIAST BP 80-89 MM HG: CPT | Performed by: OBSTETRICS & GYNECOLOGY

## 2021-09-16 RX ORDER — ASPIRIN 81 MG/1
120 TABLET, CHEWABLE ORAL DAILY
Qty: 180 TABLET | Refills: 3 | Status: SHIPPED | OUTPATIENT
Start: 2021-09-16 | End: 2021-12-26

## 2021-09-16 NOTE — PROGRESS NOTES
Saw MFM 2 days ago and had usn. Plans FU usn at 24 W  BS were normal. FBS 80-90, and 2 hour PP are normal. Taking metformin.       Recommendations are as follows:  · Check glucoses 4x/day  · Fetal heart study at 24 wks    · Monthly growth US in third trimes

## 2021-09-22 ENCOUNTER — LAB ENCOUNTER (OUTPATIENT)
Dept: LAB | Facility: HOSPITAL | Age: 41
End: 2021-09-22
Attending: OBSTETRICS & GYNECOLOGY
Payer: COMMERCIAL

## 2021-09-22 ENCOUNTER — LAB ENCOUNTER (OUTPATIENT)
Dept: LAB | Facility: REFERENCE LAB | Age: 41
End: 2021-09-22
Attending: OBSTETRICS & GYNECOLOGY
Payer: COMMERCIAL

## 2021-09-22 DIAGNOSIS — O24.119 TYPE 2 DIABETES MELLITUS AFFECTING PREGNANCY, ANTEPARTUM: ICD-10-CM

## 2021-09-22 DIAGNOSIS — U07.1 COVID-19: ICD-10-CM

## 2021-09-22 LAB
ALBUMIN SERPL-MCNC: 2.7 G/DL (ref 3.4–5)
ALBUMIN/GLOB SERPL: 0.7 {RATIO} (ref 1–2)
ALP LIVER SERPL-CCNC: 69 U/L
ALT SERPL-CCNC: 20 U/L
ANION GAP SERPL CALC-SCNC: 6 MMOL/L (ref 0–18)
AST SERPL-CCNC: 13 U/L (ref 15–37)
BILIRUB SERPL-MCNC: 0.2 MG/DL (ref 0.1–2)
BUN BLD-MCNC: 10 MG/DL (ref 7–18)
BUN/CREAT SERPL: 17.2 (ref 10–20)
CALCIUM BLD-MCNC: 8.8 MG/DL (ref 8.5–10.1)
CHLORIDE SERPL-SCNC: 108 MMOL/L (ref 98–112)
CO2 SERPL-SCNC: 23 MMOL/L (ref 21–32)
CREAT BLD-MCNC: 0.58 MG/DL
CREAT UR-SCNC: 205 MG/DL
EST. AVERAGE GLUCOSE BLD GHB EST-MCNC: 117 MG/DL (ref 68–126)
GLOBULIN PLAS-MCNC: 4 G/DL (ref 2.8–4.4)
GLUCOSE BLD-MCNC: 90 MG/DL (ref 70–99)
HBA1C MFR BLD HPLC: 5.7 % (ref ?–5.7)
OSMOLALITY SERPL CALC.SUM OF ELEC: 283 MOSM/KG (ref 275–295)
PATIENT FASTING Y/N/NP: NO
POTASSIUM SERPL-SCNC: 3.9 MMOL/L (ref 3.5–5.1)
PROT SERPL-MCNC: 6.7 G/DL (ref 6.4–8.2)
PROT UR-MCNC: 31.1 MG/DL
SODIUM SERPL-SCNC: 137 MMOL/L (ref 136–145)
TSI SER-ACNC: 1.01 MIU/ML (ref 0.36–3.74)

## 2021-09-22 PROCEDURE — 84156 ASSAY OF PROTEIN URINE: CPT | Performed by: OBSTETRICS & GYNECOLOGY

## 2021-09-22 PROCEDURE — 36415 COLL VENOUS BLD VENIPUNCTURE: CPT

## 2021-09-22 PROCEDURE — 80053 COMPREHEN METABOLIC PANEL: CPT

## 2021-09-22 PROCEDURE — 82570 ASSAY OF URINE CREATININE: CPT | Performed by: OBSTETRICS & GYNECOLOGY

## 2021-09-22 PROCEDURE — 83036 HEMOGLOBIN GLYCOSYLATED A1C: CPT

## 2021-09-22 PROCEDURE — 84443 ASSAY THYROID STIM HORMONE: CPT

## 2021-09-23 LAB — SARS-COV-2 RNA RESP QL NAA+PROBE: NOT DETECTED

## 2021-10-14 ENCOUNTER — HOSPITAL ENCOUNTER (OUTPATIENT)
Dept: PERINATAL CARE | Facility: HOSPITAL | Age: 41
Discharge: HOME | End: 2021-10-14
Attending: OBSTETRICS & GYNECOLOGY
Payer: COMMERCIAL

## 2021-10-14 VITALS
SYSTOLIC BLOOD PRESSURE: 147 MMHG | HEART RATE: 92 BPM | WEIGHT: 281 LBS | BODY MASS INDEX: 51 KG/M2 | DIASTOLIC BLOOD PRESSURE: 83 MMHG

## 2021-10-14 DIAGNOSIS — O34.219 PREVIOUS CESAREAN DELIVERY AFFECTING PREGNANCY: Chronic | ICD-10-CM

## 2021-10-14 DIAGNOSIS — E11.9 TYPE 2 DIABETES MELLITUS WITHOUT COMPLICATION, WITHOUT LONG-TERM CURRENT USE OF INSULIN (HCC): ICD-10-CM

## 2021-10-14 DIAGNOSIS — O09.812 PREGNANCY RESULTING FROM ASSISTED REPRODUCTIVE TECHNOLOGY IN SECOND TRIMESTER: ICD-10-CM

## 2021-10-14 DIAGNOSIS — O99.210 OBESITY IN PREGNANCY: ICD-10-CM

## 2021-10-14 DIAGNOSIS — O09.892: ICD-10-CM

## 2021-10-14 DIAGNOSIS — O28.0 ABNORMAL MSAFP (MATERNAL SERUM ALPHA-FETOPROTEIN), ELEVATED: ICD-10-CM

## 2021-10-14 DIAGNOSIS — O24.119 PRE-EXISTING TYPE 2 DIABETES MELLITUS DURING PREGNANCY, ANTEPARTUM: ICD-10-CM

## 2021-10-14 DIAGNOSIS — O09.529 SUPERVISION OF HIGH-RISK PREGNANCY OF ELDERLY MULTIGRAVIDA: Chronic | ICD-10-CM

## 2021-10-14 DIAGNOSIS — Z86.718: ICD-10-CM

## 2021-10-14 DIAGNOSIS — O09.812 PREGNANCY RESULTING FROM ASSISTED REPRODUCTIVE TECHNOLOGY IN SECOND TRIMESTER: Primary | ICD-10-CM

## 2021-10-14 PROCEDURE — 76825 ECHO EXAM OF FETAL HEART: CPT | Performed by: OBSTETRICS & GYNECOLOGY

## 2021-10-14 PROCEDURE — 99215 OFFICE O/P EST HI 40 MIN: CPT | Performed by: OBSTETRICS & GYNECOLOGY

## 2021-10-14 PROCEDURE — 76827 ECHO EXAM OF FETAL HEART: CPT | Performed by: OBSTETRICS & GYNECOLOGY

## 2021-10-14 PROCEDURE — 93325 DOPPLER ECHO COLOR FLOW MAPG: CPT | Performed by: OBSTETRICS & GYNECOLOGY

## 2021-10-14 NOTE — PROGRESS NOTES
Pt for fetal echo and carrier screen blood collection  Denies pregnancy complaints  States active fetus

## 2021-10-21 ENCOUNTER — ROUTINE PRENATAL (OUTPATIENT)
Dept: OBGYN CLINIC | Facility: CLINIC | Age: 41
End: 2021-10-21

## 2021-10-21 VITALS
WEIGHT: 280 LBS | BODY MASS INDEX: 51.53 KG/M2 | HEIGHT: 62 IN | SYSTOLIC BLOOD PRESSURE: 142 MMHG | DIASTOLIC BLOOD PRESSURE: 90 MMHG

## 2021-10-21 DIAGNOSIS — O09.529 SUPERVISION OF HIGH-RISK PREGNANCY OF ELDERLY MULTIGRAVIDA: Primary | ICD-10-CM

## 2021-10-21 PROCEDURE — 3080F DIAST BP >= 90 MM HG: CPT | Performed by: OBSTETRICS & GYNECOLOGY

## 2021-10-21 PROCEDURE — 3077F SYST BP >= 140 MM HG: CPT | Performed by: OBSTETRICS & GYNECOLOGY

## 2021-10-21 PROCEDURE — 3008F BODY MASS INDEX DOCD: CPT | Performed by: OBSTETRICS & GYNECOLOGY

## 2021-10-21 NOTE — PROGRESS NOTES
Her FBS are . Recommend change metformin 500 q AM and 1000 q HS. Repeat c/s scheduled for 1/12/2022.

## 2021-10-28 ENCOUNTER — TELEPHONE (OUTPATIENT)
Dept: PERINATAL CARE | Facility: HOSPITAL | Age: 41
End: 2021-10-28

## 2021-10-28 NOTE — TELEPHONE ENCOUNTER
Spoke with Orestes to report results of the carrier screening were negative for 14 out of 14 diseases including PKHD1.    Pt states understanding  Copy of results sent for scanning into pt record

## 2021-10-28 NOTE — PROGRESS NOTES
Gael Rojas  Dear Dr. Manfred Laws,     Thank you for requesting ultrasound evaluation and maternal fetal medicine consultation on your patient Inocencio Gutierrez.  As you are aware she is a 42year old female  w review.     OBESITY  Obesity during pregnancy is associated with numerous maternal and  risks which were reviewed.  See M note from  & 10/14/21 for detailed review.       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

## 2021-10-29 ENCOUNTER — TELEPHONE (OUTPATIENT)
Dept: OBGYN CLINIC | Facility: CLINIC | Age: 41
End: 2021-10-29

## 2021-11-04 ENCOUNTER — HOSPITAL ENCOUNTER (OUTPATIENT)
Dept: PERINATAL CARE | Facility: HOSPITAL | Age: 41
Discharge: HOME | End: 2021-11-04
Attending: OBSTETRICS & GYNECOLOGY
Payer: COMMERCIAL

## 2021-11-04 VITALS
DIASTOLIC BLOOD PRESSURE: 82 MMHG | BODY MASS INDEX: 51 KG/M2 | WEIGHT: 279 LBS | SYSTOLIC BLOOD PRESSURE: 133 MMHG | HEART RATE: 89 BPM

## 2021-11-04 DIAGNOSIS — O09.529 ANTEPARTUM MULTIGRAVIDA OF ADVANCED MATERNAL AGE: Primary | ICD-10-CM

## 2021-11-04 DIAGNOSIS — O10.019 PRE-EXISTING ESSENTIAL HYPERTENSION DURING PREGNANCY, ANTEPARTUM: Chronic | ICD-10-CM

## 2021-11-04 DIAGNOSIS — O24.119 PRE-EXISTING TYPE 2 DIABETES MELLITUS DURING PREGNANCY, ANTEPARTUM: ICD-10-CM

## 2021-11-04 DIAGNOSIS — O16.3 HYPERTENSION AFFECTING PREGNANCY IN THIRD TRIMESTER: Chronic | ICD-10-CM

## 2021-11-04 DIAGNOSIS — O28.0 ABNORMAL MSAFP (MATERNAL SERUM ALPHA-FETOPROTEIN), ELEVATED: ICD-10-CM

## 2021-11-04 DIAGNOSIS — E66.01 MORBID OBESITY WITH BMI OF 50.0-59.9, ADULT (HCC): ICD-10-CM

## 2021-11-04 DIAGNOSIS — O99.210 OBESITY IN PREGNANCY: ICD-10-CM

## 2021-11-04 DIAGNOSIS — O09.811 PREGNANCY RESULTING FROM IN VITRO FERTILIZATION IN FIRST TRIMESTER: Chronic | ICD-10-CM

## 2021-11-04 DIAGNOSIS — O22.30 DVT (DEEP VEIN THROMBOSIS) IN PREGNANCY: Chronic | ICD-10-CM

## 2021-11-04 DIAGNOSIS — O09.529 ANTEPARTUM MULTIGRAVIDA OF ADVANCED MATERNAL AGE: ICD-10-CM

## 2021-11-04 DIAGNOSIS — E11.9 TYPE 2 DIABETES MELLITUS WITHOUT COMPLICATION, WITHOUT LONG-TERM CURRENT USE OF INSULIN (HCC): ICD-10-CM

## 2021-11-04 PROCEDURE — 76816 OB US FOLLOW-UP PER FETUS: CPT | Performed by: OBSTETRICS & GYNECOLOGY

## 2021-11-04 PROCEDURE — 99214 OFFICE O/P EST MOD 30 MIN: CPT | Performed by: OBSTETRICS & GYNECOLOGY

## 2021-11-13 ENCOUNTER — PATIENT MESSAGE (OUTPATIENT)
Dept: OBGYN CLINIC | Facility: CLINIC | Age: 41
End: 2021-11-13

## 2021-11-15 NOTE — TELEPHONE ENCOUNTER
Called pt and swelling a bit better after taking BP meds. Pt denies arm pain. Per pt \"back to normal\" No other c/o and pt is scheduled to see [facility] 11/18/21. Advised to call back if any concerns or issues. Pt agrees. From: Aric Kowalski  To:  Faustino Youngblood

## 2021-11-18 ENCOUNTER — LAB ENCOUNTER (OUTPATIENT)
Dept: LAB | Facility: REFERENCE LAB | Age: 41
End: 2021-11-18
Attending: OBSTETRICS & GYNECOLOGY
Payer: COMMERCIAL

## 2021-11-18 ENCOUNTER — ROUTINE PRENATAL (OUTPATIENT)
Dept: OBGYN CLINIC | Facility: CLINIC | Age: 41
End: 2021-11-18

## 2021-11-18 VITALS
HEIGHT: 62 IN | DIASTOLIC BLOOD PRESSURE: 100 MMHG | BODY MASS INDEX: 52.08 KG/M2 | SYSTOLIC BLOOD PRESSURE: 162 MMHG | WEIGHT: 283 LBS

## 2021-11-18 DIAGNOSIS — O34.219 PREVIOUS CESAREAN DELIVERY AFFECTING PREGNANCY: Chronic | ICD-10-CM

## 2021-11-18 DIAGNOSIS — O10.019 PRE-EXISTING ESSENTIAL HYPERTENSION DURING PREGNANCY, ANTEPARTUM: Chronic | ICD-10-CM

## 2021-11-18 DIAGNOSIS — O22.30 DVT (DEEP VEIN THROMBOSIS) IN PREGNANCY: Chronic | ICD-10-CM

## 2021-11-18 DIAGNOSIS — O09.529 ANTEPARTUM MULTIGRAVIDA OF ADVANCED MATERNAL AGE: ICD-10-CM

## 2021-11-18 DIAGNOSIS — O16.3 HYPERTENSION AFFECTING PREGNANCY IN THIRD TRIMESTER: Chronic | ICD-10-CM

## 2021-11-18 DIAGNOSIS — O24.119 PRE-EXISTING TYPE 2 DIABETES MELLITUS DURING PREGNANCY, ANTEPARTUM: ICD-10-CM

## 2021-11-18 DIAGNOSIS — O09.529 SUPERVISION OF HIGH-RISK PREGNANCY OF ELDERLY MULTIGRAVIDA: Primary | ICD-10-CM

## 2021-11-18 PROCEDURE — 3080F DIAST BP >= 90 MM HG: CPT | Performed by: OBSTETRICS & GYNECOLOGY

## 2021-11-18 PROCEDURE — 36415 COLL VENOUS BLD VENIPUNCTURE: CPT

## 2021-11-18 PROCEDURE — 83036 HEMOGLOBIN GLYCOSYLATED A1C: CPT

## 2021-11-18 PROCEDURE — 85027 COMPLETE CBC AUTOMATED: CPT

## 2021-11-18 PROCEDURE — 90472 IMMUNIZATION ADMIN EACH ADD: CPT | Performed by: OBSTETRICS & GYNECOLOGY

## 2021-11-18 PROCEDURE — 90715 TDAP VACCINE 7 YRS/> IM: CPT | Performed by: OBSTETRICS & GYNECOLOGY

## 2021-11-18 PROCEDURE — 81002 URINALYSIS NONAUTO W/O SCOPE: CPT | Performed by: OBSTETRICS & GYNECOLOGY

## 2021-11-18 PROCEDURE — 3077F SYST BP >= 140 MM HG: CPT | Performed by: OBSTETRICS & GYNECOLOGY

## 2021-11-18 PROCEDURE — 90471 IMMUNIZATION ADMIN: CPT | Performed by: OBSTETRICS & GYNECOLOGY

## 2021-11-18 PROCEDURE — 90686 IIV4 VACC NO PRSV 0.5 ML IM: CPT | Performed by: OBSTETRICS & GYNECOLOGY

## 2021-11-18 PROCEDURE — 3008F BODY MASS INDEX DOCD: CPT | Performed by: OBSTETRICS & GYNECOLOGY

## 2021-11-18 RX ORDER — METFORMIN HYDROCHLORIDE 500 MG/1
TABLET, EXTENDED RELEASE ORAL
Qty: 180 TABLET | Refills: 0 | Status: SHIPPED | OUTPATIENT
Start: 2021-11-18 | End: 2021-12-16

## 2021-11-18 NOTE — PROGRESS NOTES
Outpatient Maternal-Fetal Medicine Follow-Up     Dear Dr. Tejeda,     Thank you for requesting ultrasound evaluation and maternal fetal medicine consultation on your patient Yareli Lynch.  As you are aware she is a 42year old female  w the complete US report.     DISCUSSION  During her visit we discussed and reviewed the following issues:  ADVANCED MATERNAL AGE  Discussed previously and reviewed.  See MFM note from 9/14/21 for detailed review.     OBESITY  Obesity during pregnancy is ass pregnancy Dr. Logan Sanchez did not choose to put her on insulin because of the endocrinologist concern that she would have low blood sugars during the day.     We discussed rapid acting insulin, intermediate acting insulin such as NPH of her Levemir and then l records, consultation and coordination of care.  Our discussion is summarized above. The approximate physician face-to-face time was 40 minutes.     Note to patient and family  The [facility] makes medical notes available to patients in the inte

## 2021-11-18 NOTE — PROGRESS NOTES
· Check glucoses 4x/day and review with OB weekly  · Monthly growth and biophysical profile ultrasound  · Weekly NST at 32 wks / twice weekly at  34  wks      · Hgb A1C q trimester  · Home BP monitoring     · Aspirin 81-120mg daily  · Delivery for well con

## 2021-11-19 ENCOUNTER — TELEPHONE (OUTPATIENT)
Dept: PERINATAL CARE | Facility: HOSPITAL | Age: 41
End: 2021-11-19

## 2021-11-19 ENCOUNTER — HOSPITAL ENCOUNTER (EMERGENCY)
Facility: HOSPITAL | Age: 41
Discharge: HOME | End: 2021-11-20
Attending: EMERGENCY MEDICINE
Payer: COMMERCIAL

## 2021-11-19 ENCOUNTER — APPOINTMENT (OUTPATIENT)
Dept: GENERAL RADIOLOGY | Facility: HOSPITAL | Age: 41
End: 2021-11-19
Attending: EMERGENCY MEDICINE
Payer: COMMERCIAL

## 2021-11-19 DIAGNOSIS — M79.672 LEFT FOOT PAIN: Primary | ICD-10-CM

## 2021-11-19 PROCEDURE — 73630 X-RAY EXAM OF FOOT: CPT | Performed by: EMERGENCY MEDICINE

## 2021-11-19 PROCEDURE — 99284 EMERGENCY DEPT VISIT MOD MDM: CPT

## 2021-11-19 RX ORDER — FLASH GLUCOSE SENSOR
1 KIT MISCELLANEOUS 4 TIMES DAILY
Qty: 1 EACH | Refills: 3 | Status: SHIPPED | OUTPATIENT
Start: 2021-11-19 | End: 2022-01-26

## 2021-11-19 NOTE — TELEPHONE ENCOUNTER
My Chart Blood Glucose Tracking information sent to pt per Dr Cardenas Common request. Phone message left for pt with instructions. Pt to contact MFM RN on Monday with any questions or concerns.

## 2021-11-19 NOTE — TELEPHONE ENCOUNTER
A refill request was received for:  Requested Prescriptions     Pending Prescriptions Disp Refills   • FREESTYLE WILLIAMS 15 DAY SENSOR Does not apply Misc [Pharmacy Med Name: Loida Pearl 14 DAY SENSOR]  1     Sig: TEST 4 TIMES A DAY     Last refill date:

## 2021-11-20 ENCOUNTER — APPOINTMENT (OUTPATIENT)
Dept: ULTRASOUND IMAGING | Facility: HOSPITAL | Age: 41
End: 2021-11-20
Attending: EMERGENCY MEDICINE
Payer: COMMERCIAL

## 2021-11-20 VITALS
OXYGEN SATURATION: 98 % | RESPIRATION RATE: 18 BRPM | BODY MASS INDEX: 52.08 KG/M2 | HEIGHT: 62 IN | TEMPERATURE: 98 F | DIASTOLIC BLOOD PRESSURE: 65 MMHG | HEART RATE: 90 BPM | SYSTOLIC BLOOD PRESSURE: 129 MMHG | WEIGHT: 283 LBS

## 2021-11-20 PROCEDURE — 93971 EXTREMITY STUDY: CPT | Performed by: EMERGENCY MEDICINE

## 2021-11-20 NOTE — ED INITIAL ASSESSMENT (HPI)
Pt states is 30wks gestation . States here for r/o DVT. Having pain to lt foot. States h/o DVT. Pt taked Lovenox BID. Was Sent to ER (not L&D) by OBGYN for DVT workup.

## 2021-11-20 NOTE — ED PROVIDER NOTES
Patient Seen in: [facility] AND [facility] Emergency Department      History   Patient presents with:  Swelling Edema    Stated Complaint: sent in by md, r/o dvt    Subjective:   HPI  48-year-old female at 27 weeks gestation here with complaints of left-sided fo noted in HPI. Constitutional and vital signs reviewed. All other systems reviewed and negative except as noted above.     Physical Exam     ED Triage Vitals [11/19/21 2038]   /89   Pulse 94   Resp 20   Temp 97.9 °F (36.6 °C)   Temp src Oral   Sp 11/20/2021      Left lower extremity venous duplex ultrasound      IMPRESSION:    No evidence of deep venous thrombosis in the left lower extremity veins. Results finalized at 1:29am ET. Sruthi Childers M.D.   This report has been electronically si sprain.                Disposition and Plan     Clinical Impression:  Left foot pain  (primary encounter diagnosis)     Disposition:  Discharge  11/20/2021 12:49 am    Follow-up:  Nadeem Lafleur, [address]  [facility] [address] [facility]

## 2021-11-23 ENCOUNTER — TELEPHONE (OUTPATIENT)
Dept: OBGYN CLINIC | Facility: CLINIC | Age: 41
End: 2021-11-23

## 2021-11-23 ENCOUNTER — TELEPHONE (OUTPATIENT)
Dept: PERINATAL CARE | Facility: HOSPITAL | Age: 41
End: 2021-11-23

## 2021-11-23 NOTE — TELEPHONE ENCOUNTER
Pt inhaled Bromin, pt is an  and inspected hot tub. Lungs are burning, pt was chocking. Advised pt to go to ER at [address] [place] [address] and ER will check pt out and then send her to triage in L&D if needed. Pt verbalized understanding and agrees with POC.

## 2021-11-23 NOTE — TELEPHONE ENCOUNTER
Pt [employer] swimming pool and inhaled gas - wants to know if she should come in to get checked out?

## 2021-11-29 ENCOUNTER — TELEPHONE (OUTPATIENT)
Dept: PERINATAL CARE | Facility: HOSPITAL | Age: 41
End: 2021-11-29

## 2021-11-29 NOTE — TELEPHONE ENCOUNTER
Spoke with Tracey Kiser regarding blood glucose tracking. Pt states she will try to put her blood sugars in this evening.

## 2021-11-30 ENCOUNTER — TELEPHONE (OUTPATIENT)
Dept: PERINATAL CARE | Facility: HOSPITAL | Age: 41
End: 2021-11-30

## 2021-12-01 ENCOUNTER — PATIENT MESSAGE (OUTPATIENT)
Dept: PERINATAL CARE | Facility: HOSPITAL | Age: 41
End: 2021-12-01

## 2021-12-02 ENCOUNTER — ROUTINE PRENATAL (OUTPATIENT)
Dept: OBGYN CLINIC | Facility: CLINIC | Age: 41
End: 2021-12-02

## 2021-12-02 ENCOUNTER — HOSPITAL ENCOUNTER (OUTPATIENT)
Dept: PERINATAL CARE | Facility: HOSPITAL | Age: 41
Discharge: HOME | End: 2021-12-02
Attending: OBSTETRICS & GYNECOLOGY
Payer: COMMERCIAL

## 2021-12-02 VITALS
DIASTOLIC BLOOD PRESSURE: 82 MMHG | WEIGHT: 282.69 LBS | SYSTOLIC BLOOD PRESSURE: 130 MMHG | BODY MASS INDEX: 52.02 KG/M2 | HEIGHT: 62 IN

## 2021-12-02 VITALS — DIASTOLIC BLOOD PRESSURE: 80 MMHG | BODY MASS INDEX: 51 KG/M2 | WEIGHT: 280 LBS | SYSTOLIC BLOOD PRESSURE: 150 MMHG

## 2021-12-02 DIAGNOSIS — E11.9 TYPE 2 DIABETES MELLITUS WITHOUT COMPLICATION, WITHOUT LONG-TERM CURRENT USE OF INSULIN (HCC): ICD-10-CM

## 2021-12-02 DIAGNOSIS — O34.219 PREVIOUS CESAREAN DELIVERY AFFECTING PREGNANCY: Chronic | ICD-10-CM

## 2021-12-02 DIAGNOSIS — O09.811 PREGNANCY RESULTING FROM IN VITRO FERTILIZATION IN FIRST TRIMESTER: Chronic | ICD-10-CM

## 2021-12-02 DIAGNOSIS — O99.210 OBESITY IN PREGNANCY: ICD-10-CM

## 2021-12-02 DIAGNOSIS — O09.529 SUPERVISION OF HIGH-RISK PREGNANCY OF ELDERLY MULTIGRAVIDA: Primary | Chronic | ICD-10-CM

## 2021-12-02 DIAGNOSIS — O16.3 HYPERTENSION AFFECTING PREGNANCY IN THIRD TRIMESTER: Chronic | ICD-10-CM

## 2021-12-02 DIAGNOSIS — O24.119 PRE-EXISTING TYPE 2 DIABETES MELLITUS DURING PREGNANCY, ANTEPARTUM: Chronic | ICD-10-CM

## 2021-12-02 DIAGNOSIS — O09.529 SUPERVISION OF HIGH-RISK PREGNANCY OF ELDERLY MULTIGRAVIDA: Chronic | ICD-10-CM

## 2021-12-02 DIAGNOSIS — Z36.9 UNSPECIFIED ANTENATAL SCREENING: ICD-10-CM

## 2021-12-02 PROCEDURE — 76819 FETAL BIOPHYS PROFIL W/O NST: CPT | Performed by: OBSTETRICS & GYNECOLOGY

## 2021-12-02 PROCEDURE — 99215 OFFICE O/P EST HI 40 MIN: CPT | Performed by: OBSTETRICS & GYNECOLOGY

## 2021-12-02 PROCEDURE — 81002 URINALYSIS NONAUTO W/O SCOPE: CPT | Performed by: OBSTETRICS & GYNECOLOGY

## 2021-12-02 PROCEDURE — 3079F DIAST BP 80-89 MM HG: CPT | Performed by: OBSTETRICS & GYNECOLOGY

## 2021-12-02 PROCEDURE — 76816 OB US FOLLOW-UP PER FETUS: CPT | Performed by: OBSTETRICS & GYNECOLOGY

## 2021-12-02 PROCEDURE — 3075F SYST BP GE 130 - 139MM HG: CPT | Performed by: OBSTETRICS & GYNECOLOGY

## 2021-12-02 PROCEDURE — 3008F BODY MASS INDEX DOCD: CPT | Performed by: OBSTETRICS & GYNECOLOGY

## 2021-12-02 NOTE — PROGRESS NOTES
Fetal NST today reactive. Had MFM consult and USN today. ROSEMARY is managing her BS. She plans to Fu with them.

## 2021-12-07 ENCOUNTER — TELEPHONE (OUTPATIENT)
Dept: PERINATAL CARE | Facility: HOSPITAL | Age: 41
End: 2021-12-07

## 2021-12-07 DIAGNOSIS — O24.419 GDM, CLASS A2: Primary | ICD-10-CM

## 2021-12-07 RX ORDER — INSULIN DETEMIR 100 [IU]/ML
8 INJECTION, SOLUTION SUBCUTANEOUS NIGHTLY
Qty: 5 EACH | Refills: 3 | Status: SHIPPED | OUTPATIENT
Start: 2021-12-07 | End: 2021-12-26

## 2021-12-07 NOTE — TELEPHONE ENCOUNTER
32w6d  Received BS log for date range 12/1-12/6     Low  High Out of Range   Fasting Blood Sugar 103 111 5/5   Post Breakfast 96 131 1/4   Post Lunch 95 140 1/6   Post Dinner 102 174 4/6     Patient is currently taking     Metformin 500 mg in the morning a

## 2021-12-07 NOTE — TELEPHONE ENCOUNTER
New regimen:    Metformin 500 mg in the morning and 1000 mg at night  Add Levemir 8 u subcutaneous at bedtime - please call patient to inform her that RX sent to pharmacy    Charlene Evans. Johnnie Floewr M.D.   Maternal-Fetal Medicine

## 2021-12-08 ENCOUNTER — ROUTINE PRENATAL (OUTPATIENT)
Dept: OBGYN CLINIC | Facility: CLINIC | Age: 41
End: 2021-12-08

## 2021-12-08 ENCOUNTER — TELEPHONE (OUTPATIENT)
Dept: PERINATAL CARE | Facility: HOSPITAL | Age: 41
End: 2021-12-08

## 2021-12-08 VITALS — DIASTOLIC BLOOD PRESSURE: 82 MMHG | WEIGHT: 281 LBS | SYSTOLIC BLOOD PRESSURE: 124 MMHG | BODY MASS INDEX: 51 KG/M2

## 2021-12-08 DIAGNOSIS — O10.019 PRE-EXISTING ESSENTIAL HYPERTENSION DURING PREGNANCY, ANTEPARTUM: ICD-10-CM

## 2021-12-08 DIAGNOSIS — O24.119 PRE-EXISTING TYPE 2 DIABETES MELLITUS DURING PREGNANCY, ANTEPARTUM: ICD-10-CM

## 2021-12-08 DIAGNOSIS — O09.529 SUPERVISION OF HIGH-RISK PREGNANCY OF ELDERLY MULTIGRAVIDA: Primary | ICD-10-CM

## 2021-12-08 PROCEDURE — 81002 URINALYSIS NONAUTO W/O SCOPE: CPT | Performed by: OBSTETRICS & GYNECOLOGY

## 2021-12-08 PROCEDURE — 3079F DIAST BP 80-89 MM HG: CPT | Performed by: OBSTETRICS & GYNECOLOGY

## 2021-12-08 PROCEDURE — 3074F SYST BP LT 130 MM HG: CPT | Performed by: OBSTETRICS & GYNECOLOGY

## 2021-12-08 NOTE — TELEPHONE ENCOUNTER
Received my chart message from pt regarding recommendation to add Levemir 8 units at bedtime. Pt states she has not been taking her Metformin since last Thursday (11/2) due to a stomach virus. She has resumed her Metformin as of 12/7.  Reported to Dr Chepe Puente

## 2021-12-09 ENCOUNTER — TELEPHONE (OUTPATIENT)
Dept: OBGYN CLINIC | Facility: CLINIC | Age: 41
End: 2021-12-09

## 2021-12-09 NOTE — PROGRESS NOTES
Has been doing accuchecks which are still elevated. MFM has written for insulin which she plans to start this week. NST reactive.

## 2021-12-09 NOTE — TELEPHONE ENCOUNTER
LM for patient regarding referral to ophthalmology from Dr. Kallie Silva. Referral was canceled saying PCP needs to originate referral and not the specialists.

## 2021-12-10 ENCOUNTER — TELEPHONE (OUTPATIENT)
Dept: PERINATAL CARE | Facility: HOSPITAL | Age: 41
End: 2021-12-10

## 2021-12-16 ENCOUNTER — TELEPHONE (OUTPATIENT)
Dept: PERINATAL CARE | Facility: HOSPITAL | Age: 41
End: 2021-12-16

## 2021-12-16 ENCOUNTER — ROUTINE PRENATAL (OUTPATIENT)
Dept: OBGYN CLINIC | Facility: CLINIC | Age: 41
End: 2021-12-16

## 2021-12-16 VITALS
WEIGHT: 280.19 LBS | SYSTOLIC BLOOD PRESSURE: 124 MMHG | HEIGHT: 62 IN | DIASTOLIC BLOOD PRESSURE: 82 MMHG | BODY MASS INDEX: 51.56 KG/M2

## 2021-12-16 DIAGNOSIS — O09.529 SUPERVISION OF HIGH-RISK PREGNANCY OF ELDERLY MULTIGRAVIDA: Primary | ICD-10-CM

## 2021-12-16 DIAGNOSIS — Z36.9 UNSPECIFIED ANTENATAL SCREENING: ICD-10-CM

## 2021-12-16 PROCEDURE — 3079F DIAST BP 80-89 MM HG: CPT | Performed by: OBSTETRICS & GYNECOLOGY

## 2021-12-16 PROCEDURE — 81002 URINALYSIS NONAUTO W/O SCOPE: CPT | Performed by: OBSTETRICS & GYNECOLOGY

## 2021-12-16 PROCEDURE — 3008F BODY MASS INDEX DOCD: CPT | Performed by: OBSTETRICS & GYNECOLOGY

## 2021-12-16 PROCEDURE — 3074F SYST BP LT 130 MM HG: CPT | Performed by: OBSTETRICS & GYNECOLOGY

## 2021-12-16 RX ORDER — METFORMIN HYDROCHLORIDE 500 MG/1
TABLET, EXTENDED RELEASE ORAL
Qty: 90 TABLET | Refills: 1 | Status: SHIPPED | OUTPATIENT
Start: 2021-12-16 | End: 2021-12-26

## 2021-12-17 ENCOUNTER — TELEPHONE (OUTPATIENT)
Dept: PERINATAL CARE | Facility: HOSPITAL | Age: 41
End: 2021-12-17

## 2021-12-20 ENCOUNTER — TELEPHONE (OUTPATIENT)
Dept: OBGYN CLINIC | Facility: CLINIC | Age: 41
End: 2021-12-20

## 2021-12-20 NOTE — TELEPHONE ENCOUNTER
Spoke with patient and informed her that her [facility] paperwork should be completed tomorrow and faxed. Asked patient to supply fax number for paperwork. Patient will call back.

## 2021-12-21 ENCOUNTER — HOSPITAL ENCOUNTER (INPATIENT)
Facility: HOSPITAL | Age: 41
LOS: 5 days | Discharge: HOME | End: 2021-12-26
Attending: OBSTETRICS & GYNECOLOGY | Admitting: OBSTETRICS & GYNECOLOGY
Payer: COMMERCIAL

## 2021-12-21 ENCOUNTER — ROUTINE PRENATAL (OUTPATIENT)
Dept: OBGYN CLINIC | Facility: CLINIC | Age: 41
End: 2021-12-21

## 2021-12-21 VITALS
BODY MASS INDEX: 51.71 KG/M2 | HEIGHT: 62 IN | WEIGHT: 281 LBS | DIASTOLIC BLOOD PRESSURE: 100 MMHG | SYSTOLIC BLOOD PRESSURE: 148 MMHG

## 2021-12-21 DIAGNOSIS — Z36.9 UNSPECIFIED ANTENATAL SCREENING: Primary | ICD-10-CM

## 2021-12-21 DIAGNOSIS — O09.529 SUPERVISION OF HIGH-RISK PREGNANCY OF ELDERLY MULTIGRAVIDA: ICD-10-CM

## 2021-12-21 PROBLEM — Z34.90 PREGNANT (HCC): Status: ACTIVE | Noted: 2021-12-21

## 2021-12-21 PROBLEM — Z34.90 PREGNANCY: Status: ACTIVE | Noted: 2021-12-21

## 2021-12-21 PROBLEM — Z34.90 PREGNANCY (HCC): Status: ACTIVE | Noted: 2021-12-21

## 2021-12-21 PROBLEM — Z34.90 PREGNANT: Status: ACTIVE | Noted: 2021-12-21

## 2021-12-21 LAB
ALBUMIN SERPL-MCNC: 2.4 G/DL (ref 3.4–5)
ALBUMIN/GLOB SERPL: 0.6 {RATIO} (ref 1–2)
ALP LIVER SERPL-CCNC: 114 U/L
ALT SERPL-CCNC: 27 U/L
ANION GAP SERPL CALC-SCNC: 9 MMOL/L (ref 0–18)
AST SERPL-CCNC: 19 U/L (ref 15–37)
BASOPHILS # BLD AUTO: 0.03 X10(3) UL (ref 0–0.2)
BASOPHILS NFR BLD AUTO: 0.3 %
BILIRUB SERPL-MCNC: 0.2 MG/DL (ref 0.1–2)
BUN BLD-MCNC: 12 MG/DL (ref 7–18)
BUN/CREAT SERPL: 19 (ref 10–20)
CALCIUM BLD-MCNC: 8.6 MG/DL (ref 8.5–10.1)
CHLORIDE SERPL-SCNC: 107 MMOL/L (ref 98–112)
CO2 SERPL-SCNC: 21 MMOL/L (ref 21–32)
CREAT BLD-MCNC: 0.63 MG/DL
CREAT UR-SCNC: 285 MG/DL
DEPRECATED RDW RBC AUTO: 43.7 FL (ref 35.1–46.3)
EOSINOPHIL # BLD AUTO: 0.06 X10(3) UL (ref 0–0.7)
EOSINOPHIL NFR BLD AUTO: 0.6 %
ERYTHROCYTE [DISTWIDTH] IN BLOOD BY AUTOMATED COUNT: 14.3 % (ref 11–15)
GLOBULIN PLAS-MCNC: 4.2 G/DL (ref 2.8–4.4)
GLUCOSE BLD-MCNC: 129 MG/DL (ref 70–99)
GLUCOSE BLDC GLUCOMTR-MCNC: 121 MG/DL (ref 70–99)
HCT VFR BLD AUTO: 31.6 %
HGB BLD-MCNC: 10.2 G/DL
IMM GRANULOCYTES # BLD AUTO: 0.07 X10(3) UL (ref 0–1)
IMM GRANULOCYTES NFR BLD: 0.7 %
LYMPHOCYTES # BLD AUTO: 1.57 X10(3) UL (ref 1–4)
LYMPHOCYTES NFR BLD AUTO: 16.6 %
MCH RBC QN AUTO: 27.3 PG (ref 26–34)
MCHC RBC AUTO-ENTMCNC: 32.3 G/DL (ref 31–37)
MCV RBC AUTO: 84.7 FL
MONOCYTES # BLD AUTO: 0.61 X10(3) UL (ref 0.1–1)
MONOCYTES NFR BLD AUTO: 6.4 %
NEUTROPHILS # BLD AUTO: 7.13 X10 (3) UL (ref 1.5–7.7)
NEUTROPHILS # BLD AUTO: 7.13 X10(3) UL (ref 1.5–7.7)
NEUTROPHILS NFR BLD AUTO: 75.4 %
OSMOLALITY SERPL CALC.SUM OF ELEC: 285 MOSM/KG (ref 275–295)
PLATELET # BLD AUTO: 177 10(3)UL (ref 150–450)
POTASSIUM SERPL-SCNC: 3.7 MMOL/L (ref 3.5–5.1)
PROT SERPL-MCNC: 6.6 G/DL (ref 6.4–8.2)
PROT UR-MCNC: 74.8 MG/DL
PROT/CREAT UR-RTO: 0.26
RBC # BLD AUTO: 3.73 X10(6)UL
SODIUM SERPL-SCNC: 137 MMOL/L (ref 136–145)
WBC # BLD AUTO: 9.5 X10(3) UL (ref 4–11)

## 2021-12-21 PROCEDURE — 3008F BODY MASS INDEX DOCD: CPT | Performed by: OBSTETRICS & GYNECOLOGY

## 2021-12-21 PROCEDURE — 59426 ANTEPARTUM CARE ONLY: CPT | Performed by: OBSTETRICS & GYNECOLOGY

## 2021-12-21 PROCEDURE — 59025 FETAL NON-STRESS TEST: CPT | Performed by: OBSTETRICS & GYNECOLOGY

## 2021-12-21 PROCEDURE — 81002 URINALYSIS NONAUTO W/O SCOPE: CPT | Performed by: OBSTETRICS & GYNECOLOGY

## 2021-12-21 PROCEDURE — 3077F SYST BP >= 140 MM HG: CPT | Performed by: OBSTETRICS & GYNECOLOGY

## 2021-12-21 PROCEDURE — 3080F DIAST BP >= 90 MM HG: CPT | Performed by: OBSTETRICS & GYNECOLOGY

## 2021-12-21 RX ORDER — DOCUSATE SODIUM 100 MG/1
100 CAPSULE, LIQUID FILLED ORAL 2 TIMES DAILY
Status: DISCONTINUED | OUTPATIENT
Start: 2021-12-22 | End: 2021-12-26

## 2021-12-21 RX ORDER — LABETALOL HYDROCHLORIDE 5 MG/ML
80 INJECTION, SOLUTION INTRAVENOUS ONCE AS NEEDED
Status: DISCONTINUED | OUTPATIENT
Start: 2021-12-21 | End: 2021-12-26

## 2021-12-21 RX ORDER — LABETALOL 200 MG/1
400 TABLET, FILM COATED ORAL EVERY 12 HOURS SCHEDULED
Status: DISCONTINUED | OUTPATIENT
Start: 2021-12-21 | End: 2021-12-26

## 2021-12-21 RX ORDER — LABETALOL HYDROCHLORIDE 5 MG/ML
40 INJECTION, SOLUTION INTRAVENOUS ONCE AS NEEDED
Status: COMPLETED | OUTPATIENT
Start: 2021-12-21 | End: 2021-12-21

## 2021-12-21 RX ORDER — SODIUM CHLORIDE, SODIUM LACTATE, POTASSIUM CHLORIDE, CALCIUM CHLORIDE 600; 310; 30; 20 MG/100ML; MG/100ML; MG/100ML; MG/100ML
INJECTION, SOLUTION INTRAVENOUS CONTINUOUS
Status: DISCONTINUED | OUTPATIENT
Start: 2021-12-21 | End: 2021-12-25

## 2021-12-21 RX ORDER — ENOXAPARIN SODIUM 150 MG/ML
120 INJECTION SUBCUTANEOUS DAILY
Status: DISCONTINUED | OUTPATIENT
Start: 2021-12-22 | End: 2021-12-21

## 2021-12-21 RX ORDER — LABETALOL HYDROCHLORIDE 5 MG/ML
20 INJECTION, SOLUTION INTRAVENOUS ONCE AS NEEDED
Status: COMPLETED | OUTPATIENT
Start: 2021-12-21 | End: 2021-12-22

## 2021-12-21 RX ORDER — LABETALOL HYDROCHLORIDE 5 MG/ML
INJECTION, SOLUTION INTRAVENOUS
Status: COMPLETED
Start: 2021-12-21 | End: 2021-12-21

## 2021-12-21 RX ORDER — ACETAMINOPHEN 500 MG
500 TABLET ORAL EVERY 6 HOURS PRN
Status: DISCONTINUED | OUTPATIENT
Start: 2021-12-21 | End: 2021-12-26

## 2021-12-21 RX ORDER — CALCIUM CARBONATE 200(500)MG
1000 TABLET,CHEWABLE ORAL
Status: DISCONTINUED | OUTPATIENT
Start: 2021-12-21 | End: 2021-12-26

## 2021-12-21 RX ORDER — ENOXAPARIN SODIUM 150 MG/ML
120 INJECTION SUBCUTANEOUS EVERY 12 HOURS SCHEDULED
Status: DISCONTINUED | OUTPATIENT
Start: 2021-12-21 | End: 2021-12-22

## 2021-12-21 RX ORDER — ACETAMINOPHEN 500 MG
1000 TABLET ORAL EVERY 6 HOURS PRN
Status: DISCONTINUED | OUTPATIENT
Start: 2021-12-21 | End: 2021-12-26

## 2021-12-21 RX ORDER — CHOLECALCIFEROL (VITAMIN D3) 25 MCG
1 TABLET,CHEWABLE ORAL DAILY
Status: DISCONTINUED | OUTPATIENT
Start: 2021-12-22 | End: 2021-12-26

## 2021-12-21 RX ORDER — BETAMETHASONE SODIUM PHOSPHATE AND BETAMETHASONE ACETATE 3; 3 MG/ML; MG/ML
12 INJECTION, SUSPENSION INTRA-ARTICULAR; INTRALESIONAL; INTRAMUSCULAR; SOFT TISSUE EVERY 24 HOURS
Status: DISCONTINUED | OUTPATIENT
Start: 2021-12-21 | End: 2021-12-22

## 2021-12-21 RX ORDER — HYDRALAZINE HYDROCHLORIDE 20 MG/ML
10 INJECTION INTRAMUSCULAR; INTRAVENOUS ONCE AS NEEDED
Status: COMPLETED | OUTPATIENT
Start: 2021-12-22 | End: 2021-12-22

## 2021-12-21 RX ORDER — ZOLPIDEM TARTRATE 5 MG/1
5 TABLET ORAL NIGHTLY PRN
Status: DISCONTINUED | OUTPATIENT
Start: 2021-12-21 | End: 2021-12-26

## 2021-12-21 NOTE — PROGRESS NOTES
No HA, visual changes or RUQ pain. Feels well. BS better controlled on insulin. Not doing home bps. NST reactive, but bp elevated to 166/103 on repeat. To L&D to r/o preeclampsia.

## 2021-12-22 ENCOUNTER — TELEPHONE (OUTPATIENT)
Dept: PERINATAL CARE | Facility: HOSPITAL | Age: 41
End: 2021-12-22

## 2021-12-22 ENCOUNTER — ANESTHESIA (OUTPATIENT)
Dept: OBGYN UNIT | Facility: HOSPITAL | Age: 41
End: 2021-12-22
Payer: COMMERCIAL

## 2021-12-22 ENCOUNTER — ANESTHESIA EVENT (OUTPATIENT)
Dept: OBGYN UNIT | Facility: HOSPITAL | Age: 41
End: 2021-12-22
Payer: COMMERCIAL

## 2021-12-22 LAB
ALBUMIN SERPL-MCNC: 2.5 G/DL (ref 3.4–5)
ALBUMIN/GLOB SERPL: 0.7 {RATIO} (ref 1–2)
ALP LIVER SERPL-CCNC: 120 U/L
ALT SERPL-CCNC: 23 U/L
ANION GAP SERPL CALC-SCNC: 11 MMOL/L (ref 0–18)
ANTIBODY SCREEN: NEGATIVE
AST SERPL-CCNC: 15 U/L (ref 15–37)
BASOPHILS # BLD AUTO: 0.03 X10(3) UL (ref 0–0.2)
BASOPHILS NFR BLD AUTO: 0.3 %
BILIRUB SERPL-MCNC: 0.4 MG/DL (ref 0.1–2)
BUN BLD-MCNC: 9 MG/DL (ref 7–18)
BUN/CREAT SERPL: 16.1 (ref 10–20)
CALCIUM BLD-MCNC: 9.2 MG/DL (ref 8.5–10.1)
CHLORIDE SERPL-SCNC: 104 MMOL/L (ref 98–112)
CO2 SERPL-SCNC: 20 MMOL/L (ref 21–32)
CREAT BLD-MCNC: 0.56 MG/DL
DEPRECATED RDW RBC AUTO: 43.2 FL (ref 35.1–46.3)
DEPRECATED RDW RBC AUTO: 45.4 FL (ref 35.1–46.3)
EOSINOPHIL # BLD AUTO: 0 X10(3) UL (ref 0–0.7)
EOSINOPHIL NFR BLD AUTO: 0 %
ERYTHROCYTE [DISTWIDTH] IN BLOOD BY AUTOMATED COUNT: 14.2 % (ref 11–15)
ERYTHROCYTE [DISTWIDTH] IN BLOOD BY AUTOMATED COUNT: 14.6 % (ref 11–15)
GLOBULIN PLAS-MCNC: 3.7 G/DL (ref 2.8–4.4)
GLUCOSE BLD-MCNC: 92 MG/DL (ref 70–99)
GLUCOSE BLDC GLUCOMTR-MCNC: 102 MG/DL (ref 70–99)
GLUCOSE BLDC GLUCOMTR-MCNC: 152 MG/DL (ref 70–99)
HCT VFR BLD AUTO: 28.6 %
HCT VFR BLD AUTO: 31.5 %
HGB BLD-MCNC: 10.1 G/DL
HGB BLD-MCNC: 8.9 G/DL
HIV 1+2 AB+HIV1 P24 AG SERPL QL IA: NONREACTIVE
IMM GRANULOCYTES # BLD AUTO: 0.12 X10(3) UL (ref 0–1)
IMM GRANULOCYTES NFR BLD: 1 %
LDH SERPL L TO P-CCNC: 175 U/L
LYMPHOCYTES # BLD AUTO: 1 X10(3) UL (ref 1–4)
LYMPHOCYTES NFR BLD AUTO: 8.6 %
MCH RBC QN AUTO: 26.8 PG (ref 26–34)
MCH RBC QN AUTO: 27.1 PG (ref 26–34)
MCHC RBC AUTO-ENTMCNC: 31.1 G/DL (ref 31–37)
MCHC RBC AUTO-ENTMCNC: 32.1 G/DL (ref 31–37)
MCV RBC AUTO: 84.5 FL
MCV RBC AUTO: 86.1 FL
MONOCYTES # BLD AUTO: 0.53 X10(3) UL (ref 0.1–1)
MONOCYTES NFR BLD AUTO: 4.6 %
NEUTROPHILS # BLD AUTO: 9.92 X10 (3) UL (ref 1.5–7.7)
NEUTROPHILS # BLD AUTO: 9.92 X10(3) UL (ref 1.5–7.7)
NEUTROPHILS NFR BLD AUTO: 85.5 %
OSMOLALITY SERPL CALC.SUM OF ELEC: 278 MOSM/KG (ref 275–295)
PLATELET # BLD AUTO: 173 10(3)UL (ref 150–450)
PLATELET # BLD AUTO: 185 10(3)UL (ref 150–450)
POTASSIUM SERPL-SCNC: 4.4 MMOL/L (ref 3.5–5.1)
PROT SERPL-MCNC: 6.2 G/DL (ref 6.4–8.2)
RBC # BLD AUTO: 3.32 X10(6)UL
RBC # BLD AUTO: 3.73 X10(6)UL
RH BLOOD TYPE: POSITIVE
SARS-COV-2 RNA RESP QL NAA+PROBE: NOT DETECTED
SODIUM SERPL-SCNC: 135 MMOL/L (ref 136–145)
URATE SERPL-MCNC: 4.7 MG/DL
WBC # BLD AUTO: 11.6 X10(3) UL (ref 4–11)
WBC # BLD AUTO: 17.7 X10(3) UL (ref 4–11)

## 2021-12-22 PROCEDURE — P9045 ALBUMIN (HUMAN), 5%, 250 ML: HCPCS | Performed by: ANESTHESIOLOGY

## 2021-12-22 PROCEDURE — 59515 CESAREAN DELIVERY: CPT | Performed by: OBSTETRICS & GYNECOLOGY

## 2021-12-22 RX ORDER — KETOROLAC TROMETHAMINE 30 MG/ML
30 INJECTION, SOLUTION INTRAMUSCULAR; INTRAVENOUS EVERY 6 HOURS
Status: COMPLETED | OUTPATIENT
Start: 2021-12-22 | End: 2021-12-23

## 2021-12-22 RX ORDER — HYDROMORPHONE HYDROCHLORIDE 1 MG/ML
0.4 INJECTION, SOLUTION INTRAMUSCULAR; INTRAVENOUS; SUBCUTANEOUS EVERY 5 MIN PRN
Status: DISCONTINUED | OUTPATIENT
Start: 2021-12-22 | End: 2021-12-26

## 2021-12-22 RX ORDER — PROCHLORPERAZINE EDISYLATE 5 MG/ML
5 INJECTION INTRAMUSCULAR; INTRAVENOUS ONCE AS NEEDED
Status: ACTIVE | OUTPATIENT
Start: 2021-12-22 | End: 2021-12-22

## 2021-12-22 RX ORDER — METOCLOPRAMIDE HYDROCHLORIDE 5 MG/ML
INJECTION INTRAMUSCULAR; INTRAVENOUS
Status: COMPLETED
Start: 2021-12-22 | End: 2021-12-22

## 2021-12-22 RX ORDER — LABETALOL 200 MG/1
200 TABLET, FILM COATED ORAL ONCE
Status: DISCONTINUED | OUTPATIENT
Start: 2021-12-22 | End: 2021-12-22

## 2021-12-22 RX ORDER — LIDOCAINE HYDROCHLORIDE 10 MG/ML
INJECTION, SOLUTION EPIDURAL; INFILTRATION; INTRACAUDAL; PERINEURAL AS NEEDED
Status: DISCONTINUED | OUTPATIENT
Start: 2021-12-22 | End: 2021-12-23 | Stop reason: SURG

## 2021-12-22 RX ORDER — FAMOTIDINE 10 MG/ML
INJECTION, SOLUTION INTRAVENOUS
Status: COMPLETED
Start: 2021-12-22 | End: 2021-12-22

## 2021-12-22 RX ORDER — ENOXAPARIN SODIUM 100 MG/ML
40 INJECTION SUBCUTANEOUS DAILY
Status: DISCONTINUED | OUTPATIENT
Start: 2021-12-23 | End: 2021-12-23

## 2021-12-22 RX ORDER — NIFEDIPINE 10 MG/1
10 CAPSULE ORAL ONCE AS NEEDED
Status: COMPLETED | OUTPATIENT
Start: 2021-12-22 | End: 2021-12-22

## 2021-12-22 RX ORDER — TRANEXAMIC ACID 10 MG/ML
INJECTION, SOLUTION INTRAVENOUS AS NEEDED
Status: DISCONTINUED | OUTPATIENT
Start: 2021-12-22 | End: 2021-12-23 | Stop reason: SURG

## 2021-12-22 RX ORDER — NIFEDIPINE 30 MG/1
30 TABLET, EXTENDED RELEASE ORAL DAILY
Status: DISCONTINUED | OUTPATIENT
Start: 2021-12-22 | End: 2021-12-24

## 2021-12-22 RX ORDER — ALBUMIN, HUMAN INJ 5% 5 %
SOLUTION INTRAVENOUS CONTINUOUS PRN
Status: DISCONTINUED | OUTPATIENT
Start: 2021-12-22 | End: 2021-12-23 | Stop reason: SURG

## 2021-12-22 RX ORDER — GABAPENTIN 300 MG/1
300 CAPSULE ORAL EVERY 8 HOURS PRN
Status: DISCONTINUED | OUTPATIENT
Start: 2021-12-22 | End: 2021-12-26

## 2021-12-22 RX ORDER — SODIUM CHLORIDE, SODIUM LACTATE, POTASSIUM CHLORIDE, CALCIUM CHLORIDE 600; 310; 30; 20 MG/100ML; MG/100ML; MG/100ML; MG/100ML
INJECTION, SOLUTION INTRAVENOUS CONTINUOUS
Status: DISCONTINUED | OUTPATIENT
Start: 2021-12-22 | End: 2021-12-25

## 2021-12-22 RX ORDER — SODIUM CHLORIDE, SODIUM LACTATE, POTASSIUM CHLORIDE, CALCIUM CHLORIDE 600; 310; 30; 20 MG/100ML; MG/100ML; MG/100ML; MG/100ML
INJECTION, SOLUTION INTRAVENOUS ONCE
Status: COMPLETED | OUTPATIENT
Start: 2021-12-22 | End: 2021-12-22

## 2021-12-22 RX ORDER — HYDROMORPHONE HYDROCHLORIDE 1 MG/ML
0.2 INJECTION, SOLUTION INTRAMUSCULAR; INTRAVENOUS; SUBCUTANEOUS EVERY 5 MIN PRN
Status: DISCONTINUED | OUTPATIENT
Start: 2021-12-22 | End: 2021-12-26

## 2021-12-22 RX ORDER — LABETALOL HYDROCHLORIDE 5 MG/ML
80 INJECTION, SOLUTION INTRAVENOUS ONCE AS NEEDED
Status: COMPLETED | OUTPATIENT
Start: 2021-12-22 | End: 2021-12-22

## 2021-12-22 RX ORDER — CALCIUM GLUCONATE 94 MG/ML
1 INJECTION, SOLUTION INTRAVENOUS ONCE AS NEEDED
Status: DISCONTINUED | OUTPATIENT
Start: 2021-12-22 | End: 2021-12-26

## 2021-12-22 RX ORDER — DEXTROSE, SODIUM CHLORIDE, SODIUM LACTATE, POTASSIUM CHLORIDE, AND CALCIUM CHLORIDE 5; .6; .31; .03; .02 G/100ML; G/100ML; G/100ML; G/100ML; G/100ML
INJECTION, SOLUTION INTRAVENOUS CONTINUOUS
Status: DISCONTINUED | OUTPATIENT
Start: 2021-12-22 | End: 2021-12-25

## 2021-12-22 RX ORDER — MEPERIDINE HYDROCHLORIDE 25 MG/ML
12.5 INJECTION INTRAMUSCULAR; INTRAVENOUS; SUBCUTANEOUS AS NEEDED
Status: DISCONTINUED | OUTPATIENT
Start: 2021-12-22 | End: 2021-12-26

## 2021-12-22 RX ORDER — CARBOPROST TROMETHAMINE 250 UG/ML
INJECTION, SOLUTION INTRAMUSCULAR
Status: DISPENSED
Start: 2021-12-22 | End: 2021-12-23

## 2021-12-22 RX ORDER — HYDRALAZINE HYDROCHLORIDE 20 MG/ML
10 INJECTION INTRAMUSCULAR; INTRAVENOUS ONCE
Status: COMPLETED | OUTPATIENT
Start: 2021-12-22 | End: 2021-12-22

## 2021-12-22 RX ORDER — LABETALOL HYDROCHLORIDE 5 MG/ML
40 INJECTION, SOLUTION INTRAVENOUS ONCE AS NEEDED
Status: COMPLETED | OUTPATIENT
Start: 2021-12-22 | End: 2021-12-22

## 2021-12-22 RX ORDER — HYDRALAZINE HYDROCHLORIDE 20 MG/ML
10 INJECTION INTRAMUSCULAR; INTRAVENOUS ONCE AS NEEDED
Status: COMPLETED | OUTPATIENT
Start: 2021-12-22 | End: 2021-12-22

## 2021-12-22 RX ORDER — SODIUM CHLORIDE, SODIUM LACTATE, POTASSIUM CHLORIDE, CALCIUM CHLORIDE 600; 310; 30; 20 MG/100ML; MG/100ML; MG/100ML; MG/100ML
INJECTION, SOLUTION INTRAVENOUS CONTINUOUS PRN
Status: DISCONTINUED | OUTPATIENT
Start: 2021-12-22 | End: 2021-12-23 | Stop reason: SURG

## 2021-12-22 RX ORDER — CARBOPROST TROMETHAMINE 250 UG/ML
INJECTION, SOLUTION INTRAMUSCULAR AS NEEDED
Status: DISCONTINUED | OUTPATIENT
Start: 2021-12-22 | End: 2021-12-23 | Stop reason: SURG

## 2021-12-22 RX ORDER — LABETALOL HYDROCHLORIDE 5 MG/ML
5 INJECTION, SOLUTION INTRAVENOUS EVERY 5 MIN PRN
Status: ACTIVE | OUTPATIENT
Start: 2021-12-22 | End: 2021-12-23

## 2021-12-22 RX ORDER — LABETALOL HYDROCHLORIDE 5 MG/ML
20 INJECTION, SOLUTION INTRAVENOUS ONCE AS NEEDED
Status: COMPLETED | OUTPATIENT
Start: 2021-12-22 | End: 2021-12-22

## 2021-12-22 RX ORDER — HYDRALAZINE HYDROCHLORIDE 20 MG/ML
INJECTION INTRAMUSCULAR; INTRAVENOUS
Status: COMPLETED
Start: 2021-12-22 | End: 2021-12-22

## 2021-12-22 RX ORDER — NALOXONE HYDROCHLORIDE 0.4 MG/ML
80 INJECTION, SOLUTION INTRAMUSCULAR; INTRAVENOUS; SUBCUTANEOUS AS NEEDED
Status: ACTIVE | OUTPATIENT
Start: 2021-12-22 | End: 2021-12-23

## 2021-12-22 RX ORDER — DEXTROSE MONOHYDRATE 25 G/50ML
50 INJECTION, SOLUTION INTRAVENOUS
Status: DISCONTINUED | OUTPATIENT
Start: 2021-12-22 | End: 2021-12-26

## 2021-12-22 RX ORDER — TRISODIUM CITRATE DIHYDRATE AND CITRIC ACID MONOHYDRATE 500; 334 MG/5ML; MG/5ML
SOLUTION ORAL
Status: COMPLETED
Start: 2021-12-22 | End: 2021-12-22

## 2021-12-22 RX ORDER — ACETAMINOPHEN 10 MG/ML
1000 INJECTION, SOLUTION INTRAVENOUS ONCE
Status: DISCONTINUED | OUTPATIENT
Start: 2021-12-22 | End: 2021-12-26

## 2021-12-22 RX ORDER — NIFEDIPINE 30 MG/1
30 TABLET, EXTENDED RELEASE ORAL DAILY
Status: DISCONTINUED | OUTPATIENT
Start: 2021-12-22 | End: 2021-12-22

## 2021-12-22 RX ORDER — SODIUM CHLORIDE 9 MG/ML
INJECTION, SOLUTION INTRAVENOUS CONTINUOUS PRN
Status: DISCONTINUED | OUTPATIENT
Start: 2021-12-22 | End: 2021-12-23 | Stop reason: SURG

## 2021-12-22 RX ORDER — ONDANSETRON 2 MG/ML
4 INJECTION INTRAMUSCULAR; INTRAVENOUS ONCE AS NEEDED
Status: ACTIVE | OUTPATIENT
Start: 2021-12-22 | End: 2021-12-22

## 2021-12-22 RX ORDER — NIFEDIPINE 10 MG/1
10 CAPSULE ORAL ONCE
Status: DISCONTINUED | OUTPATIENT
Start: 2021-12-22 | End: 2021-12-22

## 2021-12-22 RX ORDER — HALOPERIDOL 5 MG/ML
0.25 INJECTION INTRAMUSCULAR ONCE AS NEEDED
Status: ACTIVE | OUTPATIENT
Start: 2021-12-22 | End: 2021-12-22

## 2021-12-22 RX ADMIN — DEXAMETHASONE SODIUM PHOSPHATE 4 MG: 4 MG/ML VIAL (ML) INJECTION at 19:09:00

## 2021-12-22 RX ADMIN — LIDOCAINE HYDROCHLORIDE 3 ML: 10 INJECTION, SOLUTION EPIDURAL; INFILTRATION; INTRACAUDAL; PERINEURAL at 18:31:00

## 2021-12-22 RX ADMIN — LABETALOL HYDROCHLORIDE 5 MG: 5 INJECTION, SOLUTION INTRAVENOUS at 20:10:00

## 2021-12-22 RX ADMIN — CARBOPROST TROMETHAMINE 250 MCG: 250 INJECTION, SOLUTION INTRAMUSCULAR at 19:33:00

## 2021-12-22 RX ADMIN — SODIUM CHLORIDE: 9 INJECTION, SOLUTION INTRAVENOUS at 19:31:00

## 2021-12-22 RX ADMIN — LIDOCAINE HYDROCHLORIDE 50 MG: 10 INJECTION, SOLUTION EPIDURAL; INFILTRATION; INTRACAUDAL; PERINEURAL at 19:05:00

## 2021-12-22 RX ADMIN — LABETALOL HYDROCHLORIDE 5 MG: 5 INJECTION, SOLUTION INTRAVENOUS at 19:17:00

## 2021-12-22 RX ADMIN — EPHEDRINE SULFATE 5 MG: 50 INJECTION INTRAVENOUS at 19:25:00

## 2021-12-22 RX ADMIN — SODIUM CHLORIDE, SODIUM LACTATE, POTASSIUM CHLORIDE, CALCIUM CHLORIDE: 600; 310; 30; 20 INJECTION, SOLUTION INTRAVENOUS at 18:18:00

## 2021-12-22 RX ADMIN — TRANEXAMIC ACID 1000 MG: 10 INJECTION, SOLUTION INTRAVENOUS at 19:29:00

## 2021-12-22 RX ADMIN — EPHEDRINE SULFATE 5 MG: 50 INJECTION INTRAVENOUS at 19:30:00

## 2021-12-22 RX ADMIN — LIDOCAINE HYDROCHLORIDE 5 ML: 10 INJECTION, SOLUTION EPIDURAL; INFILTRATION; INTRACAUDAL; PERINEURAL at 18:25:00

## 2021-12-22 RX ADMIN — ONDANSETRON 4 MG: 2 INJECTION INTRAMUSCULAR; INTRAVENOUS at 19:37:00

## 2021-12-22 RX ADMIN — PHENYLEPHRINE HCL 100 MCG: 10 MG/ML VIAL (ML) INJECTION at 19:32:00

## 2021-12-22 RX ADMIN — PHENYLEPHRINE HCL 100 MCG: 10 MG/ML VIAL (ML) INJECTION at 19:24:00

## 2021-12-22 RX ADMIN — SODIUM CHLORIDE, SODIUM LACTATE, POTASSIUM CHLORIDE, CALCIUM CHLORIDE: 600; 310; 30; 20 INJECTION, SOLUTION INTRAVENOUS at 18:42:00

## 2021-12-22 RX ADMIN — LABETALOL HYDROCHLORIDE 5 MG: 5 INJECTION, SOLUTION INTRAVENOUS at 19:15:00

## 2021-12-22 RX ADMIN — PHENYLEPHRINE HCL 100 MCG: 10 MG/ML VIAL (ML) INJECTION at 19:27:00

## 2021-12-22 RX ADMIN — EPHEDRINE SULFATE 5 MG: 50 INJECTION INTRAVENOUS at 19:33:00

## 2021-12-22 RX ADMIN — LABETALOL HYDROCHLORIDE 5 MG: 5 INJECTION, SOLUTION INTRAVENOUS at 20:06:00

## 2021-12-22 RX ADMIN — ALBUMIN, HUMAN INJ 5%: 5 SOLUTION INTRAVENOUS at 19:37:00

## 2021-12-22 NOTE — ANESTHESIA PREPROCEDURE EVALUATION
Anesthesia PreOp Note    HPI:     Jeff Koenig is a 42year old female who presents for preoperative consultation requested by: Kenroy Mcmahon MD    Date of Surgery: 2021    Procedure(s):   SECTION  Indication: Repeat  MECHELLE  History of IBS    • History of PCOS    • Hypertension    • Infertility, female    • PTSD (post-traumatic stress disorder)        Past Surgical History:   Procedure Laterality Date   •   2019   • IVF PACKAGE  2018   • OTHER SURGICAL HIS sulfate 40mg/ml infusion, 2 g/hr, Intravenous, Continuous, Cristian Malcolm MD  calcium gluconate injection 1 g, 1 g, Intravenous, Once PRN, Mauro Dee MD  ceFAZolin in NS (ANCEF) 3g/100mL IVPB premix, 3 g, Intravenous, Once, Mauro Dee MD  labetalol (N       Spouse name: Not on file      Number of children: Not on file      Years of education: Not on file      Highest education level: Not on file    Occupational History      Occupation: health     Tobacco Use      Smoking status: Never Sm BMI.   oral temperature is 97.5 °F (36.4 °C). Her blood pressure is 178/103 (abnormal) and her pulse is 106.  Her respiration is 17.    12/22/21  1640 12/22/21  1645 12/22/21  1715 12/22/21  1730   BP: (!) 173/89 (!) 162/87 (!) 170/98 (!) 178/103   Pulse: 9

## 2021-12-22 NOTE — H&P
[facility]  Obstetrics and Gynecology  History & Physical    Feliciano Maikel Patient Status:  Observation    3/23/1980 MRN [id number]   Location [address]  Attending Nikhil, [address] [facility] Date   • DVT (deep vein thrombosis) in pregnancy 05/2021   • High myopia 1986   • History of IBS    • History of PCOS    • Hypertension    • Infertility, female    • PTSD (post-traumatic stress disorder)      Past Social History:   Past Surgical History: 32G X 4 MM Does not apply Misc, Use with Levemir Flextouch QHS, Disp: 100 each, Rfl: 2  Continuous Blood Gluc Sensor (FREESTYLE WILLIAMS 14 DAY SENSOR) Does not apply Misc, 1 each by In Vitro route 4 (four) times daily. , Disp: 1 each, Rfl: 3      Allergies: resistance     Pre-existing type 2 diabetes mellitus during pregnancy, antepartum     Floaters in visual field, bilateral     IBS (irritable bowel syndrome)     Previous  delivery affecting pregnancy     Pre-existing essential hypertension during p . Risks, benefits, alternatives and possible complications have been discussed in detail with the patient. Pre-admission, admission, and post admission procedures and expectations were discussed in detail.   All questions answered, all appropri

## 2021-12-22 NOTE — PROGRESS NOTES
Labor Progress Note  Subjective:   Patient comfortable without complaints at this time.      Objective:    12/22/21  1645   BP: (!) 162/87   Pulse: 101   Resp: 17   Temp:       FHR: Baseline 140 BPM, Moderate variability, + accelerations, no decelerations

## 2021-12-22 NOTE — CONSULTS
[facility] Patient Status:  Observation    3/23/1980 MRN [id number]   Location [facility] Attending [facility] Day # 0 PCP Stepan Santiago DO     SUBJECTIVE: 2  Continuous Blood Gluc Sensor (FREESTYLE WILLIAMS 14 DAY SENSOR) Does not apply Misc, 1 each by In Vitro route 4 (four) times daily. , Disp: 1 each, Rfl: 3        Allergies:    Lisinopril              Coughing     Past Medical History:  Past Medical History: 12/21/2021    HGB 10.2 12/21/2021    HCT 31.6 12/21/2021    .0 12/21/2021    CREATSERUM 0.63 12/21/2021    BUN 12 12/21/2021     12/21/2021    K 3.7 12/21/2021     12/21/2021    CO2 21.0 12/21/2021     12/21/2021    CA 8.6 12/21/2

## 2021-12-22 NOTE — PROGRESS NOTES
Pt is a 42year old female admitted to LDR3/LDR3-A. Patient presents with:  R/o Pih     Pt is  35w0d intra-uterine pregnancy. History obtained, consents signed. Oriented to room, staff, and plan of care.

## 2021-12-23 ENCOUNTER — TELEPHONE (OUTPATIENT)
Dept: PERINATAL CARE | Facility: HOSPITAL | Age: 41
End: 2021-12-23

## 2021-12-23 LAB
ALBUMIN SERPL-MCNC: 2 G/DL (ref 3.4–5)
ALBUMIN/GLOB SERPL: 0.6 {RATIO} (ref 1–2)
ALP LIVER SERPL-CCNC: 93 U/L
ALT SERPL-CCNC: 27 U/L
ANION GAP SERPL CALC-SCNC: 11 MMOL/L (ref 0–18)
AST SERPL-CCNC: 41 U/L (ref 15–37)
BASOPHILS # BLD AUTO: 0.02 X10(3) UL (ref 0–0.2)
BASOPHILS NFR BLD AUTO: 0.2 %
BILIRUB SERPL-MCNC: 0.3 MG/DL (ref 0.1–2)
BLOOD TYPE BARCODE: 6200
BUN BLD-MCNC: 12 MG/DL (ref 7–18)
BUN/CREAT SERPL: 20.7 (ref 10–20)
CALCIUM BLD-MCNC: 7.7 MG/DL (ref 8.5–10.1)
CHLORIDE SERPL-SCNC: 105 MMOL/L (ref 98–112)
CO2 SERPL-SCNC: 20 MMOL/L (ref 21–32)
CREAT BLD-MCNC: 0.58 MG/DL
DEPRECATED RDW RBC AUTO: 45.2 FL (ref 35.1–46.3)
EOSINOPHIL # BLD AUTO: 0.01 X10(3) UL (ref 0–0.7)
EOSINOPHIL NFR BLD AUTO: 0.1 %
ERYTHROCYTE [DISTWIDTH] IN BLOOD BY AUTOMATED COUNT: 14.6 % (ref 11–15)
GLOBULIN PLAS-MCNC: 3.2 G/DL (ref 2.8–4.4)
GLUCOSE BLD-MCNC: 138 MG/DL (ref 70–99)
GLUCOSE BLDC GLUCOMTR-MCNC: 115 MG/DL (ref 70–99)
GLUCOSE BLDC GLUCOMTR-MCNC: 118 MG/DL (ref 70–99)
GLUCOSE BLDC GLUCOMTR-MCNC: 136 MG/DL (ref 70–99)
HCT VFR BLD AUTO: 26.8 %
HGB BLD-MCNC: 8.2 G/DL
IMM GRANULOCYTES # BLD AUTO: 0.11 X10(3) UL (ref 0–1)
IMM GRANULOCYTES NFR BLD: 0.9 %
LYMPHOCYTES # BLD AUTO: 0.97 X10(3) UL (ref 1–4)
LYMPHOCYTES NFR BLD AUTO: 7.7 %
MAGNESIUM SERPL-MCNC: 4 MG/DL (ref 4.8–8.4)
MAGNESIUM SERPL-MCNC: 4.6 MG/DL (ref 4.8–8.4)
MAGNESIUM SERPL-MCNC: 5.2 MG/DL (ref 4.8–8.4)
MAGNESIUM SERPL-MCNC: 5.4 MG/DL (ref 4.8–8.4)
MCH RBC QN AUTO: 26.4 PG (ref 26–34)
MCHC RBC AUTO-ENTMCNC: 30.6 G/DL (ref 31–37)
MCV RBC AUTO: 86.2 FL
MONOCYTES # BLD AUTO: 0.71 X10(3) UL (ref 0.1–1)
MONOCYTES NFR BLD AUTO: 5.6 %
NEUTROPHILS # BLD AUTO: 10.82 X10 (3) UL (ref 1.5–7.7)
NEUTROPHILS # BLD AUTO: 10.82 X10(3) UL (ref 1.5–7.7)
NEUTROPHILS NFR BLD AUTO: 85.5 %
OSMOLALITY SERPL CALC.SUM OF ELEC: 284 MOSM/KG (ref 275–295)
PLATELET # BLD AUTO: 175 10(3)UL (ref 150–450)
POTASSIUM SERPL-SCNC: 4.3 MMOL/L (ref 3.5–5.1)
PROT SERPL-MCNC: 5.2 G/DL (ref 6.4–8.2)
RBC # BLD AUTO: 3.11 X10(6)UL
SODIUM SERPL-SCNC: 136 MMOL/L (ref 136–145)
WBC # BLD AUTO: 12.6 X10(3) UL (ref 4–11)

## 2021-12-23 RX ORDER — AMMONIA INHALANTS 0.04 G/.3ML
0.3 INHALANT RESPIRATORY (INHALATION) AS NEEDED
Status: DISCONTINUED | OUTPATIENT
Start: 2021-12-23 | End: 2021-12-26

## 2021-12-23 RX ORDER — DOCUSATE SODIUM 100 MG/1
100 CAPSULE, LIQUID FILLED ORAL
Status: DISCONTINUED | OUTPATIENT
Start: 2021-12-23 | End: 2021-12-24

## 2021-12-23 RX ORDER — ACETAMINOPHEN 500 MG
1000 TABLET ORAL EVERY 6 HOURS
Status: DISCONTINUED | OUTPATIENT
Start: 2021-12-24 | End: 2021-12-26

## 2021-12-23 RX ORDER — EPHEDRINE SULFATE 50 MG/ML
INJECTION INTRAVENOUS AS NEEDED
Status: DISCONTINUED | OUTPATIENT
Start: 2021-12-22 | End: 2021-12-23 | Stop reason: SURG

## 2021-12-23 RX ORDER — SIMETHICONE 80 MG
80 TABLET,CHEWABLE ORAL 3 TIMES DAILY PRN
Status: DISCONTINUED | OUTPATIENT
Start: 2021-12-23 | End: 2021-12-26

## 2021-12-23 RX ORDER — BISACODYL 10 MG
10 SUPPOSITORY, RECTAL RECTAL ONCE AS NEEDED
Status: DISCONTINUED | OUTPATIENT
Start: 2021-12-23 | End: 2021-12-26

## 2021-12-23 RX ORDER — IBUPROFEN 600 MG/1
600 TABLET ORAL EVERY 6 HOURS
Status: DISCONTINUED | OUTPATIENT
Start: 2021-12-24 | End: 2021-12-26

## 2021-12-23 RX ORDER — LIDOCAINE HYDROCHLORIDE 10 MG/ML
INJECTION, SOLUTION EPIDURAL; INFILTRATION; INTRACAUDAL; PERINEURAL AS NEEDED
Status: DISCONTINUED | OUTPATIENT
Start: 2021-12-22 | End: 2021-12-23 | Stop reason: SURG

## 2021-12-23 RX ORDER — ONDANSETRON 2 MG/ML
INJECTION INTRAMUSCULAR; INTRAVENOUS AS NEEDED
Status: DISCONTINUED | OUTPATIENT
Start: 2021-12-22 | End: 2021-12-23 | Stop reason: SURG

## 2021-12-23 RX ORDER — ENOXAPARIN SODIUM 100 MG/ML
80 INJECTION SUBCUTANEOUS ONCE
Status: COMPLETED | OUTPATIENT
Start: 2021-12-23 | End: 2021-12-23

## 2021-12-23 RX ORDER — ENOXAPARIN SODIUM 150 MG/ML
120 INJECTION SUBCUTANEOUS EVERY 12 HOURS SCHEDULED
Status: DISCONTINUED | OUTPATIENT
Start: 2021-12-23 | End: 2021-12-26

## 2021-12-23 RX ORDER — DIPHENHYDRAMINE HYDROCHLORIDE 50 MG/ML
INJECTION INTRAMUSCULAR; INTRAVENOUS AS NEEDED
Status: DISCONTINUED | OUTPATIENT
Start: 2021-12-23 | End: 2021-12-23 | Stop reason: SURG

## 2021-12-23 RX ORDER — ONDANSETRON 2 MG/ML
4 INJECTION INTRAMUSCULAR; INTRAVENOUS EVERY 6 HOURS PRN
Status: DISCONTINUED | OUTPATIENT
Start: 2021-12-23 | End: 2021-12-26

## 2021-12-23 RX ORDER — PHENYLEPHRINE HCL 10 MG/ML
VIAL (ML) INJECTION AS NEEDED
Status: DISCONTINUED | OUTPATIENT
Start: 2021-12-22 | End: 2021-12-23 | Stop reason: SURG

## 2021-12-23 RX ORDER — DEXAMETHASONE SODIUM PHOSPHATE 4 MG/ML
VIAL (ML) INJECTION AS NEEDED
Status: DISCONTINUED | OUTPATIENT
Start: 2021-12-22 | End: 2021-12-23 | Stop reason: SURG

## 2021-12-23 RX ORDER — HYDROCODONE BITARTRATE AND ACETAMINOPHEN 5; 325 MG/1; MG/1
1 TABLET ORAL EVERY 6 HOURS PRN
Status: DISCONTINUED | OUTPATIENT
Start: 2021-12-23 | End: 2021-12-26

## 2021-12-23 RX ADMIN — ALBUMIN, HUMAN INJ 5%: 5 SOLUTION INTRAVENOUS at 07:42:00

## 2021-12-23 RX ADMIN — DIPHENHYDRAMINE HYDROCHLORIDE 25 MG: 50 INJECTION INTRAMUSCULAR; INTRAVENOUS at 06:48:00

## 2021-12-23 RX ADMIN — SODIUM CHLORIDE: 9 INJECTION, SOLUTION INTRAVENOUS at 07:42:00

## 2021-12-23 RX ADMIN — LIDOCAINE HYDROCHLORIDE 5 ML: 10 INJECTION, SOLUTION EPIDURAL; INFILTRATION; INTRACAUDAL; PERINEURAL at 06:55:00

## 2021-12-23 RX ADMIN — DIPHENHYDRAMINE HYDROCHLORIDE 25 MG: 50 INJECTION INTRAMUSCULAR; INTRAVENOUS at 06:40:00

## 2021-12-23 NOTE — PROGRESS NOTES
Spoke to Dr. Marilynn Sanchez regarding order clarification. She would like to me hold her morning labetalol due to blood pressure being low. She would like me to hold the labetalol.  She would also like the order for her lovenox to be 120mg BID which the current

## 2021-12-23 NOTE — PROGRESS NOTES
Pt. Instructed on how to use breast pump. Pt. Requests to wait an additional 30 min prior to pumping.

## 2021-12-23 NOTE — PROGRESS NOTES
No HA, visual changes, or RUQ pain. + c/o incisional pain. Temp  97.8 . ..               Temp     Temp Source  Oral              Temp Source     Heart Rate     94   99        Heart Rate     Resp rate  17  18 17   16  17   16   Resp rate     BP (cuff) Neutrophil Absolute Prelim   1.50 - 7.70 x10 (3) uL 10.82 High     Neutrophil Absolute   1.50 - 7.70 x10(3) uL 10.82 High     Lymphocyte Absolute   1.00 - 4.00 x10(3) uL 0.97 Low     Monocyte Absolute   0.10 - 1.00 x10(3) uL 0.71    Eosinophil Absolute

## 2021-12-23 NOTE — OPERATIVE REPORT
Date of Procedure: 12/22/21    Preoperative Diagnosis: IUP at 35w0d, chronic HTN with superimposed preeclampsia with severe features, type 2 diabetes, AMA, obesity, history of DVT.       Postoperative Diagnosis: Same - Delivered    Procedure:  Repeat Low Tr fascia was incised and extended laterally with bovie cautery and carr scissors. The rectus muscles were  superiorly and inferiorly. The peritoneal cavity was entered sharply with pickups and carr scissors. This was extended superiorly.  The rectu patient tolerated the procedure and was stable to the recovery room. The infant was stable to the special care nursery.     Specimen:  Placenta     Drains: senior to dependant drainage    Condition:   stable    Complications: None; patient tolerated the pro

## 2021-12-23 NOTE — ANESTHESIA PROCEDURE NOTES
Spinal Block  Performed by: Cristobal Diaz DO  Authorized by: Cristobal Diaz DO       General Information and Staff    Start Time:   Anesthesiologist: Cristobal Diaz DO  Performed by:   Anesthesiologist  Preanesthetic Checklist: patient

## 2021-12-23 NOTE — ANESTHESIA POSTPROCEDURE EVALUATION
Patient: Zully Childress    Procedure Summary     Date: 21 Room / Location: [address] [place] [address]+D OR  [facility] [place]    Anesthesia Start:  Anesthesia Stop:     Procedure:  SECTION (N/A ) Diagnosis: (Same)    Surgeons: Sumeet Espinoza MD Anesthesiolog

## 2021-12-23 NOTE — LACTATION NOTE
LACTATION NOTE - MOTHER      Evaluation Type: Inpatient         Maternal history  Maternal history: AMA; Caesarean section; Infertility;Obesity;Diabetes Mellitus; Polycystic ovarian syndrome (PCOS)    Breastfeeding goal  Breastfeeding goal: To maintain breast

## 2021-12-24 LAB
BLOOD TYPE BARCODE: 6200
GLUCOSE BLDC GLUCOMTR-MCNC: 108 MG/DL (ref 70–99)
GLUCOSE BLDC GLUCOMTR-MCNC: 118 MG/DL (ref 70–99)
MAGNESIUM SERPL-MCNC: 2.2 MG/DL (ref 4.8–8.4)
MAGNESIUM SERPL-MCNC: 2.7 MG/DL (ref 4.8–8.4)

## 2021-12-24 NOTE — LACTATION NOTE
LACTATION NOTE - MOTHER      Evaluation Type: Inpatient    Problems identified  Problems identified: Knowledge deficit;Milk supply not WNL  Milk supply not WNL: Reduced (potential)  Problems Identified Other: Mother is not pumping consistently d/t increase

## 2021-12-24 NOTE — PROGRESS NOTES
+ c/o feeling pain all over. + HA, some nausea  bp 130/65    abd wound cdi  EXT no calf tenderness    POD#2 stable. Will hold procardia and see if that helps with her HA and pain.

## 2021-12-25 PROBLEM — O14.13 SEVERE PRE-ECLAMPSIA IN THIRD TRIMESTER: Status: ACTIVE | Noted: 2021-12-25

## 2021-12-25 PROBLEM — O14.13 SEVERE PRE-ECLAMPSIA IN THIRD TRIMESTER (HCC): Status: ACTIVE | Noted: 2021-12-25

## 2021-12-25 LAB
GLUCOSE BLDC GLUCOMTR-MCNC: 107 MG/DL (ref 70–99)
GLUCOSE BLDC GLUCOMTR-MCNC: 107 MG/DL (ref 70–99)
GLUCOSE BLDC GLUCOMTR-MCNC: 115 MG/DL (ref 70–99)
GLUCOSE BLDC GLUCOMTR-MCNC: 131 MG/DL (ref 70–99)

## 2021-12-25 RX ORDER — HYDROCODONE BITARTRATE AND ACETAMINOPHEN 5; 325 MG/1; MG/1
1 TABLET ORAL EVERY 6 HOURS PRN
Qty: 30 TABLET | Refills: 0 | Status: SHIPPED | OUTPATIENT
Start: 2021-12-25 | End: 2022-01-26

## 2021-12-25 RX ORDER — LABETALOL 200 MG/1
400 TABLET, FILM COATED ORAL EVERY 12 HOURS SCHEDULED
Qty: 90 TABLET | Refills: 0 | Status: SHIPPED | OUTPATIENT
Start: 2021-12-25

## 2021-12-25 RX ORDER — IBUPROFEN 600 MG/1
600 TABLET ORAL EVERY 6 HOURS PRN
Qty: 30 TABLET | Refills: 0 | Status: SHIPPED | OUTPATIENT
Start: 2021-12-25

## 2021-12-25 RX ORDER — DOCUSATE SODIUM 100 MG/1
100 CAPSULE, LIQUID FILLED ORAL 2 TIMES DAILY
Qty: 30 CAPSULE | Refills: 0 | Status: SHIPPED | OUTPATIENT
Start: 2021-12-25

## 2021-12-25 NOTE — PLAN OF CARE
Problem: POSTPARTUM  Goal: Long Term Goal:Experiences normal postpartum course  Description: INTERVENTIONS:  - Assess and monitor vital signs and lab values. - Assess fundus and lochia. - Provide ice/sitz baths for perineum discomfort.   - Monitor heali avoidance of specific medications or substances incompatible with breast feeding.  - Assess and monitor for signs of nipple pain/trauma. - Instruct and provide assistance with proper latch.   - Review techniques for milk expression (breast pumping) and sto Nadia Jaime RN  Outcome: Progressing

## 2021-12-25 NOTE — PLAN OF CARE
Problem: POSTPARTUM  Goal: Long Term Goal:Experiences normal postpartum course  Description: INTERVENTIONS:  - Assess and monitor vital signs and lab values. - Assess fundus and lochia. - Provide ice/sitz baths for perineum discomfort.   - Monitor heali and how to manage common lactation challenges. - Recommend avoidance of specific medications or substances incompatible with breast feeding.  - Assess and monitor for signs of nipple pain/trauma. - Instruct and provide assistance with proper latch.   - Re to participate in  daily care. - Assess support systems available to mother/family.  - Provide /case management support as needed.   2021 0654 by Coby Monet RN  Outcome: Completed  2021 06 by Anne Holloway

## 2021-12-25 NOTE — PROGRESS NOTES
Still having diffuse pain and difficulty walking. AFEB VSS  Wound cdi    POD#3 stable but with significant pain. Will continue hospitalization until  Tomorrow.

## 2021-12-26 VITALS
RESPIRATION RATE: 16 BRPM | HEART RATE: 84 BPM | WEIGHT: 281.06 LBS | DIASTOLIC BLOOD PRESSURE: 61 MMHG | OXYGEN SATURATION: 98 % | TEMPERATURE: 98 F | SYSTOLIC BLOOD PRESSURE: 139 MMHG | BODY MASS INDEX: 51 KG/M2

## 2021-12-26 PROBLEM — Z34.90 PREGNANT (HCC): Status: RESOLVED | Noted: 2021-12-21 | Resolved: 2021-12-26

## 2021-12-26 PROBLEM — Z34.90 PREGNANT: Status: RESOLVED | Noted: 2021-12-21 | Resolved: 2021-12-26

## 2021-12-26 LAB — GLUCOSE BLDC GLUCOMTR-MCNC: 97 MG/DL (ref 70–99)

## 2021-12-26 RX ORDER — FAMOTIDINE 20 MG/1
20 TABLET ORAL 2 TIMES DAILY
Status: DISCONTINUED | OUTPATIENT
Start: 2021-12-26 | End: 2021-12-26

## 2021-12-26 NOTE — PROGRESS NOTES
Pain better controlled. No heavy bleeding or pain. HA is gone. 97.7 . ..     97.5 . ..       Temp          Temp Source     Oral     Oral      Temp Source     Heart Rate     80  84 82  84      Heart Rate     Resp rate     18     16      Resp rate     BP (

## 2021-12-26 NOTE — DISCHARGE SUMMARY
[facility]    Discharge Summary    Pa Campos Patient Status:  Inpatient    3/23/1980 MRN [id number]   Location [facility] 3SE Attending Karri Coleman MD   [facility] Day # 5 PCP Shanel Arizmendi DO     Date of Admission: 2021 every 6 (six) hours as needed. ibuprofen 600 MG Oral Tab  Take 1 tablet (600 mg total) by mouth every 6 (six) hours as needed for Pain (for pain). docusate sodium 100 MG Oral Cap  Take 1 capsule (100 mg total) by mouth 2 (two) times daily.       Home shortness of breath. Monitor your incision daily for any redness, swelling, drainage with foul odor, or warmth to touch. You are on pelvic rest for 6 weeks.  This means nothing in the vagina (no intercourse, tampons, etc.) and no baths or swimming po

## 2021-12-28 ENCOUNTER — TELEPHONE (OUTPATIENT)
Dept: OBGYN CLINIC | Facility: CLINIC | Age: 41
End: 2021-12-28

## 2021-12-28 LAB — T PALLIDUM AB SER QL: NEGATIVE

## 2021-12-28 NOTE — TELEPHONE ENCOUNTER
RN contacted pt. Pt unable to come in today. Pt scheduled with [facility] tomorrow for staple removal. Pt agrees to [facility].

## 2021-12-28 NOTE — TELEPHONE ENCOUNTER
The patient called to see when she is supposed to have her staples removed from the c- section she had done on 12/22/21.

## 2021-12-29 ENCOUNTER — POSTPARTUM (OUTPATIENT)
Dept: OBGYN CLINIC | Facility: CLINIC | Age: 41
End: 2021-12-29

## 2021-12-29 VITALS — HEIGHT: 62 IN | BODY MASS INDEX: 51 KG/M2 | DIASTOLIC BLOOD PRESSURE: 90 MMHG | SYSTOLIC BLOOD PRESSURE: 148 MMHG

## 2021-12-29 DIAGNOSIS — Z09 POSTOP CHECK: Primary | ICD-10-CM

## 2021-12-29 PROCEDURE — 3080F DIAST BP >= 90 MM HG: CPT | Performed by: OBSTETRICS & GYNECOLOGY

## 2021-12-29 PROCEDURE — 99024 POSTOP FOLLOW-UP VISIT: CPT | Performed by: OBSTETRICS & GYNECOLOGY

## 2021-12-29 PROCEDURE — 3077F SYST BP >= 140 MM HG: CPT | Performed by: OBSTETRICS & GYNECOLOGY

## 2021-12-29 NOTE — PROGRESS NOTES
CC: Patient is here for staples removal    HPI: Patient is a 42year old  for above. No complaints. Home bp 140/90      Patient's last menstrual period was 2021.     OB History    Para Term  AB Living   2 2 1 1   2   SAB IAB Ectop Used    Vaping Use      Vaping Use: Never used    Substance and Sexual Activity      Alcohol use: No      Drug use: No      Sexual activity: Yes        Partners: Female        Comment: with wife     Other Topics      Concerns:         Service: Not

## 2022-01-04 ENCOUNTER — PATIENT MESSAGE (OUTPATIENT)
Dept: OBGYN CLINIC | Facility: CLINIC | Age: 42
End: 2022-01-04

## 2022-01-05 NOTE — TELEPHONE ENCOUNTER
From: Yin Harris  To: Goran Ahuja MD  Sent: 1/4/2022 7:23 PM CST  Subject: Other    Hello,  Can someone please send me a doctor's note saying I started my maternity leave on 12/22/21?   I know my paperwork was already submitted saying ma

## 2022-01-11 NOTE — TELEPHONE ENCOUNTER
Montserrat Downs,     I re-faxed the forms with the new date.     Any concerns or additional questions, please call us at .

## 2022-01-13 ENCOUNTER — TELEPHONE (OUTPATIENT)
Dept: OBGYN UNIT | Facility: HOSPITAL | Age: 42
End: 2022-01-13

## 2022-01-26 ENCOUNTER — POSTPARTUM (OUTPATIENT)
Dept: OBGYN CLINIC | Facility: CLINIC | Age: 42
End: 2022-01-26
Payer: COMMERCIAL

## 2022-01-26 VITALS
WEIGHT: 255 LBS | DIASTOLIC BLOOD PRESSURE: 80 MMHG | BODY MASS INDEX: 46.93 KG/M2 | HEIGHT: 62 IN | SYSTOLIC BLOOD PRESSURE: 122 MMHG

## 2022-01-26 DIAGNOSIS — O10.019 PRE-EXISTING ESSENTIAL HYPERTENSION DURING PREGNANCY, ANTEPARTUM: ICD-10-CM

## 2022-01-26 DIAGNOSIS — Z12.31 ENCOUNTER FOR SCREENING MAMMOGRAM FOR MALIGNANT NEOPLASM OF BREAST: ICD-10-CM

## 2022-01-26 DIAGNOSIS — O24.119 PRE-EXISTING TYPE 2 DIABETES MELLITUS DURING PREGNANCY, ANTEPARTUM: ICD-10-CM

## 2022-01-26 PROBLEM — O16.3 HYPERTENSION AFFECTING PREGNANCY IN THIRD TRIMESTER: Chronic | Status: RESOLVED | Noted: 2019-01-10 | Resolved: 2022-01-26

## 2022-01-26 PROBLEM — O16.3 HYPERTENSION AFFECTING PREGNANCY IN THIRD TRIMESTER (HCC): Chronic | Status: RESOLVED | Noted: 2019-01-10 | Resolved: 2022-01-26

## 2022-01-26 PROCEDURE — 3079F DIAST BP 80-89 MM HG: CPT | Performed by: OBSTETRICS & GYNECOLOGY

## 2022-01-26 PROCEDURE — 3008F BODY MASS INDEX DOCD: CPT | Performed by: OBSTETRICS & GYNECOLOGY

## 2022-01-26 PROCEDURE — 88175 CYTOPATH C/V AUTO FLUID REDO: CPT | Performed by: OBSTETRICS & GYNECOLOGY

## 2022-01-26 PROCEDURE — 3074F SYST BP LT 130 MM HG: CPT | Performed by: OBSTETRICS & GYNECOLOGY

## 2022-01-26 PROCEDURE — 87624 HPV HI-RISK TYP POOLED RSLT: CPT | Performed by: OBSTETRICS & GYNECOLOGY

## 2022-01-26 NOTE — PROGRESS NOTES
GYN H&P     2022  1:47 PM    CC: Patient is here for 6 W PP visit    HPI: Patient is a 42year old  for above. Patient had repeat c/s, diabetes in pregnancy, chtn with superimposed preeclampsia, DVT in 1st trimester.      Currently breastfee History of IBS    • History of PCOS    • Hypertension    • Infertility, female    • PTSD (post-traumatic stress disorder)      Past Surgical History:   Procedure Laterality Date   •   2019, 2021   • IVF PACKAGE  2018   • OTHER SURGI tenderness or masses, no discharge  Vagina: normal pink mucosa, no lesions, normal clear discharge.    Uterus: midline, mobile, non-tender, firm and smooth  Cervix: pink, no lesions grossly visible, no discharge  Adnexa: non tender, no palpable masses, norm

## 2022-01-27 LAB — HPV I/H RISK 1 DNA SPEC QL NAA+PROBE: NEGATIVE

## 2022-02-10 RX ORDER — LABETALOL 200 MG/1
TABLET, FILM COATED ORAL
Qty: 120 TABLET | Refills: 8 | OUTPATIENT
Start: 2022-02-10

## 2022-02-19 ENCOUNTER — PATIENT MESSAGE (OUTPATIENT)
Dept: OBGYN CLINIC | Facility: CLINIC | Age: 42
End: 2022-02-19

## 2022-02-19 ENCOUNTER — TELEPHONE (OUTPATIENT)
Dept: OBGYN CLINIC | Facility: CLINIC | Age: 42
End: 2022-02-19

## 2022-02-19 RX ORDER — LABETALOL 200 MG/1
400 TABLET, FILM COATED ORAL 2 TIMES DAILY
Qty: 120 TABLET | Refills: 0 | Status: SHIPPED | OUTPATIENT
Start: 2022-02-19 | End: 2022-02-28

## 2022-02-19 NOTE — TELEPHONE ENCOUNTER
Telephone call:     Pt paged due to running out of PO labetalol 400 mg BID. Noted PCP alexey at the end of the month. 1 mo refill sent to her preferred pharmacy at the time: [facility] [place] on [address]. Dr. Kael Bryan MD    [facility] 10 OBGYN     This note was created by [facility] voice recognition. Errors in content may be related to improper recognition by the system; efforts to review and correct have been done but errors may still exist. Please be advised the primary purpose of this note is for me to communicate medical care. Standard sentence structure is not always used. Medical terminology and medical abbreviations may be used. There may be grammatical, typographical, and automated fill ins that may have errors missed in proofreading.

## 2022-02-28 ENCOUNTER — LAB ENCOUNTER (OUTPATIENT)
Dept: LAB | Facility: REFERENCE LAB | Age: 42
End: 2022-02-28
Attending: FAMILY MEDICINE
Payer: COMMERCIAL

## 2022-02-28 ENCOUNTER — OFFICE VISIT (OUTPATIENT)
Dept: FAMILY MEDICINE CLINIC | Facility: CLINIC | Age: 42
End: 2022-02-28
Payer: COMMERCIAL

## 2022-02-28 VITALS
DIASTOLIC BLOOD PRESSURE: 84 MMHG | WEIGHT: 263 LBS | HEART RATE: 82 BPM | OXYGEN SATURATION: 98 % | BODY MASS INDEX: 48.4 KG/M2 | HEIGHT: 62 IN | SYSTOLIC BLOOD PRESSURE: 134 MMHG

## 2022-02-28 DIAGNOSIS — Z00.00 PHYSICAL EXAM, ANNUAL: Primary | ICD-10-CM

## 2022-02-28 DIAGNOSIS — E28.2 PCOS (POLYCYSTIC OVARIAN SYNDROME): ICD-10-CM

## 2022-02-28 DIAGNOSIS — I10 ESSENTIAL HYPERTENSION: ICD-10-CM

## 2022-02-28 DIAGNOSIS — H91.92 DECREASED HEARING OF LEFT EAR: ICD-10-CM

## 2022-02-28 DIAGNOSIS — Z86.718 HISTORY OF DVT (DEEP VEIN THROMBOSIS): ICD-10-CM

## 2022-02-28 LAB
ALBUMIN SERPL-MCNC: 3.7 G/DL (ref 3.4–5)
ALBUMIN/GLOB SERPL: 1.1 {RATIO} (ref 1–2)
ALP LIVER SERPL-CCNC: 77 U/L
ALT SERPL-CCNC: 36 U/L
ANION GAP SERPL CALC-SCNC: 7 MMOL/L (ref 0–18)
AST SERPL-CCNC: 19 U/L (ref 15–37)
BASOPHILS NFR BLD AUTO: 0.6 %
BILIRUB SERPL-MCNC: 0.3 MG/DL (ref 0.1–2)
BUN BLD-MCNC: 15 MG/DL (ref 7–18)
BUN/CREAT SERPL: 17.9 (ref 10–20)
CALCIUM BLD-MCNC: 8.8 MG/DL (ref 8.5–10.1)
CHLORIDE SERPL-SCNC: 110 MMOL/L (ref 98–112)
CHOLEST SERPL-MCNC: 184 MG/DL (ref ?–200)
CO2 SERPL-SCNC: 27 MMOL/L (ref 21–32)
CREAT BLD-MCNC: 0.84 MG/DL
DEPRECATED RDW RBC AUTO: 48.7 FL (ref 35.1–46.3)
EOSINOPHIL # BLD AUTO: 0.11 X10(3) UL (ref 0–0.7)
EOSINOPHIL NFR BLD AUTO: 1.5 %
EST. AVERAGE GLUCOSE BLD GHB EST-MCNC: 117 MG/DL (ref 68–126)
FASTING PATIENT LIPID ANSWER: NO
FASTING STATUS PATIENT QL REPORTED: NO
GLUCOSE BLD-MCNC: 106 MG/DL (ref 70–99)
HBA1C MFR BLD: 5.7 % (ref ?–5.7)
HCT VFR BLD AUTO: 35.6 %
HDLC SERPL-MCNC: 51 MG/DL (ref 40–59)
HGB BLD-MCNC: 10.4 G/DL
IMM GRANULOCYTES NFR BLD: 0.3 %
LDLC SERPL CALC-MCNC: 104 MG/DL (ref ?–100)
LYMPHOCYTES # BLD AUTO: 2.01 X10(3) UL (ref 1–4)
LYMPHOCYTES NFR BLD AUTO: 28.3 %
MCH RBC QN AUTO: 23.9 PG (ref 26–34)
MCHC RBC AUTO-ENTMCNC: 29.2 G/DL (ref 31–37)
MCV RBC AUTO: 81.7 FL
MONOCYTES # BLD AUTO: 0.49 X10(3) UL (ref 0.1–1)
MONOCYTES NFR BLD AUTO: 6.9 %
NEUTROPHILS # BLD AUTO: 4.43 X10 (3) UL (ref 1.5–7.7)
NEUTROPHILS # BLD AUTO: 4.43 X10(3) UL (ref 1.5–7.7)
NEUTROPHILS NFR BLD AUTO: 62.4 %
NONHDLC SERPL-MCNC: 133 MG/DL (ref ?–130)
OSMOLALITY SERPL CALC.SUM OF ELEC: 299 MOSM/KG (ref 275–295)
PLATELET # BLD AUTO: 241 10(3)UL (ref 150–450)
POTASSIUM SERPL-SCNC: 4.3 MMOL/L (ref 3.5–5.1)
PROT SERPL-MCNC: 7.1 G/DL (ref 6.4–8.2)
RBC # BLD AUTO: 4.36 X10(6)UL
SODIUM SERPL-SCNC: 144 MMOL/L (ref 136–145)
TRIGL SERPL-MCNC: 167 MG/DL (ref 30–149)
VLDLC SERPL CALC-MCNC: 28 MG/DL (ref 0–30)
WBC # BLD AUTO: 7.1 X10(3) UL (ref 4–11)

## 2022-02-28 PROCEDURE — 85025 COMPLETE CBC W/AUTO DIFF WBC: CPT | Performed by: FAMILY MEDICINE

## 2022-02-28 PROCEDURE — 3075F SYST BP GE 130 - 139MM HG: CPT | Performed by: FAMILY MEDICINE

## 2022-02-28 PROCEDURE — 36415 COLL VENOUS BLD VENIPUNCTURE: CPT | Performed by: FAMILY MEDICINE

## 2022-02-28 PROCEDURE — 3008F BODY MASS INDEX DOCD: CPT | Performed by: FAMILY MEDICINE

## 2022-02-28 PROCEDURE — 3079F DIAST BP 80-89 MM HG: CPT | Performed by: FAMILY MEDICINE

## 2022-02-28 PROCEDURE — 3044F HG A1C LEVEL LT 7.0%: CPT | Performed by: FAMILY MEDICINE

## 2022-02-28 PROCEDURE — 99204 OFFICE O/P NEW MOD 45 MIN: CPT | Performed by: FAMILY MEDICINE

## 2022-02-28 PROCEDURE — 80061 LIPID PANEL: CPT | Performed by: FAMILY MEDICINE

## 2022-02-28 PROCEDURE — 99386 PREV VISIT NEW AGE 40-64: CPT | Performed by: FAMILY MEDICINE

## 2022-02-28 PROCEDURE — 80053 COMPREHEN METABOLIC PANEL: CPT | Performed by: FAMILY MEDICINE

## 2022-02-28 PROCEDURE — 83036 HEMOGLOBIN GLYCOSYLATED A1C: CPT | Performed by: FAMILY MEDICINE

## 2022-02-28 RX ORDER — LABETALOL 200 MG/1
400 TABLET, FILM COATED ORAL 2 TIMES DAILY
Qty: 240 TABLET | Refills: 0 | Status: SHIPPED | OUTPATIENT
Start: 2022-02-28 | End: 2022-03-14

## 2022-03-09 ENCOUNTER — TELEPHONE (OUTPATIENT)
Dept: OBGYN CLINIC | Facility: CLINIC | Age: 42
End: 2022-03-09

## 2022-03-09 PROBLEM — O14.13 SEVERE PRE-ECLAMPSIA IN THIRD TRIMESTER: Status: RESOLVED | Noted: 2021-12-25 | Resolved: 2022-03-09

## 2022-03-09 PROBLEM — O14.13 SEVERE PRE-ECLAMPSIA IN THIRD TRIMESTER (HCC): Status: RESOLVED | Noted: 2021-12-25 | Resolved: 2022-03-09

## 2022-03-14 RX ORDER — LABETALOL 200 MG/1
400 TABLET, FILM COATED ORAL 2 TIMES DAILY
Qty: 120 TABLET | Refills: 0 | Status: SHIPPED | OUTPATIENT
Start: 2022-03-14 | End: 2022-04-15

## 2022-04-01 ENCOUNTER — OFFICE VISIT (OUTPATIENT)
Dept: FAMILY MEDICINE CLINIC | Facility: CLINIC | Age: 42
End: 2022-04-01
Payer: COMMERCIAL

## 2022-04-01 DIAGNOSIS — F41.9 ANXIETY: ICD-10-CM

## 2022-04-01 DIAGNOSIS — I10 ESSENTIAL HYPERTENSION: Primary | ICD-10-CM

## 2022-04-01 DIAGNOSIS — F32.A DEPRESSIVE DISORDER: ICD-10-CM

## 2022-04-01 PROCEDURE — 3008F BODY MASS INDEX DOCD: CPT | Performed by: FAMILY MEDICINE

## 2022-04-01 PROCEDURE — 3074F SYST BP LT 130 MM HG: CPT | Performed by: FAMILY MEDICINE

## 2022-04-01 PROCEDURE — 3079F DIAST BP 80-89 MM HG: CPT | Performed by: FAMILY MEDICINE

## 2022-04-01 PROCEDURE — 99214 OFFICE O/P EST MOD 30 MIN: CPT | Performed by: FAMILY MEDICINE

## 2022-04-02 VITALS
HEIGHT: 62 IN | SYSTOLIC BLOOD PRESSURE: 122 MMHG | HEART RATE: 84 BPM | BODY MASS INDEX: 48.76 KG/M2 | OXYGEN SATURATION: 98 % | DIASTOLIC BLOOD PRESSURE: 82 MMHG | WEIGHT: 265 LBS

## 2022-04-15 RX ORDER — LABETALOL 200 MG/1
400 TABLET, FILM COATED ORAL 2 TIMES DAILY
Qty: 120 TABLET | Refills: 1 | Status: SHIPPED | OUTPATIENT
Start: 2022-04-15 | End: 2022-06-19

## 2022-04-15 NOTE — TELEPHONE ENCOUNTER
Sent mychart seeking clarification on who prescribes Labetalol as Dr. Mihai Lugo last prescribed for patient.

## 2022-04-15 NOTE — TELEPHONE ENCOUNTER
Refill passed per [employer], [facility] protocol. Requested Prescriptions   Pending Prescriptions Disp Refills    LABETALOL 200 MG Oral Tab [Pharmacy Med Name: LABETALOL  MG TABLET] 120 tablet 1     Sig: TAKE 2 TABLETS BY MOUTH 2 TIMES DAILY.         Hypertensive Medications Protocol Passed - 4/15/2022  2:18 PM        Passed - CMP or BMP in past 12 months        Passed - Appointment in past 6 or next 3 months        Passed - GFR Non- > 50     Lab Results   Component Value Date    GFRNAA 86 2022                     Recent Outpatient Visits              2 weeks ago Essential hypertension    [address], [place], [facility] 183 Jameson Santos DO    Office Visit    1 month ago Physical exam, annual    [facility] Jameson Santos DO    Office Visit    2 months ago Postpartum care following  delivery    [address] [place] [address] [facility] Jarett Heath MD    Postpartum    3 months ago Postop check    [address] [place] [address], Johnnie Burrell MD    Postpartum    3 months ago Unspecified  screening    Silvina Hickey MD    Routine Prenatal          Future Appointments         Provider Department Appt Notes    In 2 weeks Gemma Buitrago [facility] HT  *policy informed

## 2022-04-27 ENCOUNTER — TELEPHONE (OUTPATIENT)
Dept: OBGYN CLINIC | Facility: CLINIC | Age: 42
End: 2022-04-27

## 2022-04-27 NOTE — TELEPHONE ENCOUNTER
Called pt and has Anya breast pump which is leaking. Pt requesting referral for breast pump, informed to contact company for new pump. Pt insist on referral informed will place but may be denied. Called [employer] informed of pt's request states they will call pt but insurances does not allow for 2 nd pump. Spoke with Smooth Tiwari and will call for [facility] ARIANNE approval but may be denied due to pump provided in Dec. 2021.

## 2022-04-28 NOTE — TELEPHONE ENCOUNTER
Called pt informed referral placed but pending approval.  Encouraged to call insurance to expedite approval, pt agrees.

## 2022-05-02 ENCOUNTER — OFFICE VISIT (OUTPATIENT)
Dept: AUDIOLOGY | Facility: CLINIC | Age: 42
End: 2022-05-02
Payer: COMMERCIAL

## 2022-05-02 DIAGNOSIS — H90.72 MIXED CONDUCTIVE AND SENSORINEURAL HEARING LOSS OF LEFT EAR WITH UNRESTRICTED HEARING OF RIGHT EAR: Primary | ICD-10-CM

## 2022-05-02 NOTE — PROGRESS NOTES
AUDIOGRAM     Yin Harris was referred for testing by Stanford Marques. 3/23/1980  [id number]        History    Ms. Fredo Avalos is here today for hearing testing,  She notes she has had trouble hearing in her left ear for over 10 years. She always feels moisture or drainage from that ear    She denies any ringing or family history of hearing loss    History is positive for occupational noise exposure ( working [employer]) for over 20 years    Otoscopic inspection: right ear no cerumen; left ear no cerumen. Tests/Procedures  Patient was tested via  standard insert earphones and a bone oscillator standard audiometry     Audiometric Results (Pure Tone Results)    Audiometric thresholds indicate normal hearing sensitivity for the right ear  The left ear exhibits a mild mixed hearing loss     Speech Testing  Speech Reception Threshold (SRT). The lowest level at which speech is first recognized  Speech reception threshold of 15dB was recorded in the right ear  Speech reception threshold of 25dB was recorded in the left ear   These results are in good agreement with the aforementioned pure tone findings    Word Recognition Score  A word recognition score of 100% was recorded in the right ear  A word recognition score of 100% was recorded in the left ear   These results suggest excellent understanding ability in both ears at a normal loudness level. IMMITANCE TESTING        Classification: Right:  Type A: normal tympanogram  Left:  Type C: retracted tympanogram/shalow compliance  Ear canal volume: Right 1.7 mL, Left 1.5 mL  Peak middle ear pressure: Right -24 daP, Left: -308 daP  Static compliance: Right . 61 mL, Left . 16 mL      Tympanometry yielded normal middle ear pressure, compliance and ear canal volumes in the right ear. Tympanometry yielded a negative middle ear pressure -308 daPA in the left ear. 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  Audiological monitoring as needed during the course of medical management.   Otologic evaluation re: tympanometric findings and mixed hearing loss in the left     5/2/2022  Gemma Amaro

## 2022-05-20 RX ORDER — LABETALOL 200 MG/1
TABLET, FILM COATED ORAL
Qty: 120 TABLET | Refills: 1 | OUTPATIENT
Start: 2022-05-20

## 2022-06-19 RX ORDER — LABETALOL 200 MG/1
400 TABLET, FILM COATED ORAL 2 TIMES DAILY
Qty: 240 TABLET | Refills: 0 | Status: SHIPPED | OUTPATIENT
Start: 2022-06-19 | End: 2022-07-22

## 2022-07-12 ENCOUNTER — OFFICE VISIT (OUTPATIENT)
Dept: FAMILY MEDICINE CLINIC | Facility: CLINIC | Age: 42
End: 2022-07-12
Payer: COMMERCIAL

## 2022-07-12 VITALS
BODY MASS INDEX: 50.42 KG/M2 | HEART RATE: 78 BPM | SYSTOLIC BLOOD PRESSURE: 136 MMHG | WEIGHT: 274 LBS | OXYGEN SATURATION: 98 % | DIASTOLIC BLOOD PRESSURE: 82 MMHG | HEIGHT: 62 IN

## 2022-07-12 DIAGNOSIS — M54.12 CERVICAL RADICULOPATHY: Primary | ICD-10-CM

## 2022-07-12 DIAGNOSIS — E66.01 MORBID OBESITY (HCC): ICD-10-CM

## 2022-07-12 PROCEDURE — 3008F BODY MASS INDEX DOCD: CPT | Performed by: FAMILY MEDICINE

## 2022-07-12 PROCEDURE — 99214 OFFICE O/P EST MOD 30 MIN: CPT | Performed by: FAMILY MEDICINE

## 2022-07-12 PROCEDURE — 3079F DIAST BP 80-89 MM HG: CPT | Performed by: FAMILY MEDICINE

## 2022-07-12 PROCEDURE — 3075F SYST BP GE 130 - 139MM HG: CPT | Performed by: FAMILY MEDICINE

## 2022-07-22 RX ORDER — LABETALOL 200 MG/1
TABLET, FILM COATED ORAL
Qty: 120 TABLET | Refills: 1 | Status: SHIPPED | OUTPATIENT
Start: 2022-07-22

## 2022-08-04 ENCOUNTER — PATIENT MESSAGE (OUTPATIENT)
Dept: FAMILY MEDICINE CLINIC | Facility: CLINIC | Age: 42
End: 2022-08-04

## 2022-08-04 NOTE — TELEPHONE ENCOUNTER
Dr. Camelia Vega, please advise. Writer unsure what to pend under. ----- Message -----  From: Stephanie Hilton  Sent: 8/4/2022   8:37 AM CDT  To: Em Rn Triage  Subject: Ciaran Vega,  I forgot to ask for a referral at my last appointment. Is it possible to get a referral for counseling with Bandar Cabrera [phone]. She was a martial counselor we have seen in the past and want to start up our counseling again.      Thanks,  [place] Reasons

## 2022-08-12 ENCOUNTER — TELEPHONE (OUTPATIENT)
Dept: FAMILY MEDICINE CLINIC | Facility: CLINIC | Age: 42
End: 2022-08-12

## 2022-08-12 NOTE — TELEPHONE ENCOUNTER
Called and left message to notify that the she has 12 authorized visits to see [facility] PT department.

## 2022-08-17 RX ORDER — LABETALOL 200 MG/1
TABLET, FILM COATED ORAL
Qty: 120 TABLET | Refills: 1 | Status: SHIPPED | OUTPATIENT
Start: 2022-08-17

## 2022-10-06 ENCOUNTER — APPOINTMENT (OUTPATIENT)
Dept: ULTRASOUND IMAGING | Facility: HOSPITAL | Age: 42
End: 2022-10-06
Attending: EMERGENCY MEDICINE
Payer: COMMERCIAL

## 2022-10-06 ENCOUNTER — NURSE TRIAGE (OUTPATIENT)
Dept: FAMILY MEDICINE CLINIC | Facility: CLINIC | Age: 42
End: 2022-10-06

## 2022-10-06 ENCOUNTER — HOSPITAL ENCOUNTER (EMERGENCY)
Facility: HOSPITAL | Age: 42
Discharge: HOME | End: 2022-10-06
Attending: EMERGENCY MEDICINE
Payer: COMMERCIAL

## 2022-10-06 VITALS
HEART RATE: 76 BPM | SYSTOLIC BLOOD PRESSURE: 146 MMHG | OXYGEN SATURATION: 99 % | HEIGHT: 62 IN | WEIGHT: 275 LBS | DIASTOLIC BLOOD PRESSURE: 78 MMHG | BODY MASS INDEX: 50.61 KG/M2 | RESPIRATION RATE: 20 BRPM | TEMPERATURE: 97 F

## 2022-10-06 DIAGNOSIS — M25.562 ACUTE PAIN OF LEFT KNEE: Primary | ICD-10-CM

## 2022-10-06 DIAGNOSIS — M25.462 KNEE EFFUSION, LEFT: ICD-10-CM

## 2022-10-06 PROCEDURE — 99284 EMERGENCY DEPT VISIT MOD MDM: CPT

## 2022-10-06 PROCEDURE — 93971 EXTREMITY STUDY: CPT | Performed by: EMERGENCY MEDICINE

## 2022-10-06 RX ORDER — KETOROLAC TROMETHAMINE 10 MG/1
10 TABLET, FILM COATED ORAL EVERY 6 HOURS PRN
Qty: 20 TABLET | Refills: 0 | Status: SHIPPED | OUTPATIENT
Start: 2022-10-06 | End: 2022-10-11

## 2022-10-06 NOTE — ED INITIAL ASSESSMENT (HPI)
Pt presents with left leg swelling BTK starting last night/this morning. Denies OSMAN or CP. Denies long travel recently.  Hx DVT 2021

## 2022-10-13 RX ORDER — LABETALOL 200 MG/1
400 TABLET, FILM COATED ORAL 2 TIMES DAILY
Qty: 360 TABLET | Refills: 1 | Status: SHIPPED | OUTPATIENT
Start: 2022-10-13 | End: 2023-01-11

## 2022-10-13 NOTE — TELEPHONE ENCOUNTER
Please review refill protocol failed/ no protocol  Requested Prescriptions   Pending Prescriptions Disp Refills    LABETALOL 200 MG Oral Tab [Pharmacy Med Name: LABETALOL  MG TABLET] 120 tablet 1     Sig: TAKE 2 TABLETS BY MOUTH TWICE A DAY        Hypertensive Medications Protocol Failed - 10/12/2022 10:34 AM        Failed - Last BP reading less than 140/90     BP Readings from Last 1 Encounters:  10/06/22 : 146/78                Failed - CMP or BMP in past 6 months     No results found for this or any previous visit (from the past 4392 hour(s)).               Passed - In person appointment in the past 12 or next 3 months       Recent Outpatient Visits              3 months ago Cervical radiculopathy    [address] [place], [place]    Office Visit    5 months ago Mixed conductive and sensorineural hearing loss of left ear with unrestricted hearing of right ear    [facility] Gemma Liriano    Office Visit    6 months ago Essential hypertension    [address] , [facility] [place], [place]    Office Visit    7 months ago Physical exam, annual    [address] [facility],  Leonid Chuy Morales DO    Office Visit    8 months ago Postpartum care following  delivery    [address] , [facility], [place] [facility] Kostas Patrick MD    Postpartum     Future Appointments         Provider Department Appt Notes    In 2 months Parker Lucio MD [address], [address], [employer] loss               Passed - In person appointment or virtual visit in the past 6 months       Recent Outpatient Visits              3 months ago Cervical radiculopathy    [address] [facility] Akil [place], [place]    Office Visit    5 months ago Mixed conductive and sensorineural hearing loss of left ear with unrestricted hearing of right ear    [employer] [address] [employer] [facility] Audiology Gemma Morel    Office Visit    6 months ago Essential hypertension    [employer], [facility], [place], [place]    Office Visit    7 months ago Physical exam, annual     [facility],[address] Floor, [facility], [place] Chuy Morales,     Office Visit    8 months ago Postpartum care following  delivery    Salvatore Peng MD    Postpartum     Future Appointments         Provider Department Appt Notes    In 2 months Mini Malin MD  [facility],[address] Floor, [place] Weight loss               Passed - [facility] or GFRNAA > 50     GFR Evaluation  GFRNAA: 86 , resulted on 2022

## 2022-12-11 LAB — AMB EXT COVID-19 RESULT: DETECTED

## 2022-12-15 ENCOUNTER — TELEPHONE (OUTPATIENT)
Dept: SURGERY | Facility: CLINIC | Age: 42
End: 2022-12-15

## 2022-12-21 ENCOUNTER — OFFICE VISIT (OUTPATIENT)
Dept: SURGERY | Facility: CLINIC | Age: 42
End: 2022-12-21
Payer: COMMERCIAL

## 2022-12-21 VITALS
OXYGEN SATURATION: 97 % | SYSTOLIC BLOOD PRESSURE: 187 MMHG | DIASTOLIC BLOOD PRESSURE: 97 MMHG | HEART RATE: 74 BPM | HEIGHT: 63 IN | WEIGHT: 270.13 LBS | BODY MASS INDEX: 47.86 KG/M2

## 2022-12-21 DIAGNOSIS — I10 HTN (HYPERTENSION), BENIGN: Primary | ICD-10-CM

## 2022-12-21 DIAGNOSIS — R60.0 LOWER EXTREMITY EDEMA: ICD-10-CM

## 2022-12-21 DIAGNOSIS — E55.9 VITAMIN D DEFICIENCY: ICD-10-CM

## 2022-12-21 DIAGNOSIS — R63.5 WEIGHT GAIN: ICD-10-CM

## 2022-12-21 DIAGNOSIS — E66.01 MORBID OBESITY WITH BMI OF 45.0-49.9, ADULT (HCC): ICD-10-CM

## 2022-12-21 DIAGNOSIS — R06.83 SNORING: ICD-10-CM

## 2022-12-21 PROCEDURE — 99204 OFFICE O/P NEW MOD 45 MIN: CPT | Performed by: INTERNAL MEDICINE

## 2022-12-21 PROCEDURE — 3008F BODY MASS INDEX DOCD: CPT | Performed by: INTERNAL MEDICINE

## 2022-12-21 PROCEDURE — 3077F SYST BP >= 140 MM HG: CPT | Performed by: INTERNAL MEDICINE

## 2022-12-21 PROCEDURE — 3080F DIAST BP >= 90 MM HG: CPT | Performed by: INTERNAL MEDICINE

## 2022-12-21 RX ORDER — DIETHYLPROPION HYDROCHLORIDE 25 MG/1
1 TABLET ORAL
Qty: 60 TABLET | Refills: 2 | Status: SHIPPED | OUTPATIENT
Start: 2022-12-21 | End: 2023-01-20

## 2022-12-21 RX ORDER — HYDROCHLOROTHIAZIDE 12.5 MG/1
12.5 CAPSULE, GELATIN COATED ORAL DAILY
Qty: 30 CAPSULE | Refills: 2 | Status: SHIPPED | OUTPATIENT
Start: 2022-12-21

## 2023-01-03 ENCOUNTER — NURSE TRIAGE (OUTPATIENT)
Dept: FAMILY MEDICINE CLINIC | Facility: CLINIC | Age: 43
End: 2023-01-03

## 2023-01-04 ENCOUNTER — TELEMEDICINE (OUTPATIENT)
Facility: CLINIC | Age: 43
End: 2023-01-04
Payer: COMMERCIAL

## 2023-01-04 DIAGNOSIS — J01.90 ACUTE BACTERIAL SINUSITIS: Primary | ICD-10-CM

## 2023-01-04 DIAGNOSIS — B96.89 ACUTE BACTERIAL SINUSITIS: Primary | ICD-10-CM

## 2023-01-04 DIAGNOSIS — I10 ESSENTIAL HYPERTENSION: ICD-10-CM

## 2023-01-04 PROCEDURE — 99213 OFFICE O/P EST LOW 20 MIN: CPT | Performed by: FAMILY MEDICINE

## 2023-01-04 RX ORDER — AMOXICILLIN AND CLAVULANATE POTASSIUM 875; 125 MG/1; MG/1
1 TABLET, FILM COATED ORAL 2 TIMES DAILY
Qty: 20 TABLET | Refills: 0 | Status: SHIPPED | OUTPATIENT
Start: 2023-01-04 | End: 2023-01-14

## 2023-03-09 ENCOUNTER — EKG ENCOUNTER (OUTPATIENT)
Dept: LAB | Age: 43
End: 2023-03-09
Attending: FAMILY MEDICINE
Payer: COMMERCIAL

## 2023-03-09 ENCOUNTER — LAB ENCOUNTER (OUTPATIENT)
Dept: LAB | Age: 43
End: 2023-03-09
Attending: FAMILY MEDICINE
Payer: COMMERCIAL

## 2023-03-09 ENCOUNTER — OFFICE VISIT (OUTPATIENT)
Facility: CLINIC | Age: 43
End: 2023-03-09
Payer: COMMERCIAL

## 2023-03-09 VITALS
OXYGEN SATURATION: 98 % | WEIGHT: 270 LBS | DIASTOLIC BLOOD PRESSURE: 78 MMHG | HEART RATE: 75 BPM | BODY MASS INDEX: 47.84 KG/M2 | HEIGHT: 63 IN | SYSTOLIC BLOOD PRESSURE: 136 MMHG

## 2023-03-09 DIAGNOSIS — E66.01 MORBID OBESITY WITH BMI OF 45.0-49.9, ADULT (HCC): ICD-10-CM

## 2023-03-09 DIAGNOSIS — R06.83 SNORING: ICD-10-CM

## 2023-03-09 DIAGNOSIS — F43.9 STRESS: ICD-10-CM

## 2023-03-09 DIAGNOSIS — R40.0 DAYTIME SLEEPINESS: ICD-10-CM

## 2023-03-09 DIAGNOSIS — R63.5 WEIGHT GAIN: ICD-10-CM

## 2023-03-09 DIAGNOSIS — E55.9 VITAMIN D DEFICIENCY: ICD-10-CM

## 2023-03-09 DIAGNOSIS — R60.0 LOWER EXTREMITY EDEMA: ICD-10-CM

## 2023-03-09 DIAGNOSIS — I10 ESSENTIAL HYPERTENSION: Primary | ICD-10-CM

## 2023-03-09 DIAGNOSIS — I10 HTN (HYPERTENSION), BENIGN: ICD-10-CM

## 2023-03-09 LAB
VIT B12 SERPL-MCNC: 380 PG/ML (ref 193–986)
VIT D+METAB SERPL-MCNC: 13 NG/ML (ref 30–100)

## 2023-03-09 PROCEDURE — 3078F DIAST BP <80 MM HG: CPT | Performed by: FAMILY MEDICINE

## 2023-03-09 PROCEDURE — 82397 CHEMILUMINESCENT ASSAY: CPT

## 2023-03-09 PROCEDURE — 3008F BODY MASS INDEX DOCD: CPT | Performed by: FAMILY MEDICINE

## 2023-03-09 PROCEDURE — 82607 VITAMIN B-12: CPT

## 2023-03-09 PROCEDURE — 3075F SYST BP GE 130 - 139MM HG: CPT | Performed by: FAMILY MEDICINE

## 2023-03-09 PROCEDURE — 99214 OFFICE O/P EST MOD 30 MIN: CPT | Performed by: FAMILY MEDICINE

## 2023-03-09 PROCEDURE — 36415 COLL VENOUS BLD VENIPUNCTURE: CPT

## 2023-03-09 PROCEDURE — 93010 ELECTROCARDIOGRAM REPORT: CPT | Performed by: INTERNAL MEDICINE

## 2023-03-09 PROCEDURE — 82306 VITAMIN D 25 HYDROXY: CPT

## 2023-03-09 PROCEDURE — 93005 ELECTROCARDIOGRAM TRACING: CPT

## 2023-03-09 RX ORDER — LABETALOL 200 MG/1
400 TABLET, FILM COATED ORAL 2 TIMES DAILY
COMMUNITY
Start: 2023-02-01

## 2023-03-09 RX ORDER — HYDROCHLOROTHIAZIDE 25 MG/1
TABLET ORAL
Qty: 90 TABLET | Refills: 1 | Status: SHIPPED | OUTPATIENT
Start: 2023-03-09

## 2023-03-09 NOTE — PATIENT INSTRUCTIONS
As of tomorrow:   -Decrease labetalol to 200mg 2x/day and start hydrochlorothiazide 12.5mg once daily  -Continue the above x7 days. Then:  -Decrease labetalol to 100mg 2x/day and increase hydrochlorothiazide to 25mg once daily.  -Discontinue the labetalol entirely after 7 days of the above regimen.

## 2023-03-10 LAB
ATRIAL RATE: 77 BPM
P AXIS: 37 DEGREES
P-R INTERVAL: 160 MS
Q-T INTERVAL: 400 MS
QRS DURATION: 94 MS
QTC CALCULATION (BEZET): 452 MS
R AXIS: -5 DEGREES
T AXIS: 44 DEGREES
VENTRICULAR RATE: 77 BPM

## 2023-03-13 LAB — LEPTIN: 24.9 NG/ML

## 2023-03-21 DIAGNOSIS — E55.9 VITAMIN D DEFICIENCY: Primary | ICD-10-CM

## 2023-03-21 RX ORDER — ERGOCALCIFEROL 1.25 MG/1
50000 CAPSULE ORAL WEEKLY
Qty: 12 CAPSULE | Refills: 0 | Status: SHIPPED | OUTPATIENT
Start: 2023-03-21 | End: 2023-06-07

## 2023-03-30 ENCOUNTER — OFFICE VISIT (OUTPATIENT)
Dept: SURGERY | Facility: CLINIC | Age: 43
End: 2023-03-30
Payer: COMMERCIAL

## 2023-03-30 VITALS
SYSTOLIC BLOOD PRESSURE: 156 MMHG | BODY MASS INDEX: 48.11 KG/M2 | HEIGHT: 63 IN | HEART RATE: 98 BPM | OXYGEN SATURATION: 98 % | WEIGHT: 271.5 LBS | DIASTOLIC BLOOD PRESSURE: 88 MMHG

## 2023-03-30 DIAGNOSIS — R06.83 SNORING: ICD-10-CM

## 2023-03-30 DIAGNOSIS — R60.0 LOWER EXTREMITY EDEMA: ICD-10-CM

## 2023-03-30 DIAGNOSIS — E66.01 MORBID OBESITY WITH BMI OF 45.0-49.9, ADULT (HCC): ICD-10-CM

## 2023-03-30 DIAGNOSIS — I10 HTN (HYPERTENSION), BENIGN: Primary | ICD-10-CM

## 2023-03-30 PROCEDURE — 3008F BODY MASS INDEX DOCD: CPT | Performed by: INTERNAL MEDICINE

## 2023-03-30 PROCEDURE — 99214 OFFICE O/P EST MOD 30 MIN: CPT | Performed by: INTERNAL MEDICINE

## 2023-03-30 PROCEDURE — 3079F DIAST BP 80-89 MM HG: CPT | Performed by: INTERNAL MEDICINE

## 2023-03-30 PROCEDURE — 3077F SYST BP >= 140 MM HG: CPT | Performed by: INTERNAL MEDICINE

## 2023-03-30 RX ORDER — DIETHYLPROPION HYDROCHLORIDE 25 MG/1
1 TABLET ORAL DAILY
Qty: 30 TABLET | Refills: 2 | Status: SHIPPED | OUTPATIENT
Start: 2023-03-30 | End: 2023-04-29

## 2023-03-30 RX ORDER — TOPIRAMATE 25 MG/1
25 TABLET ORAL EVERY EVENING
Qty: 30 TABLET | Refills: 2 | Status: SHIPPED | OUTPATIENT
Start: 2023-03-30

## 2023-06-14 DIAGNOSIS — I10 HTN (HYPERTENSION), BENIGN: Primary | ICD-10-CM

## 2023-06-14 DIAGNOSIS — E66.01 MORBID OBESITY WITH BMI OF 45.0-49.9, ADULT (HCC): ICD-10-CM

## 2023-06-27 ENCOUNTER — TELEPHONE (OUTPATIENT)
Dept: INTERNAL MEDICINE CLINIC | Facility: CLINIC | Age: 43
End: 2023-06-27

## 2023-07-10 ENCOUNTER — TELEMEDICINE (OUTPATIENT)
Dept: INTERNAL MEDICINE CLINIC | Facility: CLINIC | Age: 43
End: 2023-07-10
Payer: COMMERCIAL

## 2023-07-10 DIAGNOSIS — E66.01 MORBID OBESITY WITH BMI OF 45.0-49.9, ADULT (HCC): ICD-10-CM

## 2023-07-10 DIAGNOSIS — I10 HTN (HYPERTENSION), BENIGN: Primary | ICD-10-CM

## 2023-07-10 NOTE — PATIENT INSTRUCTIONS
Goals:   Water (try True Lemon or Rubin) try adding 1 bottle per day   Adding in 10 minutes of walking per day at work

## 2023-08-09 ENCOUNTER — OFFICE VISIT (OUTPATIENT)
Dept: SURGERY | Facility: CLINIC | Age: 43
End: 2023-08-09
Payer: COMMERCIAL

## 2023-08-09 ENCOUNTER — DOCUMENTATION ONLY (OUTPATIENT)
Dept: SURGERY | Facility: CLINIC | Age: 43
End: 2023-08-09

## 2023-08-09 VITALS
SYSTOLIC BLOOD PRESSURE: 132 MMHG | BODY MASS INDEX: 50.32 KG/M2 | DIASTOLIC BLOOD PRESSURE: 80 MMHG | HEIGHT: 63 IN | HEART RATE: 83 BPM | WEIGHT: 284 LBS | OXYGEN SATURATION: 100 %

## 2023-08-09 DIAGNOSIS — Z51.81 ENCOUNTER FOR THERAPEUTIC DRUG MONITORING: ICD-10-CM

## 2023-08-09 DIAGNOSIS — I10 HTN (HYPERTENSION), BENIGN: ICD-10-CM

## 2023-08-09 DIAGNOSIS — R10.11 RUQ PAIN: ICD-10-CM

## 2023-08-09 DIAGNOSIS — K58.9 IRRITABLE BOWEL SYNDROME, UNSPECIFIED TYPE: Primary | ICD-10-CM

## 2023-08-09 DIAGNOSIS — E66.01 MORBID OBESITY WITH BMI OF 45.0-49.9, ADULT (HCC): ICD-10-CM

## 2023-08-09 NOTE — PROGRESS NOTES
Oriented pt to the bariatric program; provided/reviewed bariatric packet of info, time line, referrals, etc; pt agreed and verbalized understanding; attended seminar on 06/12/2023 stated

## 2023-08-14 ENCOUNTER — OFFICE VISIT (OUTPATIENT)
Dept: SLEEP CENTER | Age: 43
End: 2023-08-14
Attending: FAMILY MEDICINE
Payer: COMMERCIAL

## 2023-08-14 DIAGNOSIS — I10 HYPERTENSION, UNSPECIFIED TYPE: ICD-10-CM

## 2023-08-14 DIAGNOSIS — R40.0 DAYTIME SLEEPINESS: ICD-10-CM

## 2023-08-14 DIAGNOSIS — R06.83 SNORING: ICD-10-CM

## 2023-08-14 PROCEDURE — 95811 POLYSOM 6/>YRS CPAP 4/> PARM: CPT

## 2023-08-16 DIAGNOSIS — G47.33 OSA (OBSTRUCTIVE SLEEP APNEA): Primary | ICD-10-CM

## 2023-08-25 ENCOUNTER — LAB ENCOUNTER (OUTPATIENT)
Dept: LAB | Age: 43
End: 2023-08-25
Attending: FAMILY MEDICINE
Payer: COMMERCIAL

## 2023-08-25 ENCOUNTER — NURSE TRIAGE (OUTPATIENT)
Facility: CLINIC | Age: 43
End: 2023-08-25

## 2023-08-25 ENCOUNTER — OFFICE VISIT (OUTPATIENT)
Facility: CLINIC | Age: 43
End: 2023-08-25
Payer: COMMERCIAL

## 2023-08-25 VITALS
WEIGHT: 278 LBS | HEART RATE: 95 BPM | SYSTOLIC BLOOD PRESSURE: 134 MMHG | DIASTOLIC BLOOD PRESSURE: 86 MMHG | BODY MASS INDEX: 49.26 KG/M2 | OXYGEN SATURATION: 99 % | HEIGHT: 63 IN

## 2023-08-25 DIAGNOSIS — J02.9 SORE THROAT: Primary | ICD-10-CM

## 2023-08-25 DIAGNOSIS — J02.9 SORE THROAT: ICD-10-CM

## 2023-08-25 LAB
BASOPHILS # BLD AUTO: 0.06 X10(3) UL (ref 0–0.2)
BASOPHILS NFR BLD AUTO: 0.7 %
CONTROL LINE PRESENT WITH A CLEAR BACKGROUND (YES/NO): YES YES/NO
DEPRECATED RDW RBC AUTO: 44.2 FL (ref 35.1–46.3)
EOSINOPHIL # BLD AUTO: 0.13 X10(3) UL (ref 0–0.7)
EOSINOPHIL NFR BLD AUTO: 1.6 %
ERYTHROCYTE [DISTWIDTH] IN BLOOD BY AUTOMATED COUNT: 15.3 % (ref 11–15)
HCT VFR BLD AUTO: 38 %
HETEROPH AB SER QL: NEGATIVE
HGB BLD-MCNC: 11.5 G/DL
IMM GRANULOCYTES # BLD AUTO: 0.04 X10(3) UL (ref 0–1)
IMM GRANULOCYTES NFR BLD: 0.5 %
KIT LOT #: 6668 NUMERIC
LYMPHOCYTES # BLD AUTO: 2.07 X10(3) UL (ref 1–4)
LYMPHOCYTES NFR BLD AUTO: 24.8 %
MCH RBC QN AUTO: 24.2 PG (ref 26–34)
MCHC RBC AUTO-ENTMCNC: 30.3 G/DL (ref 31–37)
MCV RBC AUTO: 80 FL
MONOCYTES # BLD AUTO: 0.58 X10(3) UL (ref 0.1–1)
MONOCYTES NFR BLD AUTO: 7 %
NEUTROPHILS # BLD AUTO: 5.46 X10 (3) UL (ref 1.5–7.7)
NEUTROPHILS # BLD AUTO: 5.46 X10(3) UL (ref 1.5–7.7)
NEUTROPHILS NFR BLD AUTO: 65.4 %
PLATELET # BLD AUTO: 259 10(3)UL (ref 150–450)
RBC # BLD AUTO: 4.75 X10(6)UL
STREP GRP A CUL-SCR: NEGATIVE
WBC # BLD AUTO: 8.3 X10(3) UL (ref 4–11)

## 2023-08-25 PROCEDURE — 86308 HETEROPHILE ANTIBODY SCREEN: CPT

## 2023-08-25 PROCEDURE — 87081 CULTURE SCREEN ONLY: CPT | Performed by: FAMILY MEDICINE

## 2023-08-25 PROCEDURE — 85025 COMPLETE CBC W/AUTO DIFF WBC: CPT

## 2023-08-25 PROCEDURE — 87147 CULTURE TYPE IMMUNOLOGIC: CPT | Performed by: FAMILY MEDICINE

## 2023-08-25 PROCEDURE — 36415 COLL VENOUS BLD VENIPUNCTURE: CPT

## 2023-08-25 RX ORDER — PREDNISONE 20 MG/1
40 TABLET ORAL DAILY
Qty: 6 TABLET | Refills: 0 | Status: SHIPPED | OUTPATIENT
Start: 2023-08-25 | End: 2023-08-28

## 2023-08-25 NOTE — TELEPHONE ENCOUNTER
Please reply to pool: EM RN TRIAGE  Action Requested: Summary for Provider     []  Critical Lab, Recommendations Needed  [] Need Additional Advice  [x]   FYI    []   Need Orders  [] Need Medications Sent to Pharmacy  []  Other     SUMMARY: Patient called states she has a sore throat, feels like glass in throat. Denies any fever or cough. She denies drooling. She states she is spitting out saliva because it hurts to swallow. Drinking about 500 ML a day; she was advised to increase fluids to 1-2 L each day to stay adequately hydrated. She is able to open her mouth completely. Denies any airway obstruction. Same day office visit offered --> agreeable and scheduled. Home Care Advice discussed, per protocol. Patient instructed any new or worsening symptoms or seek immediate medical attention--> ED/IC. Patient verbalized understanding. No further questions or concerns at this time.       Future Appointments   Date Time Provider Sis Calhoun   8/25/2023 11:00 AM Nadia López [place] [facility] 14 [facility] EMMG 10 OP       Reason for call: Sore Throat  Onset: Monday      Reason for Disposition   SEVERE sore throat pain    Protocols used: Sore Throat-A-OH

## 2023-08-27 RX ORDER — AMOXICILLIN 500 MG/1
500 CAPSULE ORAL 2 TIMES DAILY
Qty: 20 CAPSULE | Refills: 0 | Status: SHIPPED | OUTPATIENT
Start: 2023-08-27 | End: 2023-09-06

## 2023-09-07 ENCOUNTER — LAB ENCOUNTER (OUTPATIENT)
Dept: LAB | Facility: HOSPITAL | Age: 43
End: 2023-09-07
Attending: SINGLE SPECIALTY
Payer: COMMERCIAL

## 2023-09-07 ENCOUNTER — HOSPITAL ENCOUNTER (OUTPATIENT)
Dept: ULTRASOUND IMAGING | Age: 43
End: 2023-09-07
Attending: SINGLE SPECIALTY
Payer: COMMERCIAL

## 2023-09-07 DIAGNOSIS — I10 HTN (HYPERTENSION), BENIGN: ICD-10-CM

## 2023-09-07 DIAGNOSIS — K58.9 IRRITABLE BOWEL SYNDROME, UNSPECIFIED TYPE: ICD-10-CM

## 2023-09-07 DIAGNOSIS — E66.01 MORBID OBESITY WITH BMI OF 45.0-49.9, ADULT (HCC): ICD-10-CM

## 2023-09-07 DIAGNOSIS — Z51.81 ENCOUNTER FOR THERAPEUTIC DRUG MONITORING: ICD-10-CM

## 2023-09-07 DIAGNOSIS — R10.11 RUQ PAIN: ICD-10-CM

## 2023-09-07 LAB
ALBUMIN SERPL-MCNC: 3.3 G/DL (ref 3.4–5)
ALBUMIN/GLOB SERPL: 0.8 {RATIO} (ref 1–2)
ALP LIVER SERPL-CCNC: 66 U/L
ALT SERPL-CCNC: 19 U/L
ANION GAP SERPL CALC-SCNC: 6 MMOL/L (ref 0–18)
AST SERPL-CCNC: 13 U/L (ref 15–37)
BASOPHILS # BLD AUTO: 0.06 X10(3) UL (ref 0–0.2)
BASOPHILS NFR BLD AUTO: 0.6 %
BILIRUB SERPL-MCNC: 0.2 MG/DL (ref 0.1–2)
BUN BLD-MCNC: 14 MG/DL (ref 7–18)
BUN/CREAT SERPL: 17.5 (ref 10–20)
CALCIUM BLD-MCNC: 8.6 MG/DL (ref 8.5–10.1)
CHLORIDE SERPL-SCNC: 107 MMOL/L (ref 98–112)
CHOLEST SERPL-MCNC: 158 MG/DL (ref ?–200)
CO2 SERPL-SCNC: 29 MMOL/L (ref 21–32)
CREAT BLD-MCNC: 0.8 MG/DL
DEPRECATED HBV CORE AB SER IA-ACNC: 7.2 NG/ML
DEPRECATED RDW RBC AUTO: 43.8 FL (ref 35.1–46.3)
EGFRCR SERPLBLD CKD-EPI 2021: 93 ML/MIN/1.73M2 (ref 60–?)
EOSINOPHIL # BLD AUTO: 0.1 X10(3) UL (ref 0–0.7)
EOSINOPHIL NFR BLD AUTO: 1 %
ERYTHROCYTE [DISTWIDTH] IN BLOOD BY AUTOMATED COUNT: 15.7 % (ref 11–15)
EST. AVERAGE GLUCOSE BLD GHB EST-MCNC: 146 MG/DL (ref 68–126)
FASTING PATIENT LIPID ANSWER: NO
FASTING STATUS PATIENT QL REPORTED: NO
FOLATE SERPL-MCNC: 14.2 NG/ML (ref 8.7–?)
GLOBULIN PLAS-MCNC: 4 G/DL (ref 2.8–4.4)
GLUCOSE BLD-MCNC: 126 MG/DL (ref 70–99)
HBA1C MFR BLD: 6.7 % (ref ?–5.7)
HCT VFR BLD AUTO: 36.5 %
HDLC SERPL-MCNC: 37 MG/DL (ref 40–59)
HGB BLD-MCNC: 11 G/DL
IMM GRANULOCYTES # BLD AUTO: 0.03 X10(3) UL (ref 0–1)
IMM GRANULOCYTES NFR BLD: 0.3 %
IRON SATN MFR SERPL: 6 %
IRON SERPL-MCNC: 31 UG/DL
LDLC SERPL CALC-MCNC: 80 MG/DL (ref ?–100)
LYMPHOCYTES # BLD AUTO: 2.61 X10(3) UL (ref 1–4)
LYMPHOCYTES NFR BLD AUTO: 26 %
MCH RBC QN AUTO: 23.6 PG (ref 26–34)
MCHC RBC AUTO-ENTMCNC: 30.1 G/DL (ref 31–37)
MCV RBC AUTO: 78.2 FL
MONOCYTES # BLD AUTO: 0.52 X10(3) UL (ref 0.1–1)
MONOCYTES NFR BLD AUTO: 5.2 %
NEUTROPHILS # BLD AUTO: 6.71 X10 (3) UL (ref 1.5–7.7)
NEUTROPHILS # BLD AUTO: 6.71 X10(3) UL (ref 1.5–7.7)
NEUTROPHILS NFR BLD AUTO: 66.9 %
NONHDLC SERPL-MCNC: 121 MG/DL (ref ?–130)
OSMOLALITY SERPL CALC.SUM OF ELEC: 296 MOSM/KG (ref 275–295)
PLATELET # BLD AUTO: 260 10(3)UL (ref 150–450)
POTASSIUM SERPL-SCNC: 3.5 MMOL/L (ref 3.5–5.1)
PROT SERPL-MCNC: 7.3 G/DL (ref 6.4–8.2)
RBC # BLD AUTO: 4.67 X10(6)UL
SODIUM SERPL-SCNC: 142 MMOL/L (ref 136–145)
TIBC SERPL-MCNC: 535 UG/DL (ref 240–450)
TRANSFERRIN SERPL-MCNC: 359 MG/DL (ref 200–360)
TRIGL SERPL-MCNC: 245 MG/DL (ref 30–149)
TSI SER-ACNC: 1.36 MIU/ML (ref 0.36–3.74)
VIT B12 SERPL-MCNC: 377 PG/ML (ref 193–986)
VIT D+METAB SERPL-MCNC: 22.2 NG/ML (ref 30–100)
VLDLC SERPL CALC-MCNC: 39 MG/DL (ref 0–30)
WBC # BLD AUTO: 10 X10(3) UL (ref 4–11)

## 2023-09-07 PROCEDURE — 85025 COMPLETE CBC W/AUTO DIFF WBC: CPT

## 2023-09-07 PROCEDURE — 80053 COMPREHEN METABOLIC PANEL: CPT

## 2023-09-07 PROCEDURE — 36415 COLL VENOUS BLD VENIPUNCTURE: CPT

## 2023-09-07 PROCEDURE — 83540 ASSAY OF IRON: CPT

## 2023-09-07 PROCEDURE — 82607 VITAMIN B-12: CPT

## 2023-09-07 PROCEDURE — 82306 VITAMIN D 25 HYDROXY: CPT

## 2023-09-07 PROCEDURE — 82746 ASSAY OF FOLIC ACID SERUM: CPT

## 2023-09-07 PROCEDURE — 84466 ASSAY OF TRANSFERRIN: CPT

## 2023-09-07 PROCEDURE — 84443 ASSAY THYROID STIM HORMONE: CPT

## 2023-09-07 PROCEDURE — 82728 ASSAY OF FERRITIN: CPT

## 2023-09-07 PROCEDURE — 83036 HEMOGLOBIN GLYCOSYLATED A1C: CPT

## 2023-09-07 PROCEDURE — 84425 ASSAY OF VITAMIN B-1: CPT

## 2023-09-07 PROCEDURE — 80061 LIPID PANEL: CPT

## 2023-09-12 LAB — VITAMIN B1 WHOLE BLD: 96.2 NMOL/L

## 2023-09-14 ENCOUNTER — VIRTUAL PHONE E/M (OUTPATIENT)
Dept: SURGERY | Facility: CLINIC | Age: 43
End: 2023-09-14
Payer: COMMERCIAL

## 2023-09-14 VITALS — WEIGHT: 278 LBS | HEIGHT: 63 IN | BODY MASS INDEX: 49.26 KG/M2

## 2023-09-14 DIAGNOSIS — E66.01 MORBID OBESITY WITH BMI OF 45.0-49.9, ADULT (HCC): ICD-10-CM

## 2023-09-14 DIAGNOSIS — I10 HTN (HYPERTENSION), BENIGN: Primary | ICD-10-CM

## 2023-09-14 DIAGNOSIS — Z51.81 ENCOUNTER FOR THERAPEUTIC DRUG MONITORING: ICD-10-CM

## 2023-09-14 DIAGNOSIS — E11.9 TYPE 2 DIABETES MELLITUS WITHOUT COMPLICATION, WITHOUT LONG-TERM CURRENT USE OF INSULIN (HCC): ICD-10-CM

## 2023-09-14 DIAGNOSIS — R60.0 LOWER EXTREMITY EDEMA: ICD-10-CM

## 2023-09-14 DIAGNOSIS — D50.9 IRON DEFICIENCY ANEMIA, UNSPECIFIED IRON DEFICIENCY ANEMIA TYPE: ICD-10-CM

## 2023-09-14 PROCEDURE — 99214 OFFICE O/P EST MOD 30 MIN: CPT | Performed by: INTERNAL MEDICINE

## 2023-09-14 RX ORDER — SEMAGLUTIDE 0.68 MG/ML
0.25 INJECTION, SOLUTION SUBCUTANEOUS WEEKLY
Qty: 3 ML | Refills: 1 | Status: SHIPPED | OUTPATIENT
Start: 2023-09-14 | End: 2023-10-14

## 2023-09-14 RX ORDER — TOPIRAMATE 25 MG/1
25 TABLET ORAL EVERY EVENING
Qty: 30 TABLET | Refills: 2 | Status: SHIPPED | OUTPATIENT
Start: 2023-09-14

## 2023-09-14 RX ORDER — PHENTERMINE HYDROCHLORIDE 15 MG/1
15 CAPSULE ORAL EVERY MORNING
Qty: 30 CAPSULE | Refills: 2 | Status: SHIPPED | OUTPATIENT
Start: 2023-09-14 | End: 2023-09-14

## 2023-09-18 ENCOUNTER — TELEPHONE (OUTPATIENT)
Dept: SURGERY | Facility: CLINIC | Age: 43
End: 2023-09-18

## 2023-09-18 DIAGNOSIS — E11.9 TYPE 2 DIABETES MELLITUS WITHOUT COMPLICATION, WITHOUT LONG-TERM CURRENT USE OF INSULIN (HCC): ICD-10-CM

## 2023-09-18 RX ORDER — HYDROCHLOROTHIAZIDE 12.5 MG/1
12.5 CAPSULE, GELATIN COATED ORAL DAILY
Qty: 30 CAPSULE | Refills: 0 | Status: SHIPPED | OUTPATIENT
Start: 2023-09-18

## 2023-09-19 RX ORDER — SEMAGLUTIDE 0.68 MG/ML
0.25 INJECTION, SOLUTION SUBCUTANEOUS WEEKLY
Qty: 3 ML | Refills: 0 | Status: SHIPPED | OUTPATIENT
Start: 2023-09-19 | End: 2023-10-19

## 2023-09-21 RX ORDER — HYDROCHLOROTHIAZIDE 25 MG/1
TABLET ORAL
Qty: 30 TABLET | Refills: 5 | OUTPATIENT
Start: 2023-09-21

## 2023-10-05 ENCOUNTER — OFFICE VISIT (OUTPATIENT)
Dept: INTERNAL MEDICINE CLINIC | Facility: CLINIC | Age: 43
End: 2023-10-05
Payer: COMMERCIAL

## 2023-10-05 VITALS — HEIGHT: 63 IN | WEIGHT: 288.63 LBS | BODY MASS INDEX: 51.14 KG/M2

## 2023-10-05 DIAGNOSIS — R06.83 SNORING: ICD-10-CM

## 2023-10-05 DIAGNOSIS — E66.01 CLASS 3 SEVERE OBESITY WITH BODY MASS INDEX (BMI) OF 50.0 TO 59.9 IN ADULT, UNSPECIFIED OBESITY TYPE, UNSPECIFIED WHETHER SERIOUS COMORBIDITY PRESENT (HCC): Primary | ICD-10-CM

## 2023-10-05 DIAGNOSIS — I10 HTN (HYPERTENSION), BENIGN: ICD-10-CM

## 2023-10-05 DIAGNOSIS — R60.0 LOWER EXTREMITY EDEMA: ICD-10-CM

## 2023-10-05 DIAGNOSIS — E28.2 PCOS (POLYCYSTIC OVARIAN SYNDROME): ICD-10-CM

## 2023-10-05 PROCEDURE — 97802 MEDICAL NUTRITION INDIV IN: CPT

## 2023-10-05 PROCEDURE — 3008F BODY MASS INDEX DOCD: CPT

## 2023-10-05 NOTE — PATIENT INSTRUCTIONS
Goals: 1. Keep a food record, My Net Diary, select the macros dashboard. 2.  Strive to consume at least 4-6 meals/snacks per day. Include protein and produce when you eat. Aim for 52-63 grams of protein per day. Try to keep the carbohydrates at 120 grams per day or less. 3.  Practice eating strategies, eat separately from drinking, avoid straws, chew food 20-30 times before swallowing. Make the meals last 30 minutes. 4.  Aim for 64 oz per day of water. (Try protein water, adding True Lemon, Crystal Light). 5.  Taper caffeine and carbonation. 6.  Exercise with a goal of 30 minutes per day for exercise (for example,walking). 7.  Strength training 10 minutes 3 days per week.    (7 minute workout song) or (Gym Rat no more-18 at home exercises) or Earle 10 minute workout on [employer]

## 2023-10-05 NOTE — PROGRESS NOTES
[facility]  [address] [place] [id number]  Dept: [phone]  Loc: [phone]    10/05/23    Bariatric Initial Nutrition Assessment    Mando Carpio is a 39year old female. Referring Physician: Janette Cruz  Reason for MNT Referral: Initial assessment for: Vertical Sleeve Gastrectomy      Assessment   Medical Diagnosis:  obesity grade III, PCOS, KELECHI , YAMILE waiting for Cpap    Patient Active Problem List:     PCOS (polycystic ovarian syndrome)     Obesity in pregnancy     IBS (irritable bowel syndrome)     DVT (deep vein thrombosis) in pregnancy     Postpartum care following  delivery     HTN (hypertension), benign     Morbid obesity with BMI of 45.0-49.9, adult ([facility]     Weight gain     Lower extremity edema     Snoring     Encounter for therapeutic drug monitoring      Past Medical History:   Past Medical History:   Diagnosis Date    Allergic rhinitis 2019    Anxiety     DVT (deep vein thrombosis) in pregnancy 2021    Encounter for therapeutic drug monitoring 2023    Essential hypertension     Gestational diabetes     High myopia 1986    History of IBS     History of PCOS     Hypertension     Infertility, female     Morbid obesity with BMI of 45.0-49.9, adult ([facility]     Obesity     PTSD (post-traumatic stress disorder)        Weight history:  Struggled with weight  most of adult life, Pt's highest adult weight 288.6 lbs at 40 y/o, Pt's lowest adult weight 200 lbs at 20 y/o, and Reason given for weight gain:  Struggled with weight most of his/her life, Pregnancy: did IVF for 10 years, had 2pregnancies, and Emotional, stress eating    Patient has tried: High protein, Low carbohydrate, Low fat, Calorie counting : on own, [place], [place], Ana Fast, [place], [employer], Priscille Section, and Other: Keto, Atkins, more vegetarian , SlimFast, Isogenics    Patient's most successful weight loss attempt was .  Lost 10-20 lbs  and kept it off 24 months. Patient has tried these medications for weight loss: Other: metformin, topamax    Patient has received these behavioral treatments for weight loss: None    Patient is being followed by Dr. Sabiha Ko for medical weight loss. Patient has completed medical weight management No    Patient has support from: Family, Primary care physician, and Friends    Patient acknowledges binge eating behavior: Eats until uncomfortably full. and Eats large amounts of food when not physically hungry. Patient engages in binge-purge behavior: no    Patient uses laxatives or vomits as a means of purging: no    Patient complains of: diarrhea heart burn flatuence abdominal pain abdominal cramping    Patient's motivation for bariatric surgery: \"I have 2 very small kids ages 3 and 3 y.o., I have to live small enough to raise them. \"    Allergies:    Lisinopril              Coughing    Height:  Ht Readings from Last 1 Encounters:  09/14/23 : 5' 3\" (1.6 m)      Weight:  Wt Readings from Last 6 Encounters:  09/14/23 : 278 lb  08/25/23 : 278 lb  08/09/23 : 284 lb  03/30/23 : 271 lb 8 oz  03/09/23 : 270 lb  12/21/22 : 270 lb 1.6 oz      IBW:  Patient weight not recorded  BMI: There is no height or weight on file to calculate BMI.  Obesity Grade III, greater than or equal to 40    Diet Rx: calorie reduction    Labs:    Lab Results   Component Value Date     (H) 09/07/2023    BUN 14 09/07/2023    BUNCREA 17.5 09/07/2023    CREATSERUM 0.80 09/07/2023    ANIONGAP 6 09/07/2023    GFRNAA 86 02/28/2022    GFRAA 99 02/28/2022    CA 8.6 09/07/2023    OSMOCALC 296 (H) 09/07/2023    ALKPHO 66 09/07/2023    AST 13 (L) 09/07/2023    ALT 19 09/07/2023    BILT 0.2 09/07/2023    TP 7.3 09/07/2023    ALB 3.3 (L) 09/07/2023    GLOBULIN 4.0 09/07/2023     09/07/2023    K 3.5 09/07/2023     09/07/2023    CO2 29.0 09/07/2023     No results found for: \"MG\"  No results found for: \"PHOS\"    Thyroid:    Lab Results   Component Value Date    TSH 1.360 09/07/2023    TSH 1.010 09/22/2021       Iron Panel:   Lab Results   Component Value Date    IRON 31 (L) 09/07/2023    TRANSFERRIN 359 09/07/2023    TIBCP 535 (H) 09/07/2023    SAT 6 (L) 09/07/2023       Diabetes:    Lab Results   Component Value Date     (H) 09/07/2023    A1C 6.7 (H) 09/07/2023       Lipid Panel:   Lab Results   Component Value Date    CHOLEST 158 09/07/2023    TRIG 245 (H) 09/07/2023    HDL 37 (L) 09/07/2023    LDL 80 09/07/2023    VLDL 39 (H) 09/07/2023    [place] 121 09/07/2023       Vitamins/Minerals:  Lab Results   Component Value Date    B12 377 09/07/2023     Lab Results   Component Value Date    VITD 22.2 (L) 09/07/2023     No results found for: \"THIAMINE\"   Lab Results   Component Value Date/Time    VITB1 96.2 09/07/2023 03:17 PM     Lab Results   Component Value Date/Time    FOLIC 37.0 66/15/2649 58:36 PM       Relevant Meds:      Current Outpatient Medications:     semaglutide (OZEMPIC, 0.25 OR 0.5 MG/DOSE,) 2 MG/3ML Subcutaneous Solution Pen-injector, Inject 0.25 mg into the skin once a week., Disp: 3 mL, Rfl: 0    HYDROCHLOROTHIAZIDE 12.5 MG Oral Cap, TAKE 1 CAPSULE BY MOUTH EVERY DAY, Disp: 30 capsule, Rfl: 0    topiramate 25 MG Oral Tab, Take 1 tablet (25 mg total) by mouth every evening., Disp: 30 tablet, Rfl: 2    Ferrous Sulfate (IRON) 325 (65 Fe) MG Oral Tab, Take 1 tablet by mouth daily. , Disp: 30 tablet, Rfl: 2    ergocalciferol 1.25 MG (88702 UT) Oral Cap, Take 1 capsule (50,000 Units total) by mouth once a week., Disp: 4 capsule, Rfl: 2    hydroCHLOROthiazide 25 MG Oral Tab, Take 1/2 tablet PO daily x7 days, then 1 tablet PO daily thereafter, Disp: 90 tablet, Rfl: 1    Vitamins/Minerals: Adult MVI, Iron, Vit B12, and Vit D  Food Intolerances: lactose and beers with hops in them  Food Allergies:  nkfa  Smoker:     Smoking status:   Former      Smokeless tobacco:   Never       Alcohol Intake:    Alcohol use   No     Drugs: Drug use:   No       Estimated Kcal Needs (Zondra Syriac): 1900 kcal/day   Estimated Protein Needs:  52-63 grams/day  Estimated Fluid Needs: Aim for 64 ozs per day    Diet history:       Breakfast      AM Snack       Lunch     PM Snack     Dinner Evening Snack   Regular coffee with cream swirl 1 cup and 2-3 cups per day Half sandwich breakfast from diane gerardo or lucycks or tamale FF cream of chicken soup with half buffalo chicken sandwich or tamale and rice or chinese food or chicken wings Chips or granola balls or belvita snack  Spaghetti with meat sauce and grated cheese and corn or rice and chicken or tacos or eggplant parmesan or soup         Part of dinner or madelines or brownie bites   Meal pattern consists of 2-3 meals, 2 snacks and 0 protein supplements. Total Kcal: unsure  Excessive in: calories, simple sugars, fast foods, and other: carbs  Inadequate in:  protein, fruits, vegetables, and fiber   Trigger Foods: rice dishes and pizza  Patient currently consumes caffeine: more often than 3 times/week  Patient currently consumes soda: more often than 3 times/week    Activity Level: minimal  Type: other: running after kids  Duration: 5 minutes  Frequency: per day    Other:  Met with pt for initial visit. Pt is being followed by Dr. Malachi Vela for medical weight management. Per pt is taking ozempic and feels full faster, often does not finish meals. Pt is pursuing VSG with Dr. Shelbi Garnett. Pt with h/o excess weight beginning in teenage years. Per pt has tried many diets in the past including but not limited to High protein, Low carbohydrate, Low fat, Calorie counting : on own, [place], [place], Ana Fast, [place], [employer], Janet Ogren, Keto, Atkins, and more vegetarian type diet. Per pt has been able to lose weight but nothing sustainable. Per pt struggles with some binge eating behaviors. Reviewed program binder. Answered pt questions.   Pt with follow up visit and body comp scheduled or next month. Nutrition Diagnosis: Morbid obesity: Improvement shown    Intervention     1. Nutrition Rx:  Reviewed  3 meal plan, Discussed portion control, Reviewed Bariatric Diet Stages 1-4, Discussed goals, and Obtain MVI  2. Coordination of care: attend support group prior to surgery; reminder for importance of multidisciplinary consultations. 3.  Materials given: [facility] Bariatric Manual and Goals Handout    Goals:     1. Keep a food record, My Net Diary, select the macros dashboard. 2.  Strive to consume at least 4-6 meals/snacks per day. Include protein and produce when you eat. Aim for 52-63 grams of protein per day. Try to keep the carbohydrates at 120 grams per day or less. 3.  Practice eating strategies, eat separately from drinking, avoid straws, chew food 20-30 times before swallowing. Make the meals last 30 minutes. 4.  Aim for 64 oz per day of water. (Try protein water, adding True Lemon, Crystal Light). 5.  Taper caffeine and carbonation. 6.  Exercise with a goal of 30 minutes per day for exercise (for example,walking). 7.  Strength training 10 minutes 3 days per week. (7 minute workout song) or (Gym Rat no more-18 at home exercises) or [employer] 10 minute workout on [place]    Monitor/Evaluate     Food/fluid intake/choices  Nutrition Rx  Anthropometric measurements  Body composition-InBody Testing at next appointment per surgeon recommendation  Updated labs  F/U MD plan of care  F/U to reinforce goals  F/U on vitamin/mineral supplementation  Review quizzes   for liquid protein diet prior to surgery      Additional RD visits required to review concepts? yes  Patient understands protein requirements? yes  Patient understand fluid requirements (amount and method of intake)? yes  Patient understands post-operative diet? yes  Patient ready for Liquid Protein Education?  no    Pam Goldman, RD, LDN  Face-to-face time spent with pt: 75 minutes

## 2023-10-09 ENCOUNTER — HOSPITAL ENCOUNTER (OUTPATIENT)
Dept: ULTRASOUND IMAGING | Facility: HOSPITAL | Age: 43
Discharge: HOME | End: 2023-10-09
Attending: SINGLE SPECIALTY
Payer: COMMERCIAL

## 2023-10-09 DIAGNOSIS — I10 HTN (HYPERTENSION), BENIGN: ICD-10-CM

## 2023-10-09 DIAGNOSIS — Z51.81 ENCOUNTER FOR THERAPEUTIC DRUG MONITORING: ICD-10-CM

## 2023-10-09 DIAGNOSIS — R10.11 RUQ PAIN: ICD-10-CM

## 2023-10-09 DIAGNOSIS — E66.01 MORBID OBESITY WITH BMI OF 45.0-49.9, ADULT (HCC): ICD-10-CM

## 2023-10-09 DIAGNOSIS — K58.9 IRRITABLE BOWEL SYNDROME, UNSPECIFIED TYPE: ICD-10-CM

## 2023-10-09 PROCEDURE — 76705 ECHO EXAM OF ABDOMEN: CPT | Performed by: SINGLE SPECIALTY

## 2023-10-26 RX ORDER — HYDROCHLOROTHIAZIDE 25 MG/1
25 TABLET ORAL DAILY
Qty: 90 TABLET | Refills: 0 | Status: SHIPPED | OUTPATIENT
Start: 2023-10-26

## 2023-10-26 NOTE — TELEPHONE ENCOUNTER
Refill passed per [employer], [employer] protocol.   Requested Prescriptions   Pending Prescriptions Disp Refills    HYDROCHLOROTHIAZIDE 25 MG Oral Tab [Pharmacy Med Name: HYDROCHLOROTHIAZIDE 25 MG TAB] 30 tablet 5     Sig: TAKE 1/2 TABLET BY MOUTH DAILY FOR 7 DAYS, THEN 1 TABLET BY MOUTH DAILY THEREAFTER       Hypertensive Medications Protocol Passed - 10/25/2023  3:04 PM        Passed - In person appointment in the past 12 or next 3 months     Recent Outpatient Visits              3 weeks ago Class 3 severe obesity with body mass index (BMI) of 50.0 to 59.9 in adult, unspecified obesity type, unspecified whether serious comorbidity present ([facility] [E66.01, Z68.43]    [facility] Cher[address]    Office Visit    1 month ago HTN (hypertension), benign    Deepthi Tavarez MD    [facility]    2 months ago Sore throat    [address], [address] [facility], [address] [facility]    2 months ago Hypertension, unspecified type    [facility]    Office Visit    2 months ago Irritable bowel syndrome, unspecified type    [address] [facility], [place], Jaclyn Isaac MD    Office Visit          Future Appointments         Provider Department Appt Notes    In 1 week Taras Khan, [address], [address], [facility]     In 3 weeks Kendal Ny MD [address], [address], [place] Irritable bowel syndrome, unspecified type, policy informed    In 1 month Kristin Olvera MD [address], [address], [place] Follow up    In 1 month [address] Sailaja Blount DO [address], [address], [facility]                      Passed - Last BP reading less than 140/90     BP Readings from Last 1 Encounters:  08/25/23 : 134/86              Passed - CMP or BMP in past 6 months     Recent Results (from the past 4392 hour(s))   Comp Metabolic Panel (14)    Collection Time: 09/07/23  3:17 PM   Result Value Ref Range    Glucose 126 (H) 70 - 99 mg/dL    Sodium 142 136 - 145 mmol/L    Potassium 3.5 3.5 - 5.1 mmol/L    Chloride 107 98 - 112 mmol/L    CO2 29.0 21.0 - 32.0 mmol/L    Anion Gap 6 0 - 18 mmol/L    BUN 14 7 - 18 mg/dL    Creatinine 0.80 0.55 - 1.02 mg/dL    BUN/CREA Ratio 17.5 10.0 - 20.0    Calcium, Total 8.6 8.5 - 10.1 mg/dL    Calculated Osmolality 296 (H) 275 - 295 mOsm/kg    eGFR-Cr 93 >=60 mL/min/1.73m2    ALT 19 13 - 56 U/L    AST 13 (L) 15 - 37 U/L    Alkaline Phosphatase 66 37 - 98 U/L    Bilirubin, Total 0.2 0.1 - 2.0 mg/dL    Total Protein 7.3 6.4 - 8.2 g/dL    Albumin 3.3 (L) 3.4 - 5.0 g/dL    Globulin  4.0 2.8 - 4.4 g/dL    A/G Ratio 0.8 (L) 1.0 - 2.0    Patient Fasting for CMP? No      *Note: Due to a large number of results and/or encounters for the requested time period, some results have not been displayed. A complete set of results can be found in Results Review.                Passed - In person appointment or virtual visit in the past 6 months     Recent Outpatient Visits              3 weeks ago Class 3 severe obesity with body mass index (BMI) of 50.0 to 59.9 in adult, unspecified obesity type, unspecified whether serious comorbidity present ([facility] [E66.01, Z68.43]    [facility], [facility], Sammy Jernigan RD    Office Visit    1 month ago HTN (hypertension), benign    Carolyn Oliveira MD    [facility]    2 months ago Sore throat    Gonzales Amaya, [address], [place]    Office Visit    2 months ago Hypertension, unspecified type    [facility]    Office Visit    2 months ago Irritable bowel syndrome, unspecified type    Jess Noyola, [address], Senthil Bustamante MD    Office Visit          Future Appointments Provider Department Appt Notes    In 1 week Gabriel Mac, [address], [address]     In 3 weeks Jordan Owens MD [address], [address], [place] Irritable bowel syndrome, unspecified type, policy informed    In 1 month Parker Lucio MD [address], [address], [place] Follow up    In 1 month [address] Blount,  [address], [address], [address] [facility]  or GFRNAA > 50     GFR Evaluation  EGFRCR: 93 , resulted on 9/7/2023             Recent Outpatient Visits              3 weeks ago Class 3 severe obesity with body mass index (BMI) of 50.0 to 59.9 in adult, unspecified obesity type, unspecified whether serious comorbidity present ([facility] [E66.01, Z68.43]    [address], [address] [facility], Juvencio Hoang RD    Office Visit    1 month ago HTN (hypertension), benign    Lui Lizama MD    [facility]    2 months ago Sore throat    Sussy Bonilla, [address] [place], [place]    Office Visit    2 months ago Hypertension, unspecified type    [facility]    Office Visit    2 months ago Irritable bowel syndrome, unspecified type    Sussy Bonilla, Huy Daniels MD    Office Visit          Future Appointments         Provider Department Appt Notes    In 1 week Gabriel Mac, [address], [address], [facility]     In 3 weeks Jordan Owens MD [address], [address], [place] Irritable bowel syndrome, unspecified type, policy informed    In 1 month Parker Lucio MD [address], [address], [place] Follow up    In 1 month [address] Blount Pilon, DO [address], [address], [employer]

## 2023-10-27 RX ORDER — HYDROCHLOROTHIAZIDE 25 MG/1
TABLET ORAL
Qty: 90 TABLET | Refills: 1 | OUTPATIENT
Start: 2023-10-27

## 2023-11-01 NOTE — PROGRESS NOTES
[facility] ambulatory encounter  URGENT CARE OFFICE VISIT      ASSESSMENT AND PLAN    Plantar fasciitis of right foot  - SERVICE TO PHYSICAL THERAPY  - SERVICE TO PODIATRY  - meloxicam (MOBIC) 7.5 MG tablet; Take 1 tablet by mouth daily as needed for Pain.  Dispense: 10 tablet; Refill: 0  - methylPREDNISolone (MEDROL, ANITA,) 4 MG tablet; Take 1 tablet by mouth as directed. follow package directions  Dispense: 1 packet; Refill: 0      - he wants to follow-up with the [facility] for Podiatry and physical therapy.  - Over the counter tylenol 500mg to 1000mg every 6 hrs as needed for pain  - Ice for 20 min every 4 to 6 hrs for the first 48 hrs then warm compress after.   - Elevation.  - Avoid using affected part.   - F/u with pcp or urgent care after 10 days for re evaluation              All questions were answered accordingly explained to the patient.    The patient was advised to follow up with primary physician or to recheck with the urgent care clinic sooner if symptoms get worse or if new symptoms appear.    Please understand this is a provisional diagnosis that can and may change. The diagnosis that you are discharged with is based on the symptoms you described and the diagnostic information available today. ED precautions discussed with patient    PREVIOUS LABS      - history was taken from: patient     SUBJECTIVE:      Joseph Charlton is a 32 year old male     Chief Complaint   Patient presents with   • Foot Pain     Bilateral foot pain since Thursday; states his right foot is more painful than left.  Pt states he feels as though it is an \"old injury flaring up.\"  Reports hx of plantar fascitis a couple years back.          Patient is being seen here for symptoms as stated above    Bilateral plantar pain in the arch area.  Right greater than the left.  Left 1 is just mild.  Mainly the right plantar heel area.  History of bilateral plantar fascitis.  Right plantar heel area pain started about a week ago.   Paulo Myles     Dear Dr. Jud Collins,     Thank you for requesting ultrasound evaluation and maternal fetal medicine consultation on your patient Brandee Pratt.   As you are aware she is a 42year old female  w No trauma.  No falls.  Worse when putting weight on it.  No fever and chills.    -no fever, no chills,       Review of systems:    A review of systems was performed and findings relevant to these symptoms are included in the HPI.         OBJECTIVE:  Physical Exam:    Visit Vitals  /88   Pulse (!) 102   Temp 98 °F (36.7 °C) (Oral)   Resp 18   Wt 106.6 kg (235 lb)   SpO2 98%   BMI 39.11 kg/m²        CONSTITUTIONAL: Well-hydrated, well-nourished male who appears to be in no acute distress. Awake, alert and cooperative.    Right ankle/foot:  No gross deformity.  No swelling.  No redness.  Tenderness on the right heel area.  Complete range of motion.  Sensory intact    Left ankle/foot:  No gross deformity.  No swelling.  No redness.  Mild tenderness in the left heel area.  Complete range of motion.  Sensory intact        Ulysses Boco MD  [facility]               adequately. There appears to be a structurally  normal fetal heart and rhythm.  The patient was made aware of the limitations of the fetal heart study: malformations such as but not limiting to minor valve , VSD, anomalous pulmonary venus return, or coar such as walking or stationary bike in the pregnancy.       HYPERTENSION  We reviewed the potential implications associated with chronic hypertension, the goals of therapy and the role of low dose ASA. Please seen [facility] note from 9/14/21 for detailed review. malformations compared with fertile women who conceive naturally. Heart defects have been reported as high at 6 % so fetal echocardiography is recommended in all IVF patients.  Stillbirth and  mortality rates appear to be increased as much as four- other complications 79-12 weeks. · Continue Lovenox  · [employer] -14 parental carrier screen was drawn today       Thank you for allowing me to participate in the care of your patient.   Please do not hesitate to contact me if additional questions or

## 2023-11-02 ENCOUNTER — ORDER TRANSCRIPTION (OUTPATIENT)
Dept: ADMINISTRATIVE | Facility: HOSPITAL | Age: 43
End: 2023-11-02

## 2023-11-02 DIAGNOSIS — I10 HTN (HYPERTENSION): Primary | ICD-10-CM

## 2023-11-02 DIAGNOSIS — R94.39 ABNORMAL MYOCARDIAL PERFUSION STUDY: Primary | ICD-10-CM

## 2023-11-02 DIAGNOSIS — Z01.818 PRE-OPERATIVE EXAMINATION: ICD-10-CM

## 2023-11-08 ENCOUNTER — OFFICE VISIT (OUTPATIENT)
Dept: SURGERY | Facility: CLINIC | Age: 43
End: 2023-11-08
Payer: COMMERCIAL

## 2023-11-08 ENCOUNTER — DOCUMENTATION ONLY (OUTPATIENT)
Dept: SURGERY | Facility: CLINIC | Age: 43
End: 2023-11-08

## 2023-11-08 VITALS — BODY MASS INDEX: 50.71 KG/M2 | HEIGHT: 63 IN | WEIGHT: 286.19 LBS

## 2023-11-08 DIAGNOSIS — I10 HTN (HYPERTENSION), BENIGN: ICD-10-CM

## 2023-11-08 DIAGNOSIS — R60.0 LOWER EXTREMITY EDEMA: ICD-10-CM

## 2023-11-08 DIAGNOSIS — E28.2 PCOS (POLYCYSTIC OVARIAN SYNDROME): ICD-10-CM

## 2023-11-08 DIAGNOSIS — E66.01 CLASS 3 SEVERE OBESITY WITH BODY MASS INDEX (BMI) OF 50.0 TO 59.9 IN ADULT, UNSPECIFIED OBESITY TYPE, UNSPECIFIED WHETHER SERIOUS COMORBIDITY PRESENT (HCC): Primary | ICD-10-CM

## 2023-11-08 PROCEDURE — 3008F BODY MASS INDEX DOCD: CPT

## 2023-11-08 PROCEDURE — 97803 MED NUTRITION INDIV SUBSEQ: CPT

## 2023-11-08 NOTE — PROGRESS NOTES
[facility]  [address] [facility],[address]  Dept: [phone]  Loc: [phone]  Body Composition Analysis #1     1. Weight 286.2 lbs     2. BMI 50.7     3. BMR 1723 kcal     4. Percent body fat 51.7% (147.9)     5. Visceral fat level 20 (goal is 10 or less)     6. ECW/TBW 0.384     7. SMM (skeletal muscle mass) 78.3 lbs                  Lean body mass for arms (R & L) 9.19 lbs, 144.8% & 9.30 lbs, 146.5%                  Lean body mass for trunk 68.8 lbs, 123.6%                   Lean body mass for legs (R & L) 20.15 lbs, 102.1% & 19.95 lbs, 101.1%     8. Body fat mass 147.9 lbs      9. Recommended Body fat amount loss 106.7 lbs    10. Recommendations/Status see below       11/08/23      Bariatric Follow-up Nutrition Session    Safia Hooks is a 39year old female.      Assessment     Procedure:  Vertical Sleeve Gastrectomy  Here for medical weight management: yes  Revision:  n/a  Surgery Date:  tbd  Medical Diagnosis:  obesity grade III, PCOS, KELECHI, YAMILE waiting for Cpap    Labs:  Lab Results   Component Value Date     (H) 09/07/2023    BUN 14 09/07/2023    BUNCREA 17.5 09/07/2023    CREATSERUM 0.80 09/07/2023    ANIONGAP 6 09/07/2023    GFRNAA 86 02/28/2022    GFRAA 99 02/28/2022    CA 8.6 09/07/2023    OSMOCALC 296 (H) 09/07/2023    ALKPHO 66 09/07/2023    AST 13 (L) 09/07/2023    ALT 19 09/07/2023    BILT 0.2 09/07/2023    TP 7.3 09/07/2023    ALB 3.3 (L) 09/07/2023    GLOBULIN 4.0 09/07/2023     09/07/2023    K 3.5 09/07/2023     09/07/2023    CO2 29.0 09/07/2023     No results found for: \"MG\"   No results found for: \"PHOS\"    Thyroid:    Lab Results   Component Value Date    TSH 1.360 09/07/2023    TSH 1.010 09/22/2021       Iron Panel:  Lab Results   Component Value Date    IRON 31 (L) 09/07/2023    TRANSFERRIN 359 09/07/2023    TIBCP 535 (H) 09/07/2023    SAT 6 (L) 09/07/2023       CBC:  Lab Results   Component Value Date    WBC 10.0 09/07/2023    WBC 8.3 08/25/2023    WBC 7.1 02/28/2022     Lab Results   Component Value Date    HGB 11.0 (L) 09/07/2023    HGB 11.5 (L) 08/25/2023    HGB 10.4 (L) 02/28/2022      Lab Results   Component Value Date    .0 09/07/2023    .0 08/25/2023    .0 02/28/2022       Diabetes:    Lab Results   Component Value Date     (H) 09/07/2023    A1C 6.7 (H) 09/07/2023       Lipid Panel:  Lab Results   Component Value Date    CHOLEST 158 09/07/2023    TRIG 245 (H) 09/07/2023    HDL 37 (L) 09/07/2023    LDL 80 09/07/2023    VLDL 39 (H) 09/07/2023    [place] 121 09/07/2023        Vitamins/Minerals:  Lab Results   Component Value Date    B12 377 09/07/2023     Lab Results   Component Value Date    VITD 22.2 (L) 09/07/2023     No results found for: \"THIAMINE\"   Lab Results   Component Value Date/Time    VITB1 96.2 09/07/2023 03:17 PM     Lab Results   Component Value Date/Time    FOLIC 39.9 21/73/5005 22:06 PM        Meds:     Current Outpatient Medications:     hydroCHLOROthiazide 25 MG Oral Tab, Take 1 tablet (25 mg total) by mouth daily. , Disp: 90 tablet, Rfl: 0    semaglutide (OZEMPIC, 0.25 OR 0.5 MG/DOSE,) 2 MG/3ML Subcutaneous Solution Pen-injector, Inject 0.25 mg into the skin once a week., Disp: 3 mL, Rfl: 0    HYDROCHLOROTHIAZIDE 12.5 MG Oral Cap, TAKE 1 CAPSULE BY MOUTH EVERY DAY, Disp: 30 capsule, Rfl: 0    topiramate 25 MG Oral Tab, Take 1 tablet (25 mg total) by mouth every evening., Disp: 30 tablet, Rfl: 2    ergocalciferol 1.25 MG (46715 UT) Oral Cap, Take 1 capsule (50,000 Units total) by mouth once a week., Disp: 4 capsule, Rfl: 2    Height:    Ht Readings from Last 1 Encounters:   10/05/23 5' 3\" (1.6 m)     Weight:    Wt Readings from Last 6 Encounters:   10/05/23 288 lb 9.6 oz   09/14/23 278 lb   08/25/23 278 lb   08/09/23 284 lb   03/30/23 271 lb 8 oz   03/09/23 270 lb       Weight change:  down 2.4 lbs since last month  Post-Op Excess Body Weight Loss: n/a% EBWL  BMI: There is no height or weight on file to calculate BMI. Protein Intake: 70 grams/day  Fluid intake:  24 ounces/day    Diet history:       Breakfast      AM Snack       Lunch     PM Snack     Dinner   Regular coffee 1 cup with creamer with sugar  Grapes or  3 wings and 2 tater tots  Smart bag popcorn or after dinner ham and cheese on whole grain bread with carr 3 bites Purple potatoes and  6 oz baked chicken breast               Total Calories:  2345  Excessive in: other: carbs  Inadequate in:  fruits, vegetables, and fiber     Patient has made some modifications and adjustments to diet: yes, is eating smaller portions, is eating more vegetables, is drinking more water, is tapering soda-is drinking only 2 cans per day- used to be 3-5 cans per day, is eating more slowly at dinner meal, is chewing more before swallowing, is eating separately from drinking-admits to needing to practice waiting 30 minutes   Food intolerances:  dairy- diarrhea and bloating  Vitamin/mineral supplements:  Vit D  Protein supplements:  Other no      Activity Level: moderate  Type: walk  Duration: 20 minutes  Frequency: per day    Other:  Met with pt for pre-op visit and body comp. Per pt is taking ozempic and feels it still helps with feeling phillips faster. Pt continues to pursue VSG with Dr. Alex Sharpe. Per pt is keeping a food record inconsistently and is consuming  2345 calories, 76 gm protein and 295 gm carbs per day. Per pt is eating smaller portions, is eating more vegetables, is drinking more water, is tapering soda-is drinking only 2 cans per day- used to be 3-5 cans per day, is eating more slowly at dinner meal, is chewing more before swallowing, is eating separately from drinking-admits to needing to practice waiting 30 minutes after eating before drinking. Per pt is consuming 24 oz of fluids per day. Pt encouraged to continue increasing water consumed. Per pt is exercising by walking 20 minutes per day.   Per pt is not currently doing any strength training. Reviewed body comp with pt. Pt encouraged to focus on right arm and left leg during strength training. Pt verbalized understanding. Per pt with plan to begin strength training soon. Pt congratulated for lifestyle changes made to date. Per pt with scheduled pre-op visit next month. Nutrition Diagnosis     Nutrition Diagnosis: Morbid obesity: Improvement shown     Intervention     Recommendations/goals:      1. Keep a food record, My Net Diary, select the macros dashboard. 2.  Strive to consume at least 4-6 meals/snacks per day. Include protein and produce when you eat. Aim for 52-63 grams of protein per day. Try to keep the carbohydrates at 120 grams per day or less. 3.  Continue to practice eating strategies, eat separately from drinking, avoid straws, chew food 20-30 times before swallowing. Make the meals last 30 minutes. 4.  Aim for 64 oz per day of water. (Try protein water, adding True Lemon, Crystal Light). 5.  Continue to taper caffeine and carbonation. 6.  Continue to exercise with a goal of 30 minutes per day for exercise (for example,walking). 7.  Add strength training 10 minutes 3 days per week. (7 minute workout song) or (Gym Rat no more-18 at home exercises) or Nike 10 minute workout on [employer]'s. Focus on on right arm and left leg. 8.  Do the quizzes in the binder on pages 18-24 for review at next visit. Monitor/Evaluate     Anthropometric measurements, Food/fluid intake/choices, Food intolerances, Activity level, Vitamin/mineral supplementation, Reinforce goals, and Calorie/protein intake  Additional RD visits required to review concepts? yes  Patient understands protein requirements? yes  Patient understand fluid requirements (amount and method of intake)? yes  Patient understands post-operative diet? yes  Patient keeping consistent food records? Yes, partial  Patient ready for Liquid Protein Education?  no      Beverly Abel MARTA, CARAN  Face-to-face time spent with pt: 60 minutes

## 2023-11-08 NOTE — PATIENT INSTRUCTIONS
Recommendations/goals:      1. Keep a food record, My Net Diary, select the macros dashboard. 2.  Strive to consume at least 4-6 meals/snacks per day. Include protein and produce when you eat. Aim for 52-63 grams of protein per day. Try to keep the carbohydrates at 120 grams per day or less. 3.  Continue to practice eating strategies, eat separately from drinking, avoid straws, chew food 20-30 times before swallowing. Make the meals last 30 minutes. 4.  Aim for 64 oz per day of water. (Try protein water, adding True Lemon, Crystal Light). 5.  Continue to taper caffeine and carbonation. 6.  Continue to exercise with a goal of 30 minutes per day for exercise (for example,walking). 7.  Add strength training 10 minutes 3 days per week. (7 minute workout song) or (Gym Rat no more-18 at home exercises) or Nike 10 minute workout on [employer]'s. Focus on right arm and left leg. 8.  Do the quizzes in the binder on pages 18-24 for review at next visit.

## 2023-11-15 ENCOUNTER — RT VISIT (OUTPATIENT)
Dept: RESPIRATORY THERAPY | Facility: HOSPITAL | Age: 43
End: 2023-11-15
Attending: INTERNAL MEDICINE
Payer: COMMERCIAL

## 2023-11-15 DIAGNOSIS — I10 HTN (HYPERTENSION): ICD-10-CM

## 2023-11-15 PROCEDURE — 94010 BREATHING CAPACITY TEST: CPT | Performed by: INTERNAL MEDICINE

## 2023-11-15 PROCEDURE — 94729 DIFFUSING CAPACITY: CPT | Performed by: INTERNAL MEDICINE

## 2023-11-15 PROCEDURE — 94726 PLETHYSMOGRAPHY LUNG VOLUMES: CPT | Performed by: INTERNAL MEDICINE

## 2023-11-17 NOTE — PROCEDURES
Findings:  FEV1 is 2.85L, 101% predicted. FVC is 3.34L, 96% predicted. FEV1/ FVC ratio is 0.85. The flow-volume loop demonstrates a normal pattern. The TLC is 5.14L, 108% predicted. The residual volume 1.80L, 113% predicted. The diffusion capacity is 93% predicted and 100% predicted when corrected for alveolar volume. Impression:  There is no airway obstruction on spirometry and visualized on flow-volume loop. Lung volumes are normal.  Diffusion capacity is normal.  There are no previous pulmonary function tests available for comparison.

## 2023-12-14 ENCOUNTER — OFFICE VISIT (OUTPATIENT)
Dept: SURGERY | Facility: CLINIC | Age: 43
End: 2023-12-14
Payer: COMMERCIAL

## 2023-12-14 ENCOUNTER — HOSPITAL ENCOUNTER (OUTPATIENT)
Dept: CT IMAGING | Facility: HOSPITAL | Age: 43
Discharge: HOME | End: 2023-12-14
Attending: INTERNAL MEDICINE
Payer: COMMERCIAL

## 2023-12-14 VITALS
RESPIRATION RATE: 16 BRPM | DIASTOLIC BLOOD PRESSURE: 85 MMHG | BODY MASS INDEX: 49.61 KG/M2 | WEIGHT: 280 LBS | HEIGHT: 63 IN | SYSTOLIC BLOOD PRESSURE: 169 MMHG | HEART RATE: 86 BPM

## 2023-12-14 VITALS
HEART RATE: 82 BPM | WEIGHT: 287.5 LBS | OXYGEN SATURATION: 97 % | DIASTOLIC BLOOD PRESSURE: 79 MMHG | BODY MASS INDEX: 43.57 KG/M2 | SYSTOLIC BLOOD PRESSURE: 139 MMHG | HEIGHT: 68 IN

## 2023-12-14 DIAGNOSIS — Z01.818 PRE-OPERATIVE EXAMINATION: ICD-10-CM

## 2023-12-14 DIAGNOSIS — E28.2 PCOS (POLYCYSTIC OVARIAN SYNDROME): ICD-10-CM

## 2023-12-14 DIAGNOSIS — E11.9 TYPE 2 DIABETES MELLITUS WITHOUT COMPLICATION, WITHOUT LONG-TERM CURRENT USE OF INSULIN (HCC): ICD-10-CM

## 2023-12-14 DIAGNOSIS — R94.39 ABNORMAL MYOCARDIAL PERFUSION STUDY: ICD-10-CM

## 2023-12-14 DIAGNOSIS — E66.01 MORBID OBESITY WITH BMI OF 40.0-44.9, ADULT (HCC): ICD-10-CM

## 2023-12-14 DIAGNOSIS — I10 HTN (HYPERTENSION), BENIGN: Primary | ICD-10-CM

## 2023-12-14 LAB
CREAT BLD-MCNC: 0.7 MG/DL
EGFRCR SERPLBLD CKD-EPI 2021: 109 ML/MIN/1.73M2 (ref 60–?)

## 2023-12-14 PROCEDURE — 3008F BODY MASS INDEX DOCD: CPT | Performed by: INTERNAL MEDICINE

## 2023-12-14 PROCEDURE — 99214 OFFICE O/P EST MOD 30 MIN: CPT | Performed by: INTERNAL MEDICINE

## 2023-12-14 PROCEDURE — 82565 ASSAY OF CREATININE: CPT

## 2023-12-14 PROCEDURE — 3075F SYST BP GE 130 - 139MM HG: CPT | Performed by: INTERNAL MEDICINE

## 2023-12-14 PROCEDURE — 3078F DIAST BP <80 MM HG: CPT | Performed by: INTERNAL MEDICINE

## 2023-12-14 RX ORDER — SEMAGLUTIDE 0.68 MG/ML
0.5 INJECTION, SOLUTION SUBCUTANEOUS WEEKLY
Qty: 3 ML | Refills: 3 | Status: SHIPPED | OUTPATIENT
Start: 2023-12-14 | End: 2024-01-13

## 2023-12-14 RX ORDER — DILTIAZEM HYDROCHLORIDE 5 MG/ML
INJECTION INTRAVENOUS
Status: DISCONTINUED
Start: 2023-12-14 | End: 2023-12-15

## 2023-12-14 RX ORDER — METOPROLOL TARTRATE 1 MG/ML
INJECTION, SOLUTION INTRAVENOUS
Status: COMPLETED
Start: 2023-12-14 | End: 2023-12-14

## 2023-12-14 RX ORDER — METOPROLOL TARTRATE 1 MG/ML
5 INJECTION, SOLUTION INTRAVENOUS SEE ADMIN INSTRUCTIONS
Status: DISCONTINUED | OUTPATIENT
Start: 2023-12-14 | End: 2023-12-16

## 2023-12-14 RX ORDER — NITROGLYCERIN 0.4 MG/1
0.4 TABLET SUBLINGUAL ONCE
Status: DISCONTINUED | OUTPATIENT
Start: 2023-12-14 | End: 2023-12-16

## 2023-12-14 RX ORDER — DILTIAZEM HYDROCHLORIDE 5 MG/ML
5 INJECTION INTRAVENOUS SEE ADMIN INSTRUCTIONS
Status: DISCONTINUED | OUTPATIENT
Start: 2023-12-14 | End: 2023-12-16

## 2023-12-14 RX ORDER — ALPRAZOLAM 0.25 MG/1
0.25 TABLET ORAL ONCE AS NEEDED
Status: COMPLETED | OUTPATIENT
Start: 2023-12-14 | End: 2023-12-14

## 2023-12-14 RX ORDER — ALPRAZOLAM 0.25 MG/1
TABLET ORAL
Status: COMPLETED
Start: 2023-12-14 | End: 2023-12-14

## 2023-12-14 RX ADMIN — METOPROLOL TARTRATE 5 MG: 1 INJECTION, SOLUTION INTRAVENOUS at 12:05:00

## 2023-12-14 RX ADMIN — DILTIAZEM HYDROCHLORIDE 5 MG: 5 INJECTION INTRAVENOUS at 12:32:00

## 2023-12-14 RX ADMIN — Medication 100 MG: at 10:40:00

## 2023-12-14 RX ADMIN — DILTIAZEM HYDROCHLORIDE 5 MG: 5 INJECTION INTRAVENOUS at 12:42:00

## 2023-12-14 RX ADMIN — METOPROLOL TARTRATE 5 MG: 1 INJECTION, SOLUTION INTRAVENOUS at 12:10:00

## 2023-12-14 RX ADMIN — Medication 100 MG: at 11:24:00

## 2023-12-14 RX ADMIN — DILTIAZEM HYDROCHLORIDE 5 MG: 5 INJECTION INTRAVENOUS at 12:27:00

## 2023-12-14 RX ADMIN — METOPROLOL TARTRATE 5 MG: 1 INJECTION, SOLUTION INTRAVENOUS at 12:20:00

## 2023-12-14 RX ADMIN — DILTIAZEM HYDROCHLORIDE 5 MG: 5 INJECTION INTRAVENOUS at 12:47:00

## 2023-12-14 RX ADMIN — METOPROLOL TARTRATE 5 MG: 1 INJECTION, SOLUTION INTRAVENOUS at 12:15:00

## 2023-12-14 RX ADMIN — DILTIAZEM HYDROCHLORIDE 5 MG: 5 INJECTION INTRAVENOUS at 12:37:00

## 2023-12-14 RX ADMIN — ALPRAZOLAM 0.25 MG: 0.25 TABLET ORAL at 10:40:00

## 2023-12-14 NOTE — IMAGING NOTE
TO RAD HOLDING AT 0955       HX TAKEN: cardiac heather for gastric sleeve surgery     PT CONSENTED AT 1006     BASELINE VITAL SIGNS: HR 81  /85    CTA ORDERED BY DR. Mackenzie Miranda  WAS PT GIVEN CTA PREMEDS NO, IF YES  100 MG PO METOPROLOL X2 DOSES. Patient took home dose on arrival at 46. METOPROLOL PO GIVEN  MILLIGRAMS  AND 0.25 MG PO XANAX PER PT REQUEST AT 1040. PATIENT CONFIRMED . HR 80  /78    METOPROLOL PO GIVEN 100 MILLIGRAMS  AT 1124  HR 75  /78    18 GAUGE IV STARTED AT 1120  POC TESTING COMPLETED GFR = >60   CREATINE = 0.70    1205: HR 71 /93  METOPROLOL 5 MILLIGRAMS GIVEN IV PUSH  SEE  PROTOCOL    1210: HR 71 /104 METOPROLOL 5 MILLIGRAMS GIVEN IV PUSH     1215: HR 72 /110 METOPROLOL 5 MILLIGRAMS  GIVEN IV PUSH    1220: HR 71 /86 METOPROLOL 5 MILLIGRAMS  GIVEN IV PUSH    1227: HR 73 /85 CARDIZEM 5 MILLIGRAMS  GIVEN IV PUSH     1232: HR 73 /89 CARDIZEM 5 MILLIGRAMS  GIVEN IV PUSH     1237: HR 73 /89 CARDIZEM 5 MILLIGRAMS  GIVEN IV PUSH     1242: HR 73 /86 CARDIZEM 5 MILLIGRAMS  GIVEN IV PUSH     1247: HR 73 /84 CARDIZEM 5 MILLIGRAMS  GIVEN IV PUSH     1248  This RN spoke with Dr. Elizabeth Bravo regarding patient's elevated HR. Dr. Elizabeth Bravo ordered 20 mg IV metoprolol PRN for elevated HR. Patient expresses she has additional appointment at 2pm today that she does not want to wish. She wishes to reschedule this exam at later time. Patient rescheduled for Jan 5th 8am.     1300  IV removed and patient discharged comfortably. Patient denies lightheadedness or dizziness at this time. 1310  This RN spoke with Torri Montanez, Dr. Carolann Wilson RN. She states they will plan to medicate patient appropriately for re-attempt of study.

## 2024-01-03 ENCOUNTER — NURSE TRIAGE (OUTPATIENT)
Facility: CLINIC | Age: 44
End: 2024-01-03

## 2024-01-03 NOTE — TELEPHONE ENCOUNTER
Action Requested: Summary for Provider     []  Critical Lab, Recommendations Needed  [] Need Additional Advice  []   FYI    []   Need Orders  [] Need Medications Sent to Pharmacy  []  Other     SUMMARY: Per protocol disposition advised office visit today or tomorrow. Patient was asked to wear a mask      Future Appointments   Date Time Provider Department Center   1/4/2024  6:30 PM Danae Ledesma MD EMM[facility] 14 FP EMM[facility] 10 OP   1/5/2024  8:00 AM [facility] RM1 CARD [facility] [place]   1/5/2024 12:00 PM Sherry Alcantara PsAnnalisa LOMG ELM BAR LOMG [place]   2/6/2024  8:00 AM [id number]   2/6/2024 10:30 AM Yo Winchester DO EMM[facility] 14 FP E[facility] 10    2/6/2024  3:30 PM [facility]   5/9/2024  3:15 PM Neftali Jackson MD [id number]       Reason for call: Cold  Onset: 2 weeks     Cough/congestion/sore throat x2 weeks. Drainage is yellow. Afebrile. Denies sinus pain/pressure. Negative home COVID. Possible RSV exposure. Denies other symptoms marked no under protocol.     Reason for Disposition   Patient wants to be seen    Protocols used: Common Cold-A-OH

## 2024-01-04 ENCOUNTER — HOSPITAL ENCOUNTER (OUTPATIENT)
Age: 44
Discharge: LEFT BEFORE DISCHARGE | End: 2024-01-04
Payer: COMMERCIAL

## 2024-01-04 ENCOUNTER — OFFICE VISIT (OUTPATIENT)
Facility: CLINIC | Age: 44
End: 2024-01-04
Payer: COMMERCIAL

## 2024-01-04 VITALS
TEMPERATURE: 98 F | OXYGEN SATURATION: 100 % | SYSTOLIC BLOOD PRESSURE: 166 MMHG | RESPIRATION RATE: 20 BRPM | DIASTOLIC BLOOD PRESSURE: 90 MMHG | HEART RATE: 90 BPM

## 2024-01-04 VITALS
BODY MASS INDEX: 50.68 KG/M2 | SYSTOLIC BLOOD PRESSURE: 160 MMHG | HEIGHT: 63 IN | DIASTOLIC BLOOD PRESSURE: 96 MMHG | WEIGHT: 286 LBS | TEMPERATURE: 98 F | HEART RATE: 58 BPM | OXYGEN SATURATION: 98 %

## 2024-01-04 DIAGNOSIS — R05.2 SUBACUTE COUGH: Primary | ICD-10-CM

## 2024-01-04 LAB — S PYO AG THROAT QL: NEGATIVE

## 2024-01-04 PROCEDURE — 3080F DIAST BP >= 90 MM HG: CPT | Performed by: FAMILY MEDICINE

## 2024-01-04 PROCEDURE — 3008F BODY MASS INDEX DOCD: CPT | Performed by: FAMILY MEDICINE

## 2024-01-04 PROCEDURE — 87637 SARSCOV2&INF A&B&RSV AMP PRB: CPT | Performed by: FAMILY MEDICINE

## 2024-01-04 PROCEDURE — 99213 OFFICE O/P EST LOW 20 MIN: CPT | Performed by: FAMILY MEDICINE

## 2024-01-04 PROCEDURE — 3077F SYST BP >= 140 MM HG: CPT | Performed by: FAMILY MEDICINE

## 2024-01-04 RX ORDER — ALBUTEROL SULFATE 90 UG/1
1-2 AEROSOL, METERED RESPIRATORY (INHALATION)
Qty: 18 G | Refills: 0 | Status: SHIPPED | OUTPATIENT
Start: 2024-01-04

## 2024-01-05 ENCOUNTER — PATIENT MESSAGE (OUTPATIENT)
Facility: CLINIC | Age: 44
End: 2024-01-05

## 2024-01-05 DIAGNOSIS — R05.2 SUBACUTE COUGH: Primary | ICD-10-CM

## 2024-01-05 LAB
FLUAV + FLUBV RNA SPEC NAA+PROBE: NOT DETECTED
FLUAV + FLUBV RNA SPEC NAA+PROBE: NOT DETECTED
RSV RNA SPEC NAA+PROBE: NOT DETECTED
SARS-COV-2 RNA RESP QL NAA+PROBE: NOT DETECTED

## 2024-01-05 NOTE — PROGRESS NOTES
Subjective:   Yareli Lynch is a 43 year old female who presents for Cough (Pt states she has cough since yovani.cough makes pt dizzy) and Sore Throat     Patient presents for cough for the past 11 days. Leads to coughing fits which make patient dizzy. Also has sore throat. At home covid test negative x2. Took first test on day 3 of symptoms and second test on day 6 of symptoms. Did have exposure to RSV. No acute reworsening of symptoms. Lots of mucus and fatigue. No fevers at home.     History/Other:    Chief Complaint Reviewed and Verified  Nursing Notes Reviewed and   Verified  Tobacco Reviewed  Allergies Reviewed  Medications Reviewed    Problem List Reviewed  Medical History Reviewed  Surgical History   Reviewed  OB Status Reviewed  Family History Reviewed         Tobacco:  She smoked tobacco in the past but quit greater than 12 months ago.  Social History    Tobacco Use      Smoking status: Former      Smokeless tobacco: Never      Tobacco comments: On and off       Current Outpatient Medications   Medication Sig Dispense Refill    albuterol 108 (90 Base) MCG/ACT Inhalation Aero Soln Inhale 1-2 puffs into the lungs every 4 to 6 hours as needed for Wheezing. 18 g 0    semaglutide (OZEMPIC, 0.25 OR 0.5 MG/DOSE,) 2 MG/3ML Subcutaneous Solution Pen-injector Inject 0.5 mg into the skin once a week. 3 mL 3    hydroCHLOROthiazide 25 MG Oral Tab Take 1 tablet (25 mg total) by mouth daily. 90 tablet 0         Review of Systems:  Review of Systems   Constitutional: Negative.    HENT:  Positive for sore throat.    Respiratory:  Positive for cough.    Cardiovascular: Negative.    Gastrointestinal: Negative.    Skin: Negative.    Neurological: Negative.        Objective:   BP (!) 160/96   Pulse 58   Temp 98.1 °F (36.7 °C) (Oral)   Ht 5' 3\" (1.6 m)   Wt 286 lb (129.7 kg)   SpO2 98%   BMI 50.66 kg/m²  Estimated body mass index is 50.66 kg/m² as calculated from the following:    Height as of this  encounter: 5' 3\" (1.6 m).    Weight as of this encounter: 286 lb (129.7 kg).  Physical Exam  Vitals and nursing note reviewed.   Constitutional:       General: She is not in acute distress.     Appearance: Normal appearance.   HENT:      Head: Normocephalic and atraumatic.   Eyes:      General:         Right eye: No discharge.         Left eye: No discharge.      Comments: Extraocular eye movements grossly intact   Pulmonary:      Effort: Pulmonary effort is normal. No respiratory distress.      Breath sounds: No stridor. Rhonchi present. No wheezing or rales.   Musculoskeletal:      Comments: Normal gait   Skin:     Findings: No rash.   Neurological:      General: No focal deficit present.      Mental Status: She is alert. Mental status is at baseline.   Psychiatric:         Mood and Affect: Mood normal.         Behavior: Behavior normal.         Assessment & Plan:   1. Subacute cough (Primary)  -     SARS-COV-22-ALINITY; Future; Expected date: 01/04/2024  -     Albuterol Sulfate HFA; Inhale 1-2 puffs into the lungs every 4 to 6 hours as needed for Wheezing.  Dispense: 18 g; Refill: 0    -collected viral swab to rule out RSV. Less likely covid or flu. Discussed option of trialing albuterol plus or minus prednisone. Patient has taken prednisone and worked well but would like to trial albuterol inhaler first and add on prednisone later if needed.         Return if symptoms worsen or fail to improve.    Danae Ledesma MD, 1/4/2024, 6:38 PM

## 2024-01-06 RX ORDER — CODEINE PHOSPHATE AND GUAIFENESIN 10; 100 MG/5ML; MG/5ML
10 SOLUTION ORAL 3 TIMES DAILY PRN
Qty: 120 ML | Refills: 0 | Status: SHIPPED | OUTPATIENT
Start: 2024-01-06

## 2024-01-07 NOTE — TELEPHONE ENCOUNTER
From: Yareli Lynch  Sent: 1/6/2024 9:08 AM CST  To: Danae Ledesma MD  Subject: Viral Swab results    I think I will take the cough syrup. I still have some prednisone from the last time I was sick, so I will try that during the day, if needed. The inhaler is amazing tho. This is the first time I can remember ever getting this but now I know when I am sick this should be an option to consider. Thank you so much.     Yareli

## 2024-01-07 NOTE — TELEPHONE ENCOUNTER
[place] reviewed today 01/06/24  . No red flags. Sent in cough syrup as written    Danae Ledesma MD, 01/06/24, 9:07 PM

## 2024-01-11 ENCOUNTER — HOSPITAL ENCOUNTER (EMERGENCY)
Facility: HOSPITAL | Age: 44
Discharge: HOME | End: 2024-01-11
Attending: EMERGENCY MEDICINE
Payer: COMMERCIAL

## 2024-01-11 ENCOUNTER — APPOINTMENT (OUTPATIENT)
Dept: GENERAL RADIOLOGY | Facility: HOSPITAL | Age: 44
End: 2024-01-11
Attending: EMERGENCY MEDICINE
Payer: COMMERCIAL

## 2024-01-11 ENCOUNTER — PATIENT OUTREACH (OUTPATIENT)
Dept: CASE MANAGEMENT | Age: 44
End: 2024-01-11

## 2024-01-11 VITALS
WEIGHT: 276 LBS | SYSTOLIC BLOOD PRESSURE: 194 MMHG | BODY MASS INDEX: 48.9 KG/M2 | HEART RATE: 110 BPM | TEMPERATURE: 99 F | DIASTOLIC BLOOD PRESSURE: 96 MMHG | OXYGEN SATURATION: 93 % | HEIGHT: 63 IN | RESPIRATION RATE: 28 BRPM

## 2024-01-11 DIAGNOSIS — J45.41 MODERATE PERSISTENT REACTIVE AIRWAY DISEASE WITH ACUTE EXACERBATION: Primary | ICD-10-CM

## 2024-01-11 DIAGNOSIS — J06.9 VIRAL URI WITH COUGH: ICD-10-CM

## 2024-01-11 PROCEDURE — 99284 EMERGENCY DEPT VISIT MOD MDM: CPT

## 2024-01-11 PROCEDURE — 94644 CONT INHLJ TX 1ST HOUR: CPT

## 2024-01-11 PROCEDURE — 71045 X-RAY EXAM CHEST 1 VIEW: CPT | Performed by: EMERGENCY MEDICINE

## 2024-01-11 RX ORDER — ALBUTEROL SULFATE 2.5 MG/3ML
10 SOLUTION RESPIRATORY (INHALATION) CONTINUOUS
Status: DISCONTINUED | OUTPATIENT
Start: 2024-01-11 | End: 2024-01-11

## 2024-01-11 RX ORDER — PREDNISONE 20 MG/1
60 TABLET ORAL ONCE
Status: COMPLETED | OUTPATIENT
Start: 2024-01-11 | End: 2024-01-11

## 2024-01-11 RX ORDER — ALBUTEROL SULFATE 90 UG/1
2 AEROSOL, METERED RESPIRATORY (INHALATION) EVERY 4 HOURS PRN
Qty: 1 EACH | Refills: 0 | Status: SHIPPED | OUTPATIENT
Start: 2024-01-11 | End: 2024-01-13

## 2024-01-11 RX ORDER — PREDNISONE 20 MG/1
40 TABLET ORAL DAILY
Qty: 10 TABLET | Refills: 0 | Status: SHIPPED | OUTPATIENT
Start: 2024-01-11 | End: 2024-01-17

## 2024-01-11 NOTE — ED INITIAL ASSESSMENT (HPI)
Yareli arrived through triage for c/o ongoing cough which she states started before Jean. She has been frequently using a prescribed inhaler without relief. Diffusely wheezing on my exam.

## 2024-01-11 NOTE — ED PROVIDER NOTES
Patient Seen in: [facility] Emergency Department    History     Chief Complaint   Patient presents with    Cough/URI       HPI    43-year-old female with past medical history significant for anxiety, high blood pressure, morbid obesity, PTSD, PCOS, IBS, presents to the ED for evaluation of cough, shortness of breath, wheezing.  Patient states that she has had symptoms for about 2 and half weeks now but her coughing has not yet resolved.  She states she is not bringing up any sputum and denies any fevers or chills.  She is concerned about the wheezing and shortness of breath.    History reviewed.   Past Medical History:   Diagnosis Date    Allergic rhinitis     Anxiety     DVT (deep vein thrombosis) in pregnancy 2021    Encounter for therapeutic drug monitoring 2023    Essential hypertension     Gestational diabetes     High myopia     History of IBS     History of PCOS     Hypertension     Infertility, female     Morbid obesity with BMI of 45.0-49.9, adult (HCC)     Obesity     PTSD (post-traumatic stress disorder)        History reviewed.   Past Surgical History:   Procedure Laterality Date      2019, 2021    COLONOSCOPY  2018    IVF PACKAGE  2018    OTHER SURGICAL HISTORY      egg retrieval for IVF         Medications :  (Not in a hospital admission)       Family History   Problem Relation Age of Onset    Thyroid Disorder Father     Diabetes Father     Diabetes Mother     Hypertension Mother     No Known Problems Maternal Grandmother     No Known Problems Maternal Grandfather     Glaucoma Neg     Retinal detachment Neg     Macular degeneration Neg        Smoking Status:   Social History     Socioeconomic History    Marital status:    Occupational History    Occupation: health    Tobacco Use    Smoking status: Former    Smokeless tobacco: Never    Tobacco comments:     On and off   Vaping Use    Vaping Use: Never used   Substance and Sexual  Activity    Alcohol use: No    Drug use: No    Sexual activity: Yes     Partners: Female     Comment: with wife    Other Topics Concern    Blood Transfusions No       Constitutional and vital signs reviewed.      Social History and Family History elements reviewed from today, pertinent positives to the presenting problem noted.    Physical Exam     ED Triage Vitals [01/11/24 0720]   BP (!) 194/96   Pulse 108   Resp (!) 28   Temp 98.5 °F (36.9 °C)   Temp src Temporal   SpO2 96 %   O2 Device None (Room air)       Physical Exam   Constitutional: AAOx3, well nourished, NAD  HEENT: Normocephalic, PERRLA, MMM  CV: s1s2+, RRR, no m/r/g, normal distal pulses  Pulmonary/Chest: Moderate expiratory wheezing bilateral with tachypnea.  No chest wall tenderness  Abdominal: Nontender.  Nondistended. Soft. Bowel sounds are normal.   Neck/Back:   :   Musculoskeletal: Normal range of motion. No deformity.   Neurological: Awake, alert. Normal reflexes. No cranial nerve deficit.    Skin: Skin is warm and dry. No rash noted. No erythema.   Psychiatric:      All measures to prevent infection transmission during my interaction with the patient were taken. The patient was already wearing a droplet mask on my arrival to the room. Personal protective equipment was worn throughout the duration of the exam.      ED Course      Labs Reviewed - No data to display  My Independent Interpretation of EKG (if performed):     Monitor Interpretation:   normal sinus rhythm as interpreted by me.      Imaging Results Available and Reviewed while in ED: XR CHEST AP/PA (1 VIEW) (CPT=71045)    Result Date: 1/11/2024  CONCLUSION:  1. There is no persistent opacity at the right upper lobe on this lordotic apical view of the chest, and the findings on the previous study were more likely related to the anterior portion of the right 1st rib.    Dictated by (CST): Bret Rolon MD on 1/11/2024 at 10:10 AM     Finalized by (CST): Bret Rolon MD on 1/11/2024  at 10:11 AM          XR CHEST AP PORTABLE  (CPT=71045)    Result Date: 1/11/2024  CONCLUSION:  1. Poorly defined nodular opacity at the right upper lobe measuring 7.6 x 14.1 mm more likely related to the anterior portion of the right 1st rib, but I recommend lordotic apical view of the chest to further assess and exclude underlying pulmonary nodules since no previous studies are available here for comparison.  There is no acute airspace consolidation.m    Dictated by (CST): Bret Rolon MD on 1/11/2024 at 8:29 AM     Finalized by (CST): Bret Rolon MD on 1/11/2024 at 8:31 AM         ED Medications Administered:   Medications   albuterol (Ventolin) (2.5 MG/3ML) 0.083% nebulizer solution 10 mg (10 mg Nebulization New Bag 1/11/24 0753)   ipratropium (Atrovent) 0.02 % nebulizer solution 1 mg (1 mg Nebulization New Bag 1/11/24 0753)   predniSONE (Deltasone) tab 60 mg (60 mg Oral Given 1/11/24 0748)             MDM     Vitals:    01/11/24 0720   BP: (!) 194/96   Pulse: 108   Resp: (!) 28   Temp: 98.5 °F (36.9 °C)   TempSrc: Temporal   SpO2: 96%   Weight: 125.2 kg   Height: 160 cm (5' 3\")     *I personally reviewed and interpreted all ED vitals.    Pulse Ox: 96%, Room air, Normal     Independent Interpretation of Studies: Independently reviewed both prior chest x-rays and the initial x-ray showed a possible right upper lobe nodule.  Second chest x-ray does not show any findings.  X-rays are otherwise clear    History from Independent Source:       External Records Reviewed:     Social Determinants of Health:     Procedures:      Differential/MDM/Shared Decision Making: Differential diagnosis includes reactive airway disease, [facility] pneumonia, COVID, others.      The patient already has obesity, IBS, PCOS, high blood pressure, anxiety, to contribute to the complexity of this ED evaluation.    I believe patient is having symptoms from reactive airway disease.  Her breath sounds are better after hour-long nebulizer  treatment.  Will discharge on inhalers and steroids..  Discussed case with patient and she has no other concerns at this time.  Room air oxygen saturations are 93%.      Condition upon leaving the department: Stable    Disposition and Plan     Clinical Impression:  1. Moderate persistent reactive airway disease with acute exacerbation    2. Viral URI with cough        Disposition:  Discharge    Follow-up:  Yo Winchester DO  [address] [address]  [phone]    Call in 2 day(s)        Medications Prescribed:  Current Discharge Medication List        START taking these medications    Details   predniSONE 20 MG Oral Tab Take 2 tablets (40 mg total) by mouth daily for 5 days.  Qty: 10 tablet, Refills: 0      !! albuterol 108 (90 Base) MCG/ACT Inhalation Aero Soln Inhale 2 puffs into the lungs every 4 (four) hours as needed.  Qty: 1 each, Refills: 0       !! - Potential duplicate medications found. Please discuss with provider.

## 2024-01-11 NOTE — PROGRESS NOTES
1st attempt ER f/up apt request  No answer, LVMTCB to schedule apt  PCP -existing apt (2/6) for annual physical, unable to contact  Closing encounter

## 2024-01-12 ENCOUNTER — TELEPHONE (OUTPATIENT)
Facility: CLINIC | Age: 44
End: 2024-01-12

## 2024-01-12 NOTE — TELEPHONE ENCOUNTER
Pt stated she went to ER- feels a little better, requesting f/u Tx next week, ok to use SDA slots

## 2024-01-13 RX ORDER — ALBUTEROL SULFATE 2.5 MG/3ML
2.5 SOLUTION RESPIRATORY (INHALATION) EVERY 4 HOURS PRN
Qty: 30 ML | Refills: 3 | Status: ON HOLD | OUTPATIENT
Start: 2024-01-13 | End: 2024-01-17

## 2024-01-13 RX ORDER — FLUTICASONE FUROATE 100 UG/1
1 POWDER RESPIRATORY (INHALATION) DAILY
Qty: 30 EACH | Refills: 0 | Status: SHIPPED | OUTPATIENT
Start: 2024-01-13 | End: 2024-01-17

## 2024-01-14 ENCOUNTER — APPOINTMENT (OUTPATIENT)
Dept: CT IMAGING | Facility: HOSPITAL | Age: 44
End: 2024-01-14
Attending: HOSPITALIST
Payer: COMMERCIAL

## 2024-01-14 ENCOUNTER — HOSPITAL ENCOUNTER (INPATIENT)
Facility: HOSPITAL | Age: 44
LOS: 3 days | Discharge: HOME | End: 2024-01-17
Attending: STUDENT IN AN ORGANIZED HEALTH CARE EDUCATION/TRAINING PROGRAM | Admitting: HOSPITALIST
Payer: COMMERCIAL

## 2024-01-14 ENCOUNTER — APPOINTMENT (OUTPATIENT)
Dept: GENERAL RADIOLOGY | Facility: HOSPITAL | Age: 44
End: 2024-01-14
Attending: STUDENT IN AN ORGANIZED HEALTH CARE EDUCATION/TRAINING PROGRAM
Payer: COMMERCIAL

## 2024-01-14 DIAGNOSIS — J45.41 MODERATE PERSISTENT REACTIVE AIRWAY DISEASE WITH ACUTE EXACERBATION: Primary | ICD-10-CM

## 2024-01-14 LAB
ANION GAP SERPL CALC-SCNC: 5 MMOL/L (ref 0–18)
ATRIAL RATE: 106 BPM
BASOPHILS # BLD AUTO: 0.02 X10(3) UL (ref 0–0.2)
BASOPHILS NFR BLD AUTO: 0.1 %
BUN BLD-MCNC: 11 MG/DL (ref 9–23)
BUN/CREAT SERPL: 15.7 (ref 10–20)
CALCIUM BLD-MCNC: 8.6 MG/DL (ref 8.7–10.4)
CHLORIDE SERPL-SCNC: 100 MMOL/L (ref 98–112)
CO2 SERPL-SCNC: 30 MMOL/L (ref 21–32)
CREAT BLD-MCNC: 0.7 MG/DL
D DIMER PPP FEU-MCNC: <0.27 UG/ML FEU (ref ?–0.5)
DEPRECATED RDW RBC AUTO: 44.4 FL (ref 35.1–46.3)
EGFRCR SERPLBLD CKD-EPI 2021: 109 ML/MIN/1.73M2 (ref 60–?)
EOSINOPHIL # BLD AUTO: 0.2 X10(3) UL (ref 0–0.7)
EOSINOPHIL NFR BLD AUTO: 1.3 %
ERYTHROCYTE [DISTWIDTH] IN BLOOD BY AUTOMATED COUNT: 15.5 % (ref 11–15)
FLUAV + FLUBV RNA SPEC NAA+PROBE: NEGATIVE
FLUAV + FLUBV RNA SPEC NAA+PROBE: NEGATIVE
GLUCOSE BLD-MCNC: 136 MG/DL (ref 70–99)
GLUCOSE BLDC GLUCOMTR-MCNC: 238 MG/DL (ref 70–99)
HCT VFR BLD AUTO: 39.1 %
HGB BLD-MCNC: 12 G/DL
IMM GRANULOCYTES # BLD AUTO: 0.1 X10(3) UL (ref 0–1)
IMM GRANULOCYTES NFR BLD: 0.7 %
LYMPHOCYTES # BLD AUTO: 1.19 X10(3) UL (ref 1–4)
LYMPHOCYTES NFR BLD AUTO: 8 %
MCH RBC QN AUTO: 24.3 PG (ref 26–34)
MCHC RBC AUTO-ENTMCNC: 30.7 G/DL (ref 31–37)
MCV RBC AUTO: 79.3 FL
MONOCYTES # BLD AUTO: 0.64 X10(3) UL (ref 0.1–1)
MONOCYTES NFR BLD AUTO: 4.3 %
NEUTROPHILS # BLD AUTO: 12.81 X10 (3) UL (ref 1.5–7.7)
NEUTROPHILS # BLD AUTO: 12.81 X10(3) UL (ref 1.5–7.7)
NEUTROPHILS NFR BLD AUTO: 85.6 %
OSMOLALITY SERPL CALC.SUM OF ELEC: 281 MOSM/KG (ref 275–295)
P AXIS: 75 DEGREES
P-R INTERVAL: 150 MS
PLATELET # BLD AUTO: 263 10(3)UL (ref 150–450)
POTASSIUM SERPL-SCNC: 2.8 MMOL/L (ref 3.5–5.1)
POTASSIUM SERPL-SCNC: 3.2 MMOL/L (ref 3.5–5.1)
Q-T INTERVAL: 348 MS
QRS DURATION: 96 MS
QTC CALCULATION (BEZET): 462 MS
R AXIS: -13 DEGREES
RBC # BLD AUTO: 4.93 X10(6)UL
RSV RNA SPEC NAA+PROBE: NEGATIVE
SARS-COV-2 RNA RESP QL NAA+PROBE: NOT DETECTED
SODIUM SERPL-SCNC: 135 MMOL/L (ref 136–145)
T AXIS: 59 DEGREES
TROPONIN I SERPL HS-MCNC: 31 NG/L
TROPONIN I SERPL HS-MCNC: 45 NG/L
VENTRICULAR RATE: 106 BPM
WBC # BLD AUTO: 15 X10(3) UL (ref 4–11)

## 2024-01-14 PROCEDURE — 99223 1ST HOSP IP/OBS HIGH 75: CPT | Performed by: HOSPITALIST

## 2024-01-14 PROCEDURE — 99223 1ST HOSP IP/OBS HIGH 75: CPT | Performed by: INTERNAL MEDICINE

## 2024-01-14 PROCEDURE — 71045 X-RAY EXAM CHEST 1 VIEW: CPT | Performed by: STUDENT IN AN ORGANIZED HEALTH CARE EDUCATION/TRAINING PROGRAM

## 2024-01-14 PROCEDURE — 70450 CT HEAD/BRAIN W/O DYE: CPT | Performed by: HOSPITALIST

## 2024-01-14 RX ORDER — POLYETHYLENE GLYCOL 3350 17 G/17G
17 POWDER, FOR SOLUTION ORAL DAILY PRN
Status: DISCONTINUED | OUTPATIENT
Start: 2024-01-14 | End: 2024-01-17

## 2024-01-14 RX ORDER — IPRATROPIUM BROMIDE AND ALBUTEROL SULFATE 2.5; .5 MG/3ML; MG/3ML
3 SOLUTION RESPIRATORY (INHALATION) EVERY 6 HOURS
Status: DISCONTINUED | OUTPATIENT
Start: 2024-01-14 | End: 2024-01-14

## 2024-01-14 RX ORDER — ENOXAPARIN SODIUM 100 MG/ML
0.5 INJECTION SUBCUTANEOUS NIGHTLY
Status: DISCONTINUED | OUTPATIENT
Start: 2024-01-14 | End: 2024-01-15

## 2024-01-14 RX ORDER — HYDRALAZINE HYDROCHLORIDE 20 MG/ML
10 INJECTION INTRAMUSCULAR; INTRAVENOUS ONCE
Status: COMPLETED | OUTPATIENT
Start: 2024-01-14 | End: 2024-01-14

## 2024-01-14 RX ORDER — METHYLPREDNISOLONE SODIUM SUCCINATE 40 MG/ML
40 INJECTION, POWDER, LYOPHILIZED, FOR SOLUTION INTRAMUSCULAR; INTRAVENOUS EVERY 8 HOURS
Status: DISCONTINUED | OUTPATIENT
Start: 2024-01-14 | End: 2024-01-15

## 2024-01-14 RX ORDER — ENEMA 19; 7 G/133ML; G/133ML
1 ENEMA RECTAL ONCE AS NEEDED
Status: DISCONTINUED | OUTPATIENT
Start: 2024-01-14 | End: 2024-01-17

## 2024-01-14 RX ORDER — ACETAMINOPHEN 325 MG/1
650 TABLET ORAL ONCE
Status: COMPLETED | OUTPATIENT
Start: 2024-01-14 | End: 2024-01-14

## 2024-01-14 RX ORDER — IPRATROPIUM BROMIDE AND ALBUTEROL SULFATE 2.5; .5 MG/3ML; MG/3ML
3 SOLUTION RESPIRATORY (INHALATION)
Status: DISCONTINUED | OUTPATIENT
Start: 2024-01-15 | End: 2024-01-17

## 2024-01-14 RX ORDER — POTASSIUM CHLORIDE 20 MEQ/1
40 TABLET, EXTENDED RELEASE ORAL ONCE
Status: COMPLETED | OUTPATIENT
Start: 2024-01-14 | End: 2024-01-14

## 2024-01-14 RX ORDER — DEXTROSE MONOHYDRATE, SODIUM CHLORIDE, AND POTASSIUM CHLORIDE 50; 1.49; 4.5 G/1000ML; G/1000ML; G/1000ML
INJECTION, SOLUTION INTRAVENOUS CONTINUOUS
Status: DISCONTINUED | OUTPATIENT
Start: 2024-01-14 | End: 2024-01-15

## 2024-01-14 RX ORDER — IPRATROPIUM BROMIDE AND ALBUTEROL SULFATE 2.5; .5 MG/3ML; MG/3ML
3 SOLUTION RESPIRATORY (INHALATION)
Status: DISCONTINUED | OUTPATIENT
Start: 2024-01-14 | End: 2024-01-14

## 2024-01-14 RX ORDER — DEXTROSE MONOHYDRATE 25 G/50ML
50 INJECTION, SOLUTION INTRAVENOUS
Status: DISCONTINUED | OUTPATIENT
Start: 2024-01-14 | End: 2024-01-17

## 2024-01-14 RX ORDER — SENNOSIDES 8.6 MG
17.2 TABLET ORAL NIGHTLY PRN
Status: DISCONTINUED | OUTPATIENT
Start: 2024-01-14 | End: 2024-01-17

## 2024-01-14 RX ORDER — MORPHINE SULFATE 2 MG/ML
2 INJECTION, SOLUTION INTRAMUSCULAR; INTRAVENOUS ONCE
Status: COMPLETED | OUTPATIENT
Start: 2024-01-14 | End: 2024-01-14

## 2024-01-14 RX ORDER — METHYLPREDNISOLONE SODIUM SUCCINATE 125 MG/2ML
125 INJECTION, POWDER, LYOPHILIZED, FOR SOLUTION INTRAMUSCULAR; INTRAVENOUS ONCE
Status: COMPLETED | OUTPATIENT
Start: 2024-01-14 | End: 2024-01-14

## 2024-01-14 RX ORDER — METHYLPREDNISOLONE SODIUM SUCCINATE 40 MG/ML
40 INJECTION, POWDER, LYOPHILIZED, FOR SOLUTION INTRAMUSCULAR; INTRAVENOUS EVERY 8 HOURS
Status: DISCONTINUED | OUTPATIENT
Start: 2024-01-14 | End: 2024-01-14

## 2024-01-14 RX ORDER — ONDANSETRON 2 MG/ML
4 INJECTION INTRAMUSCULAR; INTRAVENOUS EVERY 6 HOURS PRN
Status: DISCONTINUED | OUTPATIENT
Start: 2024-01-14 | End: 2024-01-17

## 2024-01-14 RX ORDER — ACETAMINOPHEN 500 MG
1000 TABLET ORAL ONCE
Status: DISCONTINUED | OUTPATIENT
Start: 2024-01-14 | End: 2024-01-14

## 2024-01-14 RX ORDER — DILTIAZEM HYDROCHLORIDE 5 MG/ML
5 INJECTION INTRAVENOUS EVERY 6 HOURS PRN
Status: DISCONTINUED | OUTPATIENT
Start: 2024-01-14 | End: 2024-01-17

## 2024-01-14 RX ORDER — BISACODYL 10 MG
10 SUPPOSITORY, RECTAL RECTAL
Status: DISCONTINUED | OUTPATIENT
Start: 2024-01-14 | End: 2024-01-17

## 2024-01-14 RX ORDER — PROCHLORPERAZINE EDISYLATE 5 MG/ML
5 INJECTION INTRAMUSCULAR; INTRAVENOUS EVERY 8 HOURS PRN
Status: DISCONTINUED | OUTPATIENT
Start: 2024-01-14 | End: 2024-01-17

## 2024-01-14 RX ORDER — FLUTICASONE FUROATE AND VILANTEROL 200; 25 UG/1; UG/1
1 POWDER RESPIRATORY (INHALATION) DAILY
Status: DISCONTINUED | OUTPATIENT
Start: 2024-01-14 | End: 2024-01-17

## 2024-01-14 RX ORDER — NICOTINE POLACRILEX 4 MG
30 LOZENGE BUCCAL
Status: DISCONTINUED | OUTPATIENT
Start: 2024-01-14 | End: 2024-01-17

## 2024-01-14 RX ORDER — ASPIRIN 325 MG
325 TABLET ORAL DAILY
Status: DISCONTINUED | OUTPATIENT
Start: 2024-01-14 | End: 2024-01-17

## 2024-01-14 RX ORDER — LOSARTAN POTASSIUM 25 MG/1
25 TABLET ORAL EVERY 24 HOURS
Status: DISCONTINUED | OUTPATIENT
Start: 2024-01-14 | End: 2024-01-17

## 2024-01-14 RX ORDER — AMLODIPINE BESYLATE 10 MG/1
10 TABLET ORAL DAILY
Status: DISCONTINUED | OUTPATIENT
Start: 2024-01-14 | End: 2024-01-15

## 2024-01-14 RX ORDER — NICOTINE POLACRILEX 4 MG
15 LOZENGE BUCCAL
Status: DISCONTINUED | OUTPATIENT
Start: 2024-01-14 | End: 2024-01-17

## 2024-01-14 RX ORDER — ALBUTEROL SULFATE 2.5 MG/3ML
10 SOLUTION RESPIRATORY (INHALATION) CONTINUOUS
Status: DISCONTINUED | OUTPATIENT
Start: 2024-01-14 | End: 2024-01-14 | Stop reason: ALTCHOICE

## 2024-01-14 RX ORDER — MAGNESIUM SULFATE HEPTAHYDRATE 40 MG/ML
2 INJECTION, SOLUTION INTRAVENOUS ONCE
Status: COMPLETED | OUTPATIENT
Start: 2024-01-14 | End: 2024-01-14

## 2024-01-14 RX ORDER — IPRATROPIUM BROMIDE AND ALBUTEROL SULFATE 2.5; .5 MG/3ML; MG/3ML
3 SOLUTION RESPIRATORY (INHALATION) ONCE
Status: DISCONTINUED | OUTPATIENT
Start: 2024-01-14 | End: 2024-01-14

## 2024-01-14 RX ORDER — HYDRALAZINE HYDROCHLORIDE 20 MG/ML
20 INJECTION INTRAMUSCULAR; INTRAVENOUS EVERY 4 HOURS PRN
Status: DISCONTINUED | OUTPATIENT
Start: 2024-01-14 | End: 2024-01-17

## 2024-01-14 RX ORDER — CODEINE PHOSPHATE AND GUAIFENESIN 10; 100 MG/5ML; MG/5ML
10 SOLUTION ORAL 3 TIMES DAILY PRN
Status: DISCONTINUED | OUTPATIENT
Start: 2024-01-14 | End: 2024-01-15

## 2024-01-14 NOTE — TELEPHONE ENCOUNTER
Paged by patient due to recurrence of her symptoms. States she has a \"bad cough,\" but no longer has phlegm coming up when she coughs. Has mld shortness of breath, but her pulse oximeter shows an oxygen of 94-95% at the lowest, and 98% at the highest. The highest her heart rate got was 109. Her wheezing has returned and is \"full on\" like it was two days ago. She can not sleep at night due to the whistling at times. Has been using her Albuterol inhaler every 2 hours on average, and does not feel it helps very much. Has also continued Prednisone 40 mg daily, and is currently on day 3 of the Prednisone. Using the cough syrup at night. Denies any history of asthma, but her family members have asthma. Every time she talks she has to cough. Has been sick since before Jean, and not usually this sick.   Did have relief with nebulizer treatment in the ER on 1/11 so sent Albuterol solution and will order nebulizer machine on [employer]. Will also start Arnuity Ellipta 1 puff daily and follow-up with Dr. Ledesma early next week. Reviewed warning signs and ED precautions in great detail.

## 2024-01-14 NOTE — ED PROVIDER NOTES
[facility] Emergency Department Note  Patient: Yareli Lynch Age: 43 year old Sex: female      MRN: [id number]  : 3/23/1980    Patient Seen in: [facility]    History   No chief complaint on file.    Stated Complaint: SOB    History obtained from: Patient    Patient is a 43-year-old female with past medical history of hypertension, PCOS, obesity presenting today for evaluation of shortness of breath and cough.  Patient states that she is been having shortness of breath and a productive cough for the past 2 weeks.  States that cough is productive of brown sputum.  Denies any fevers.  She states that she is having burning pain in the center of her chest.  She states that she is seen in the emergency department several days ago for similar symptoms and was treated with nebs and steroids with improvement of symptoms but states that her symptoms worsened again last night prompting reevaluation in the emergency department.  States that she has no formal diagnosis of asthma or COPD.  States that she was told that she has reactive airway disease.    Review of Systems:  Review of Systems  Positive for stated complaint: SOB. Constitutional and vital signs reviewed. All other systems reviewed and negative except as noted above.    Patient History:  Past Medical History:   Diagnosis Date    Allergic rhinitis     Anxiety     DVT (deep vein thrombosis) in pregnancy 2021    Encounter for therapeutic drug monitoring 2023    Essential hypertension 2000    Gestational diabetes     High myopia 1986    History of IBS     History of PCOS     Hypertension     Infertility, female     Morbid obesity with BMI of 45.0-49.9, adult (HCC)     Obesity     PTSD (post-traumatic stress disorder)        Past Surgical History:   Procedure Laterality Date      2019, 2021    COLONOSCOPY  2018    IVF PACKAGE  2018    OTHER SURGICAL HISTORY      egg retrieval for IVF        Family History   Problem  Relation Age of Onset    Thyroid Disorder Father     Diabetes Father     Diabetes Mother     Hypertension Mother     No Known Problems Maternal Grandmother     No Known Problems Maternal Grandfather     Glaucoma Neg     Retinal detachment Neg     Macular degeneration Neg        Specific Social Determinants of Health:   Social History     Socioeconomic History    Marital status:    Occupational History    Occupation: health    Tobacco Use    Smoking status: Former    Smokeless tobacco: Never    Tobacco comments:     On and off   Vaping Use    Vaping Use: Never used   Substance and Sexual Activity    Alcohol use: No    Drug use: No    Sexual activity: Yes     Partners: Female     Comment: with wife    Other Topics Concern    Blood Transfusions No   Social History Narrative    No H/O abuse     Lives with wife Hallie           \[facility] [place] [facility]\""ARABELLA elements reviewed from today and agreed except as otherwise stated in HPI.    Physical Exam     ED Triage Vitals   BP 01/14/24 1102 (!) 179/100   Pulse 01/14/24 1057 117   Resp 01/14/24 1058 24   Temp 01/14/24 1058 98.2 °F (36.8 °C)   Temp src 01/14/24 1058 Oral   SpO2 01/14/24 1057 93 %   O2 Device 01/14/24 1057 None (Room air)       Current:BP (!) 180/79   Pulse 117   Temp 98.2 °F (36.8 °C) (Oral)   Resp (!) 31   Ht 160 cm (5' 3\")   Wt 125.2 kg   LMP 12/25/2023   SpO2 93%   BMI 48.89 kg/m²         Physical Exam  Constitutional:       General: She is not in acute distress.  HENT:      Head: Normocephalic and atraumatic.      Mouth/Throat:      Mouth: Mucous membranes are moist.   Eyes:      Extraocular Movements: Extraocular movements intact.   Cardiovascular:      Rate and Rhythm: Regular rhythm. Tachycardia present.      Heart sounds: Normal heart sounds.   Pulmonary:      Effort: Respiratory distress present.      Breath sounds: Wheezing present.      Comments: Tachypnea  Abdominal:      Palpations: Abdomen is soft.      Tenderness: There is no abdominal tenderness.    Skin:     General: Skin is warm and dry.      Capillary Refill: Capillary refill takes less than 2 seconds.      Findings: No rash.   Neurological:      General: No focal deficit present.      Mental Status: She is alert and oriented to person, place, and time.   Psychiatric:         Mood and Affect: Mood normal.         Behavior: Behavior normal.         ED Course   Labs:   Labs Reviewed   BASIC METABOLIC PANEL (8) - Abnormal; Notable for the following components:       Result Value    Glucose 136 (*)     Sodium 135 (*)     Potassium 2.8 (*)     Calcium, Total 8.6 (*)     All other components within normal limits   CBC W/ DIFFERENTIAL - Abnormal; Notable for the following components:    WBC 15.0 (*)     MCV 79.3 (*)     MCH 24.3 (*)     MCHC 30.7 (*)     RDW 15.5 (*)     Neutrophil Absolute Prelim 12.81 (*)     Neutrophil Absolute 12.81 (*)     All other components within normal limits   SARS-COV-2/FLU A AND B/RSV BY PCR (GENEXPERT) - Normal    Narrative:     This test is intended for the qualitative detection and differentiation of SARS-CoV-2, influenza A, influenza B, and respiratory syncytial virus (RSV) viral RNA in nasopharyngeal or nares swabs from individuals suspected of respiratory viral infection consistent with COVID-19 by their healthcare provider. Signs and symptoms of respiratory viral infection due to SARS-CoV-2, influenza, and RSV can be similar.    Test performed using the Xpert Xpress SARS-CoV-2/FLU/RSV (real time RT-PCR)  assay on the GeneXpert instrument, [employer], [place] [address].   This test is being used under the Food and Drug Administration's Emergency Use Authorization.    The authorized Fact Sheet for Healthcare Providers for this assay is available upon request from the laboratory.   CBC WITH DIFFERENTIAL WITH PLATELET    Narrative:     The following orders were created for panel order CBC With Differential With Platelet.  Procedure                               Abnormality          Status                     ---------                               -----------         ------                     CBC W/ DIFFERENTIAL[[id number]]          Abnormal            Final result                 Please view results for these tests on the individual orders.   POTASSIUM   RAINBOW DRAW LAVENDER   RAINBOW DRAW LIGHT GREEN   RAINBOW DRAW BLUE   RAINBOW DRAW GOLD   RAINBOW DRAW LAVENDER     Radiology findings:  I personally reviewed the images.   XR CHEST AP PORTABLE  (CPT=71045)    Result Date: 1/14/2024  CONCLUSION: No acute cardiopulmonary abnormality.    Dictated by (CST): Howard Peters MD on 1/14/2024 at 11:31 AM     Finalized by (CST): Howard Peters MD on 1/14/2024 at 11:31 AM           EKG as interpreted by me: Ventricular rate 106, normal sinus tachycardia, normal axis, no parable prolongation, narrow QRS, QTc 462 ms, no ST segment elevations or depressions, no abnormal T wave inversions  Cardiac Monitor: Interpreted by me.   Pulse Readings from Last 1 Encounters:   01/14/24 117   , sinus,     External non-ED records reviewed independently by me: Chest x-ray from 1/11/2024 at time of previous ED visit with no evidence of focal opacity to suggest pneumonia.    MDM   43-year-old female with past medical history of reactive airway disease, hypertension, PCOS, obesity presenting for evaluation of persistent shortness of breath.  Upon arrival to emergency department, patient was tachycardic, tachypneic with expiratory wheezing bilaterally but otherwise saturating well on room air.    Differential diagnoses considered includes, but is not limited to: Active airway disease, asthma, pneumonia, pleural effusions    Will obtain the following tests: CBC, BMP, COVID/flu/RSV panel, chest x-ray, EKG  Please see ED course for my independent review of these tests/imaging results.    Initial Medications/Therapeutics administered: Albuterol, Atrovent, Solu-Medrol, magnesium    Chronic conditions affecting care: Reactive  airway disease, hypertension, PCOS, obesity    Workup and medications considered but not ordered: None    Social Determinants of Health that impacted care: None     ED course: Labs with hypokalemia likely secondary to recent albuterol use.  Leukocytosis likely reactive to recent steroids.  I independently reviewed the chest x-ray images that show no evidence of focal opacity to suggest pneumonia.  Upon reevaluation after first hour-long DuoNeb treatment, patient noted to have persistent symptoms and continued with wheezing on repeat lung exam.  Second hour-long administered as well as IV magnesium.  After second hour-long, continued to have wheezing and dyspnea.  Will admit for further management.  I discussed patient's case with Dr. Barrera, pulmonology, who was made aware of new consult and endorsed patient's case to the [place] hospitalist, Dr. Rivera accepted patient for admission.        Disposition and Plan     Clinical Impression:  1. Moderate persistent reactive airway disease with acute exacerbation        Disposition:  Admit    Follow-up:  No follow-up provider specified.    Medications Prescribed:  Current Discharge Medication List          Hospital Problems       Present on Admission  Date Reviewed: 1/4/2024            ICD-10-CM Noted POA    * (Principal) Moderate persistent reactive airway disease with acute exacerbation J45.41 1/14/2024 Unknown        This note may have been created using voice dictation technology and may include inadvertent errors.      Falguni Chapman MD  Emergency Medicine

## 2024-01-14 NOTE — ED QUICK NOTES
Orders for admission, patient is aware of plan and ready to go upstairs. Any questions, please call ED RN Elizabeth at extension [phone].     Patient Covid vaccination status: Fully vaccinated     COVID Test Ordered in ED: SARS-CoV-2/Flu A and B/RSV by PCR (GeneXpert)    COVID Suspicion at Admission: N/A    Running Infusions:    albuterol Stopped (01/14/24 1245)    ipratropium Stopped (01/14/24 1245)    albuterol Stopped (01/14/24 1442)    ipratropium Stopped (01/14/24 1442)    None    Mental Status/LOC at time of transport: A&Ox4    Other pertinent information:   CIWA score: N/A   NIH score:  N/A

## 2024-01-14 NOTE — CONSULTS
[facility]    Report of Consultation    Yareli Lynch Patient Status:  Emergency    3/23/1980 MRN [id number]   Location [facility] EMERGENCY DEPARTMENT Attending Falguni Chapman MD   Hosp Day # 0 PCP Yo Winchester DO     Date of Admission:  2024    Reason for Consultation:   Dyspnea    History of Present Illness:   Patient is a 43-year-old female with past medical history significant for DVT, hypertension, prior nicotine dependence with 20-pack-year history who presents with chief complaint of ongoing dyspnea cough and some associated wheezing.  Cough started approximately 3 weeks ago which is productive in nature.  Denies significant fevers.  Had recent pulmonary function testing which was unremarkable.  Denies use of maintenance inhaler therapy.  Denies formal diagnosis of asthma.  Saturation stable.  Hemodynamically stable.    Past Medical History  Past Medical History:   Diagnosis Date    Allergic rhinitis     Anxiety     DVT (deep vein thrombosis) in pregnancy 2021    Encounter for therapeutic drug monitoring 2023    Essential hypertension     Gestational diabetes     High myopia 1986    History of IBS     History of PCOS     Hypertension     Infertility, female     Morbid obesity with BMI of 45.0-49.9, adult (HCC)     Obesity     PTSD (post-traumatic stress disorder)        Past Surgical History  Past Surgical History:   Procedure Laterality Date      2019, 2021    COLONOSCOPY  2018    IVF PACKAGE  2018    OTHER SURGICAL HISTORY      egg retrieval for IVF       Family History  Family History   Problem Relation Age of Onset    Thyroid Disorder Father     Diabetes Father     Diabetes Mother     Hypertension Mother     No Known Problems Maternal Grandmother     No Known Problems Maternal Grandfather     Glaucoma Neg     Retinal detachment Neg     Macular degeneration Neg        Social History  Social History     Socioeconomic History     Marital status:    Occupational History    Occupation: health    Tobacco Use    Smoking status: Former    Smokeless tobacco: Never    Tobacco comments:     On and off   Vaping Use    Vaping Use: Never used   Substance and Sexual Activity    Alcohol use: No    Drug use: No    Sexual activity: Yes     Partners: Female     Comment: with wife    Other Topics Concern    Blood Transfusions No           Current Medications:  Current Facility-Administered Medications   Medication Dose Route Frequency    albuterol (Ventolin) (2.5 MG/3ML) 0.083% nebulizer solution 10 mg  10 mg Nebulization Continuous    ipratropium (Atrovent) 0.02 % nebulizer solution 1 mg  1 mg Nebulization Continuous    albuterol (Ventolin) (2.5 MG/3ML) 0.083% nebulizer solution 10 mg  10 mg Nebulization Continuous    ipratropium (Atrovent) 0.02 % nebulizer solution 1 mg  1 mg Nebulization Continuous     (Not in a hospital admission)      Allergies  Allergies   Allergen Reactions    Lisinopril Coughing and UNKNOWN       Review of Systems:   Constitutional: denies fevers, chills, weakness, fatigue, recent illness  HEENT: denies headache, sore throat, vision loss  Cardio: denies chest pain, chest pressure, palpitations  Respiratory: dyspnea, cough, wheezing, denies hemoptysis   GI: denies nausea, vomiting, abdominal pain  : denies dysuria, hematuria  Musculoskeletal: denies arthralgia, myalgia  Integumentary: denies rash, itching  Neurological: denies syncope, weakness, dizziness,   Psychiatric: denies depression, anxiety  Hematologic: denies bruising        Physical Exam:   Blood pressure (!) 215/105, pulse 120, temperature 98.2 °F (36.8 °C), temperature source Oral, resp. rate (!) 32, height 5' 3\" (1.6 m), weight 276 lb (125.2 kg), last menstrual period 12/25/2023, SpO2 93%, not currently breastfeeding.    Constitutional: no acute distress  Eyes: PERRL  ENT: nares patent  Neck: neck supple, no JVD  Cardio: RRR, S1 S2  Respiratory:  Bilateral expiratory wheeze  GI: abdomen soft, non tender, active bowel souds, no organomegaly  Extremities: no clubbing, cyanosis, edema  Neurologic: no gross motor deficits  Skin: warm, dry    Results:   Laboratory Data  Lab Results   Component Value Date    WBC 15.0 (H) 01/14/2024    HGB 12.0 01/14/2024    HCT 39.1 01/14/2024    .0 01/14/2024    CREATSERUM 0.70 01/14/2024    BUN 11 01/14/2024     (L) 01/14/2024    K 2.8 (LL) 01/14/2024     01/14/2024    CO2 30.0 01/14/2024     (H) 01/14/2024    CA 8.6 (L) 01/14/2024    ALB 3.3 (L) 09/07/2023    ALKPHO 66 09/07/2023    TP 7.3 09/07/2023    AST 13 (L) 09/07/2023    ALT 19 09/07/2023    TSH 1.360 09/07/2023    B12 377 09/07/2023         Imaging  XR CHEST AP PORTABLE  (CPT=71045)    Result Date: 1/14/2024  CONCLUSION: No acute cardiopulmonary abnormality.    Dictated by (CST): Howard Peters MD on 1/14/2024 at 11:31 AM     Finalized by (CST): Howard Peters MD on 1/14/2024 at 11:31 AM           Assessment   1.  Acute bronchitis  2.  Possible reactive airway disease  3.  Prior nicotine dependence  4.  Acute respiratory distress  5.  Hypertension    Plan   -Patient presents with ongoing dyspnea cough and wheezing persisting over the course of last several days to weeks.  Seen in the emergency department recently on 1/11/2024 with ongoing worsening.  Discharged on albuterol and prednisone.  -Chest x-ray with no acute abnormality seen  -Recent pulmonary function testing reviewed from November 2023.  No significant obstruction seen.  Cannot rule out possible component of reactive airway disease.  -IV steroids and gradually wean  -Scheduled nebulizer treatments  -ICS/LABA  -Encouraged ongoing tobacco cessation  -Reviewed vitals, labs and imaging    Freddie Barrera DO  Pulmonary Critical Care Medicine  [facility]  1/14/2024  3:12 PM

## 2024-01-14 NOTE — H&P
[facility]  HISTORY AND PHYSICAL       Yareli Lynch Patient Status:  Emergency    3/23/1980  43 year old [facility] [id number]   Location  Attending Falguni Chapman MD     PCP Yo Winchester, DO     ASSESSMENT/PLAN    Acute bronchitis.  COVID, RSV, flu negative.  She has had sick contacts.  No fever.  Not septic.  No clear pneumonia on imaging.  -Pulmonology following  -Defer decision with antibiotics to them    Acute bronchospasm.  May have reactive airway disease despite normal PFTs a few months ago.  Failed outpatient oral steroids.  -IV Solu-Medrol  -DuoNebs  -Pulmonology following  -Supplemental oxygen if needed    Hypertensive emergency.  Blood pressures as high as 220 systolic with tachycardia, respiratory distress.  -IV hydralazine as needed  -IV Cardizem as needed  -Avoid IV beta-blockers if possible    Sinus tachycardia.  Reactive to steroids, nebulizers, respiratory distress, acute illness.  -IV Cardizem as above  -Treat underlying disorders    Hypokalemia.  May be due to steroid effect and or poor oral intake.  -Potassium supplements  -Repeat in the morning    Other problems  Morbid obesity BMI 48  Hypertension, see above  Steroid-induced leukocytosis      ----------------------------------  dvt prophylaxis: sc heparin  code status: full code  dispo: TBD    DATE OF ADMISSION: 24    CHIEF COMPLAINT: Shortness of breath    HISTORY OF PRESENT ILLNESS  This is a 43 year oldfemale with complaints of worsening shortness of breath.  Patient was seen in the ER  for similar complaints.  Discharged with inhalers, steroids.  She started to feel better but then last night felt much worse again.  She is quite short of breath and presented for evaluation.  She does smoke but recent PFTs were relatively normal.  No fevers.  Several sick contacts in the last couple of weeks.  No chest pain.  Symptoms exacerbated by activity, alleviated by rest.    PAST MEDICAL HISTORY  Past Medical History:    Diagnosis Date    Allergic rhinitis     Anxiety     DVT (deep vein thrombosis) in pregnancy 2021    Encounter for therapeutic drug monitoring 2023    Essential hypertension 2000    Gestational diabetes     High myopia 1986    History of IBS     History of PCOS     Hypertension     Infertility, female     Morbid obesity with BMI of 45.0-49.9, adult (HCC)     Obesity     PTSD (post-traumatic stress disorder)         PAST SURGICAL HISTORY  Past Surgical History:   Procedure Laterality Date      2019, 2021    COLONOSCOPY  2018    IVF PACKAGE  2018    OTHER SURGICAL HISTORY      egg retrieval for IVF       ALLERGIES   Lisinopril    MEDICATIONS  Patient's Medications   New Prescriptions    No medications on file   Previous Medications    ALBUTEROL (2.5 MG/3ML) 0.083% INHALATION NEBU SOLN    Take 3 mL (2.5 mg total) by nebulization every 4 (four) hours as needed for Wheezing or Shortness of Breath.    ALBUTEROL 108 (90 BASE) MCG/ACT INHALATION AERO SOLN    Inhale 1-2 puffs into the lungs every 4 to 6 hours as needed for Wheezing.    FLUTICASONE FUROATE (ARNUITY ELLIPTA) 100 MCG/ACT INHALATION AEROSOL POWDER, BREATH ACTIVATED    Inhale 1 puff into the lungs daily.    GUAIFENESIN-CODEINE 100-10 MG/5ML ORAL SOLUTION    Take 10 mL by mouth 3 (three) times daily as needed for cough.    HYDROCHLOROTHIAZIDE 25 MG ORAL TAB    Take 1 tablet (25 mg total) by mouth daily.    PREDNISONE 20 MG ORAL TAB    Take 2 tablets (40 mg total) by mouth daily for 5 days.    SEMAGLUTIDE (OZEMPIC, 0.25 OR 0.5 MG/DOSE,) 2 MG/3ML SUBCUTANEOUS SOLUTION PEN-INJECTOR    Inject 0.5 mg into the skin once a week.   Modified Medications    No medications on file   Discontinued Medications    No medications on file       SOCIAL HISTORY  Social History     Socioeconomic History    Marital status:    Occupational History    Occupation: health    Tobacco Use    Smoking status: Former    Smokeless  tobacco: Never    Tobacco comments:     On and off   Vaping Use    Vaping Use: Never used   Substance and Sexual Activity    Alcohol use: No    Drug use: No    Sexual activity: Yes     Partners: Female     Comment: with wife    Other Topics Concern    Blood Transfusions No       FAMILY HISTORY  Family History   Problem Relation Age of Onset    Thyroid Disorder Father     Diabetes Father     Diabetes Mother     Hypertension Mother     No Known Problems Maternal Grandmother     No Known Problems Maternal Grandfather     Glaucoma Neg     Retinal detachment Neg     Macular degeneration Neg        REVIEW OF SYSTEMS  Gen: Neg for chills, fever, diaphoresis and fatigue.  No weight loss or weight gain.  Endo: No heat or cold intolerance.  No polyuria.  HEENT: Neg for trouble swallowing, visual disturbance.  no hearing changes, no speech difficulties, no neck stiffness.  Resp: As above  CV: Neg for chest pain and palpitations.   GI: Neg for nausea, vomiting, diarrhea, abdominal pain/distention, constipation, blood in stool, black stools  : Neg for dysuria, frequency and hematuria.   MS :Neg for back pain and joint pain.  No swelling, open wounds  Skin: Neg for rash.   Neuro: Neg for dizziness, focal weakness, LH, HA.   Psych: Neg for suicidal ideas, confusion, agitation and depressed mood.   Other: All other review systems negative except what is mentioned in the HPI    PHYSICAL EXAM  Vital signs:  BP (!) 215/105   Pulse 120   Temp 98.2 °F (36.8 °C) (Oral)   Resp (!) 32   Ht 5' 3\" (1.6 m)   Wt 276 lb (125.2 kg)   LMP 12/25/2023   SpO2 93%   BMI 48.89 kg/m²   Gen: A+Ox3.  No distress.  Speaking in full sentences.  HEENT: NCAT, neck supple, no carotid bruit.  CV: Tachycardic, S1S2, and intact distal pulses. No gallop, rub, murmur.    Pulm: Modest air movement, diffuse expiratory wheezes in all lung fields.  No respiratory distress at the time of my evaluation  Abd: Soft, NTND, BS normal, no mass, no HSM, no  rebound/guarding.   Neuro: Normal reflexes, cranial nerves II through XII intact, strength is symmetric and 5 out of 5 throughout, sensory exam grossly intact, coordination and gait normal.  Skin: Skin is warm and dry. No rashes, erythema, diaphoresis.   MS: No open wounds, no joint effusions.  No edema  Psych: Normal mood and affect. Behavior and judgment normal.     Data:  Recent Labs   Lab 01/14/24  1115   RBC 4.93   HGB 12.0   HCT 39.1   MCV 79.3*   MCH 24.3*   MCHC 30.7*   RDW 15.5*   NEPRELIM 12.81*   WBC 15.0*   .0     Recent Labs   Lab 01/14/24  1115   *   BUN 11   CREATSERUM 0.70   CA 8.6*   *   K 2.8*      CO2 30.0       I personally reviewed the available laboratories, imaging including chest x-ray. I discussed the case with ER physician, pulmonology. I ordered laboratories, studies including BMP. I adjusted medications including Solu-Medrol. Medical decision making high, risk is high  >75min spent, >50% spent counseling and coordinating care in the form of educating pt/family and d/w consultants and staff. Most of the time spent discussing the above plan.

## 2024-01-14 NOTE — ED INITIAL ASSESSMENT (HPI)
Patient to ED dropped off by car from wife; cough started day before yovani +productive brown sputum states she was here Thursday for same issue but wasn't as SOB as she is today; worsened yesterday difficulty talking and taking a deep breath, has been taking her prescribed steroids. Hard to speak in full sentences, recently dx with asthma. Denies abdominal pain. +Chest pain in center of chest described as burning. +audible wheezing.

## 2024-01-15 ENCOUNTER — APPOINTMENT (OUTPATIENT)
Dept: CT IMAGING | Facility: HOSPITAL | Age: 44
End: 2024-01-15
Attending: STUDENT IN AN ORGANIZED HEALTH CARE EDUCATION/TRAINING PROGRAM
Payer: COMMERCIAL

## 2024-01-15 ENCOUNTER — APPOINTMENT (OUTPATIENT)
Dept: CV DIAGNOSTICS | Facility: HOSPITAL | Age: 44
End: 2024-01-15
Attending: STUDENT IN AN ORGANIZED HEALTH CARE EDUCATION/TRAINING PROGRAM
Payer: COMMERCIAL

## 2024-01-15 LAB
ANION GAP SERPL CALC-SCNC: 5 MMOL/L (ref 0–18)
ANION GAP SERPL CALC-SCNC: 9 MMOL/L (ref 0–18)
APTT PPP: 23.4 SECONDS (ref 23.3–35.6)
APTT PPP: 27.5 SECONDS (ref 23.3–35.6)
ATRIAL RATE: 119 BPM
ATRIAL RATE: 126 BPM
B-HCG UR QL: NEGATIVE
BASOPHILS # BLD AUTO: 0.02 X10(3) UL (ref 0–0.2)
BASOPHILS NFR BLD AUTO: 0.1 %
BNP SERPL-MCNC: 31 PG/ML
BUN BLD-MCNC: 11 MG/DL (ref 9–23)
BUN BLD-MCNC: 21 MG/DL (ref 9–23)
BUN/CREAT SERPL: 12.6 (ref 10–20)
BUN/CREAT SERPL: 20.8 (ref 10–20)
CALCIUM BLD-MCNC: 9.2 MG/DL (ref 8.7–10.4)
CALCIUM BLD-MCNC: 9.5 MG/DL (ref 8.7–10.4)
CHLORIDE SERPL-SCNC: 100 MMOL/L (ref 98–112)
CHLORIDE SERPL-SCNC: 98 MMOL/L (ref 98–112)
CHOLEST SERPL-MCNC: 160 MG/DL (ref ?–200)
CO2 SERPL-SCNC: 26 MMOL/L (ref 21–32)
CO2 SERPL-SCNC: 30 MMOL/L (ref 21–32)
CREAT BLD-MCNC: 0.87 MG/DL
CREAT BLD-MCNC: 1.01 MG/DL
DEPRECATED RDW RBC AUTO: 44.9 FL (ref 35.1–46.3)
DEPRECATED RDW RBC AUTO: 46.5 FL (ref 35.1–46.3)
EGFRCR SERPLBLD CKD-EPI 2021: 70 ML/MIN/1.73M2 (ref 60–?)
EGFRCR SERPLBLD CKD-EPI 2021: 84 ML/MIN/1.73M2 (ref 60–?)
EOSINOPHIL # BLD AUTO: 0.01 X10(3) UL (ref 0–0.7)
EOSINOPHIL NFR BLD AUTO: 0.1 %
ERYTHROCYTE [DISTWIDTH] IN BLOOD BY AUTOMATED COUNT: 15.9 % (ref 11–15)
ERYTHROCYTE [DISTWIDTH] IN BLOOD BY AUTOMATED COUNT: 16.2 % (ref 11–15)
EST. AVERAGE GLUCOSE BLD GHB EST-MCNC: 128 MG/DL (ref 68–126)
GLUCOSE BLD-MCNC: 172 MG/DL (ref 70–99)
GLUCOSE BLD-MCNC: 220 MG/DL (ref 70–99)
GLUCOSE BLDC GLUCOMTR-MCNC: 144 MG/DL (ref 70–99)
GLUCOSE BLDC GLUCOMTR-MCNC: 153 MG/DL (ref 70–99)
GLUCOSE BLDC GLUCOMTR-MCNC: 191 MG/DL (ref 70–99)
GLUCOSE BLDC GLUCOMTR-MCNC: 202 MG/DL (ref 70–99)
HBA1C MFR BLD: 6.1 % (ref ?–5.7)
HCT VFR BLD AUTO: 38.4 %
HCT VFR BLD AUTO: 38.5 %
HDLC SERPL-MCNC: 49 MG/DL (ref 40–59)
HGB BLD-MCNC: 11.9 G/DL
HGB BLD-MCNC: 12.2 G/DL
IMM GRANULOCYTES # BLD AUTO: 0.17 X10(3) UL (ref 0–1)
IMM GRANULOCYTES NFR BLD: 1 %
LDLC SERPL CALC-MCNC: 96 MG/DL (ref ?–100)
LYMPHOCYTES # BLD AUTO: 1.38 X10(3) UL (ref 1–4)
LYMPHOCYTES NFR BLD AUTO: 8.5 %
MCH RBC QN AUTO: 24.7 PG (ref 26–34)
MCH RBC QN AUTO: 24.7 PG (ref 26–34)
MCHC RBC AUTO-ENTMCNC: 30.9 G/DL (ref 31–37)
MCHC RBC AUTO-ENTMCNC: 31.8 G/DL (ref 31–37)
MCV RBC AUTO: 77.7 FL
MCV RBC AUTO: 80 FL
MONOCYTES # BLD AUTO: 0.5 X10(3) UL (ref 0.1–1)
MONOCYTES NFR BLD AUTO: 3.1 %
NEUTROPHILS # BLD AUTO: 14.18 X10 (3) UL (ref 1.5–7.7)
NEUTROPHILS # BLD AUTO: 14.18 X10(3) UL (ref 1.5–7.7)
NEUTROPHILS NFR BLD AUTO: 87.2 %
NONHDLC SERPL-MCNC: 111 MG/DL (ref ?–130)
OSMOLALITY SERPL CALC.SUM OF ELEC: 283 MOSM/KG (ref 275–295)
OSMOLALITY SERPL CALC.SUM OF ELEC: 286 MOSM/KG (ref 275–295)
P AXIS: 72 DEGREES
P AXIS: 80 DEGREES
P-R INTERVAL: 142 MS
P-R INTERVAL: 148 MS
PLATELET # BLD AUTO: 325 10(3)UL (ref 150–450)
PLATELET # BLD AUTO: 338 10(3)UL (ref 150–450)
POTASSIUM SERPL-SCNC: 3.6 MMOL/L (ref 3.5–5.1)
POTASSIUM SERPL-SCNC: 3.9 MMOL/L (ref 3.5–5.1)
Q-T INTERVAL: 324 MS
Q-T INTERVAL: 356 MS
QRS DURATION: 94 MS
QRS DURATION: 98 MS
QTC CALCULATION (BEZET): 469 MS
QTC CALCULATION (BEZET): 500 MS
R AXIS: -2 DEGREES
R AXIS: 1 DEGREES
RBC # BLD AUTO: 4.81 X10(6)UL
RBC # BLD AUTO: 4.94 X10(6)UL
SODIUM SERPL-SCNC: 133 MMOL/L (ref 136–145)
SODIUM SERPL-SCNC: 135 MMOL/L (ref 136–145)
T AXIS: 41 DEGREES
T AXIS: 47 DEGREES
TRIGL SERPL-MCNC: 77 MG/DL (ref 30–149)
TROPONIN I SERPL HS-MCNC: 478 NG/L
TROPONIN I SERPL HS-MCNC: 591 NG/L
VENTRICULAR RATE: 119 BPM
VENTRICULAR RATE: 126 BPM
VLDLC SERPL CALC-MCNC: 13 MG/DL (ref 0–30)
WBC # BLD AUTO: 16.3 X10(3) UL (ref 4–11)
WBC # BLD AUTO: 19.2 X10(3) UL (ref 4–11)

## 2024-01-15 PROCEDURE — 3044F HG A1C LEVEL LT 7.0%: CPT | Performed by: FAMILY MEDICINE

## 2024-01-15 PROCEDURE — 71260 CT THORAX DX C+: CPT | Performed by: STUDENT IN AN ORGANIZED HEALTH CARE EDUCATION/TRAINING PROGRAM

## 2024-01-15 PROCEDURE — 93306 TTE W/DOPPLER COMPLETE: CPT | Performed by: STUDENT IN AN ORGANIZED HEALTH CARE EDUCATION/TRAINING PROGRAM

## 2024-01-15 PROCEDURE — 99233 SBSQ HOSP IP/OBS HIGH 50: CPT | Performed by: PHYSICIAN ASSISTANT

## 2024-01-15 PROCEDURE — 99233 SBSQ HOSP IP/OBS HIGH 50: CPT | Performed by: HOSPITALIST

## 2024-01-15 RX ORDER — POTASSIUM CHLORIDE 20 MEQ/1
40 TABLET, EXTENDED RELEASE ORAL EVERY 4 HOURS
Qty: 4 TABLET | Refills: 0 | Status: COMPLETED | OUTPATIENT
Start: 2024-01-15 | End: 2024-01-15

## 2024-01-15 RX ORDER — ASPIRIN 81 MG/1
324 TABLET, CHEWABLE ORAL ONCE
Status: COMPLETED | OUTPATIENT
Start: 2024-01-16 | End: 2024-01-16

## 2024-01-15 RX ORDER — BENZONATATE 100 MG/1
100 CAPSULE ORAL 3 TIMES DAILY PRN
Status: DISCONTINUED | OUTPATIENT
Start: 2024-01-15 | End: 2024-01-17

## 2024-01-15 RX ORDER — HEPARIN SODIUM 1000 [USP'U]/ML
3000 INJECTION, SOLUTION INTRAVENOUS; SUBCUTANEOUS ONCE
Status: COMPLETED | OUTPATIENT
Start: 2024-01-15 | End: 2024-01-15

## 2024-01-15 RX ORDER — DILTIAZEM HYDROCHLORIDE 60 MG/1
60 TABLET, FILM COATED ORAL EVERY 6 HOURS SCHEDULED
Status: DISCONTINUED | OUTPATIENT
Start: 2024-01-15 | End: 2024-01-17

## 2024-01-15 RX ORDER — CHLORHEXIDINE GLUCONATE 4 G/100ML
30 SOLUTION TOPICAL
Status: COMPLETED | OUTPATIENT
Start: 2024-01-16 | End: 2024-01-16

## 2024-01-15 RX ORDER — HEPARIN SODIUM AND DEXTROSE 10000; 5 [USP'U]/100ML; G/100ML
INJECTION INTRAVENOUS CONTINUOUS
Status: DISCONTINUED | OUTPATIENT
Start: 2024-01-15 | End: 2024-01-17

## 2024-01-15 RX ORDER — ALPRAZOLAM 0.5 MG/1
0.5 TABLET ORAL ONCE
Status: COMPLETED | OUTPATIENT
Start: 2024-01-15 | End: 2024-01-15

## 2024-01-15 RX ORDER — HEPARIN SODIUM 1000 [USP'U]/ML
5000 INJECTION, SOLUTION INTRAVENOUS; SUBCUTANEOUS ONCE
Status: COMPLETED | OUTPATIENT
Start: 2024-01-15 | End: 2024-01-15

## 2024-01-15 RX ORDER — MORPHINE SULFATE 2 MG/ML
2 INJECTION, SOLUTION INTRAMUSCULAR; INTRAVENOUS EVERY 4 HOURS PRN
Status: DISCONTINUED | OUTPATIENT
Start: 2024-01-15 | End: 2024-01-17

## 2024-01-15 RX ORDER — PREDNISONE 20 MG/1
40 TABLET ORAL
Status: DISCONTINUED | OUTPATIENT
Start: 2024-01-15 | End: 2024-01-17

## 2024-01-15 RX ORDER — SODIUM CHLORIDE 9 MG/ML
INJECTION, SOLUTION INTRAVENOUS
Status: COMPLETED | OUTPATIENT
Start: 2024-01-16 | End: 2024-01-16

## 2024-01-15 RX ORDER — HEPARIN SODIUM AND DEXTROSE 10000; 5 [USP'U]/100ML; G/100ML
1000 INJECTION INTRAVENOUS ONCE
Status: COMPLETED | OUTPATIENT
Start: 2024-01-15 | End: 2024-01-15

## 2024-01-15 NOTE — PLAN OF CARE
Notified by nursing staff that patient's second troponin noted to be elevated 478 (1st troponin on admission 31, 2nd troponin 45 (obtained after rapid response), 3rd troponin 478), remains tachycardic -120s with elevated blood pressure.  Stat EKG ordered and on my interpretation reveals sinus tachycardia, no ST segment elevations or depressions.  On my evaluation, patient denies any chest pain but is still short of breath, no hypoxia, on room air with SpO2 > 90, does not want to receive any more steroids.  Reports headache that earlier was relieved by morphine.  She also reports history of provoked DVT during her pregnancy and was then placed on anticoagulation for short period of time, no longer on any anticoagulation.  She also states that patient was evaluated by cardiology (Dr. Graham) a couple months ago as she has planned to undergo bariatric surgery.  She was supposed to follow up with cardiology for abnormal myocardial perfusion study but patient has not done so.     A/P  Will obtain stat CTA chest to rule out PE given her ongoing tachycardia/tachypnea (history of provoked DVT and recently has been sedentary due to not feeling well/mostly sitting in her chair and now w/ acute illness)  Will also obtain complete echocardiogram of the heart and consult cardiology due to ongoing tachycardia as well as elevated troponin level and history of abnormal myocardial perfusion study.  Hold IV fluids

## 2024-01-15 NOTE — SIGNIFICANT EVENT
RRT    *See RRT Documentation Record*    Reason the RRT was called: Jaw pain Hr sustaining 130's  Assessment of patient leading up to RRT: Patient complained of hear palpitations and chest pressure  Interventions/Testing: EKG, D-Dimer, Troponin, IV morphine  Patient Outcome/Disposition: remained on unit   Family Notified: yes  Name of family notified: Hallie

## 2024-01-15 NOTE — PROGRESS NOTES
On call Nocturnist 01/14/24  Rapid response called for chest and jaw pain, on and off since 5 pm.  HR has been elevated and bp elevated since admission, being addressed by hospitalist service.  Patient just received neb.  's.  Pain described as a tightness.     BP (!) 168/84   Pulse (!) 124   Temp 98.2 °F (36.8 °C) (Oral)   Resp (!) 31   Ht 5' 3\" (1.6 m)   Wt 276 lb (125.2 kg)   LMP 12/25/2023   SpO2 93%   BMI 48.89 kg/m²   Patient awake alert no sig distress  Lungs-dec air movement no sig acc use, no wheezing  Cv-tachy, regular  Neuro-alert oriented no focal deficits    A/p-chest pain and tachycardia likely due to bronchitis/reactive airway and medications  -stat ekg, labs  -notify primary service/

## 2024-01-15 NOTE — PROGRESS NOTES
[facility]    Progress Note    Yareli Lynch Patient Status:  Inpatient    3/23/1980 MRN [id number]   Location [facility]5W Attending Jarek Saleh MD   Hosp Day # 1 PCP Yo Winchester DO     Subjective:   Seen and examined while resting in bed. Had chest pressure and palpitations overnight. Shortness of breath improved. Has ongoing cough with minimal yellow phlegm. Still wheezing. Palpitations with nebs. No fever or chills. Appetite is decreased. On room air.    Objective:   Blood pressure 149/78, pulse 114, temperature 97.7 °F (36.5 °C), temperature source Oral, resp. rate 20, height 5' 3\" (1.6 m), weight 276 lb (125.2 kg), last menstrual period 2023, SpO2 95%, not currently breastfeeding.  Physical Exam  Vitals and nursing note reviewed.   Constitutional:       General: She is awake. She is not in acute distress.     Appearance: Normal appearance. She is obese. She is not ill-appearing.   HENT:      Head: Normocephalic and atraumatic.   Cardiovascular:      Rate and Rhythm: Regular rhythm. Tachycardia present.   Pulmonary:      Effort: No respiratory distress.      Breath sounds: No wheezing, rhonchi or rales.      Comments: Diminished breath sounds bilaterally  Musculoskeletal:      Cervical back: Normal range of motion and neck supple.      Right lower leg: Edema present.      Left lower leg: Edema present.   Skin:     General: Skin is warm and dry.   Neurological:      General: No focal deficit present.      Mental Status: She is alert and oriented to person, place, and time.   Psychiatric:         Mood and Affect: Mood is anxious.         Behavior: Behavior is cooperative.       Results:   Lab Results   Component Value Date    WBC 16.3 (H) 01/15/2024    HGB 12.2 01/15/2024    HCT 38.4 01/15/2024    .0 01/15/2024    CREATSERUM 0.87 01/15/2024    BUN 11 01/15/2024     (L) 01/15/2024    K 3.6 01/15/2024     01/15/2024    CO2 26.0 01/15/2024     (H)  01/15/2024    CA 9.2 01/15/2024    ALB 3.3 (L) 09/07/2023    ALKPHO 66 09/07/2023    BILT 0.2 09/07/2023    TP 7.3 09/07/2023    AST 13 (L) 09/07/2023    ALT 19 09/07/2023    TSH 1.360 09/07/2023    DDIMER <0.27 01/14/2024    TROPHS 591 (HH) 01/15/2024    B12 377 09/07/2023       CT CHEST PE AORTA (IV ONLY) (CPT=71260)    Result Date: 1/15/2024  CONCLUSION:   1. No acute pulmonary embolism. 2. No pneumonia, pleural effusion, or pneumothorax. 3. A 10 mm left lower lobe calcified granuloma. 4. Small airways disease. 5. Lesser incidental findings described above.     Dictated by (CST): New Causey MD on 1/15/2024 at 7:47 AM     Finalized by (CST): New Causey MD on 1/15/2024 at 7:57 AM          CT BRAIN OR HEAD (09154)    Result Date: 1/14/2024  CONCLUSION:  1.  No acute intracranial process. 2.  Fluid throughout the left mastoid air cells.  Correlate for left-sided mastoiditis/otomastoiditis.    Dictated by (CST): Howard Peters MD on 1/14/2024 at 8:21 PM     Finalized by (CST): Howard Peters MD on 1/14/2024 at 8:22 PM          XR CHEST AP PORTABLE  (CPT=71045)    Result Date: 1/14/2024  CONCLUSION: No acute cardiopulmonary abnormality.    Dictated by (CST): Howard Peters MD on 1/14/2024 at 11:31 AM     Finalized by (CST): Howard Peters MD on 1/14/2024 at 11:31 AM         EKG 12 Lead    Result Date: 1/15/2024  Sinus tachycardia Minimal voltage criteria for LVH, may be normal variant ( [place] product ) Borderline ECG When compared with ECG of 14-JAN-2024 10:59, No significant change was found    EKG 12 Lead    Result Date: 1/15/2024  Sinus tachycardia Left atrial enlargement Minimal voltage criteria for LVH, may be normal variant ( [place]o product ) Borderline ECG When compared with ECG of 14-JAN-2024 21:23, No significant change was found Confirmed by ROSA OHARA JORDAN ([id number]) on 1/15/2024 9:57:23 AM    EKG 12 Lead    Result Date: 1/14/2024  Sinus tachycardia Minimal voltage criteria for LVH, may be normal  variant ( [place] product ) Borderline ECG When compared with ECG of 09-MAR-2023 14:06, No significant change was found Confirmed by ROSA BRASWELL, DAPHNE ([id number]) on 1/14/2024 3:08:27 PM     Assessment & Plan:   Acute bronchitis  ?Asthma  CT chest with peribronchial thickening, no infiltrate  PFTs normal  COVID-19/influenza/RSV negative  Plan:  -Prednisone  -Nebs  -Breo  -Hold on antibiotics    Chest pain  Serial troponin positive  EKG sinus tachycardia  CTA chest no PE  H/o abnormal nuclear stress test with small fixed anterior defect  Plan:  -F/u Echocardiogram  -Cardiology opinion    YAMILE  Severe  Uses CPAP at home  Plan:  -CPAP nightly    Hypertension emergency  Better this morning  Plan:  -As per primary service    DVT prophylaxis: Hailee Rader PA-C  Pulmonary Medicine  1/15/2024

## 2024-01-15 NOTE — IMAGING NOTE
10/5/23 MPI   Stress test impression   Stress EKG is normal.    No arrhythmia or ectopy noted.    Pt denied chest pain with testing    Baseline HTN noted     Nuclear Impression     This is an abnormal perfusion study.    The study quality is poor.    The left ventricular cavity is noted to be markedly enlarged on the stress study. The left ventricular ejection fraction was calculated to be 46% and left ventricular global function is mildly reduced.    Small size mild intensity mostly fixed anterior defect.

## 2024-01-15 NOTE — PLAN OF CARE
2100: Rapid response called for hypertension and tachycardia with jaw pain.   0430: Elevated trop with no improvement of BP or HR; MD notified came to bedside to evaluate patient - see new orders.  Patient refusing solumedrol due to side effects.     Problem: Patient Centered Care  Goal: Patient preferences are identified and integrated in the patient's plan of care  Description: Interventions:  - What would you like us to know as we care for you? From home with wife  - Provide timely, complete, and accurate information to patient/family  - Incorporate patient and family knowledge, values, beliefs, and cultural backgrounds into the planning and delivery of care  - Encourage patient/family to participate in care and decision-making at the level they choose  - Honor patient and family perspectives and choices  Outcome: Progressing     Problem: Patient/Family Goals  Goal: Patient/Family Long Term Goal  Description: Patient's Long Term Goal: to go home    Interventions:  - follow md orders  - See additional Care Plan goals for specific interventions  Outcome: Progressing  Goal: Patient/Family Short Term Goal  Description: Patient's Short Term Goal: manage sob    Interventions:   - follow md orders  - See additional Care Plan goals for specific interventions  Outcome: Progressing

## 2024-01-15 NOTE — PROGRESS NOTES
[facility]  Hospitalist Progress Note     Yareli Lynch Patient Status:  Inpatient    3/23/1980  43 year old CSN [id number]   Location 521/521-A Attending Jarek Saleh MD   Hosp Day # 1 PCP Yo Winchester,      Assessment & Plan:   ----------------------------------  Acute bronchitis.  COVID, RSV, flu negative.  She has had sick contacts.  No fever.  Not septic.  No clear pneumonia on imaging.  -Pulmonology following  -Defer decision with antibiotics to them     Acute bronchospasm.  May have reactive airway disease despite normal PFTs a few months ago.  Failed outpatient oral steroids. Much better today, pt does not want to be on high dose steroids  -dc IV Solu-Medrol, start pred 40  -DuoNebs dc'd, cont atrovent  -Pulmonology following  -Supplemental oxygen if needed    Possible NSTEMI. Type unknown. Vs related to extreme HTN and tachycardia. Previous abnormal stress test in .  -Cardiology consult  -Tele  -Echo     Hypertensive emergency.  Blood pressures as high as 220 systolic with tachycardia, respiratory distress. Now much better since resp improvement  -IV hydralazine as needed  -IV Cardizem as needed  -Avoid IV beta-blockers if possible     Sinus tachycardia.  Reactive to steroids, nebulizers, respiratory distress, acute illness.  -IV Cardizem as above  -Treat underlying disorders     Hypokalemia.  May be due to steroid effect and or poor oral intake.  -Potassium supplements  -Repeat in the morning     Other problems  Morbid obesity BMI 48  Hypertension, see above  Steroid-induced leukocytosis     ----------------------------------  dvt prophylaxis: sc heparin  code status: full code  dispo: TBD       I personally reviewed the available laboratories, imaging including cxr. I discussed/will discuss the case with pulm. I ordered laboratories, studies including cbc. I adjusted medications including prednisone. Medical decision making high, risk is high.    >55min spent, >50% spent  counseling and coordinating care in the form of educating pt/family and d/w consultants and staff. Most of the time spent discussing the above plan.       Subjective:   ----------------------------------  Feeling MUCH better. Breathing easier. HA and CP resolved now that BP and HR has come down.      Objective:   Chief Complaint: No chief complaint on file.    ----------------------------------  Temp:  [97.7 °F (36.5 °C)-98.4 °F (36.9 °C)] 97.9 °F (36.6 °C)  Pulse:  [102-128] 117  Resp:  [18-32] 18  BP: (135-215)/() 135/66  SpO2:  [93 %-100 %] 95 %  Gen: A+Ox3.  No distress.   HEENT: NCAT, neck supple, no carotid bruit.  CV: RRR, tachycardia resolved, S1S2, and intact distal pulses. No gallop, rub, murmur.  Pulm: lung sounds are much more clear, less wheezing and better air movement  Abd: Soft, NTND, BS normal, no mass, no HSM, no rebound/guarding.   Neuro: Normal reflexes, CN. Sensory/motor exams grossly normal deficit.   MS: No joint effusions.  No peripheral edema.  Skin: Skin is warm and dry. No rashes, erythema, diaphoresis.   Psych: Normal mood and affect. Calm, cooperative    Labs:  Lab Results   Component Value Date    HGB 12.2 01/15/2024    WBC 16.3 (H) 01/15/2024    .0 01/15/2024     (L) 01/15/2024    K 3.6 01/15/2024    CREATSERUM 0.87 01/15/2024    AST 13 (L) 09/07/2023    ALT 19 09/07/2023          predniSONE  40 mg Oral Daily with breakfast    fluticasone furoate-vilanterol  1 puff Inhalation Daily    enoxaparin  0.5 mg/kg Subcutaneous Nightly    amLODIPine  10 mg Oral Daily    losartan  25 mg Oral Q24H    aspirin  325 mg Oral Daily    insulin aspart  1-7 Units Subcutaneous TID CC    ipratropium-albuterol  3 mL Nebulization QID     aspirin-acetaminophen-caffeine, morphINE, guaiFENesin-codeine, hydrALAzine, dilTIAZem, polyethylene glycol (PEG 3350), sennosides, bisacodyl, fleet enema, ondansetron, prochlorperazine, glucose **OR** glucose **OR** glucose-vitamin C **OR** dextrose **OR**  glucose **OR** glucose **OR** glucose-vitamin C

## 2024-01-15 NOTE — HISTORICAL OFFICE NOTE
Continuity of Care Document  10/25/2023  Yareli LYNCH - 43 y.o. Female; born [birth year]Summary of episode note, generated on Nov. 08, 2023November 08, 2023   CHIEF COMPLAINT    CHIEF COMPLAINT  Reason for Visit/Chief Complaint   Follow up   Ms. Lynch is here for follow-up of tests ordered at her initial consultation on 9/7/2023. She is a 43-year-old female being evaluated preoperatively for planned bariatric surgery, specifically the gastric sleeve. Be done by Dr. Rueda at [facility]. She has no history of heart disease but does have risk factors for CAD.We ordered a regadenoson nuclear stress test. That study showed a small size mild intensity fixed anterior defect. There was marked enlargement of the left ventricle and the left ventricle ejection fraction was 46%.An echocardiogram however showed normal left ventricular size and contractility with an ejection fraction of 58%. There was moderate LVH. The echo was otherwise unremarkable.     PROBLEMS  Reconcile with Patient's ChartPROBLEMS  Problem Effective Dates Date resolved Problem Status   Pre-op testing, [SNOMED-CT: 455961279] 9/7/2023 - Active   Hypertension (HTN), primary, [SNOMED-CT: 38403580] 9/5/2023 - Active     ENCOUNTER DIAGNOSIS    ENCOUNTER DIAGNOSIS  Problem Effective Dates Date resolved Problem Status   Abnormal nuclear stress test, [SNOMED-CT: 777990657] 10/26/2023 - Active     VITAL SIGNS    VITAL SIGNS  Date / Time: 10/26/2023   BP Systolic 134 mmHg   BP Diastolic 78 mmHg   Height 63 inches   Weight 288 lbs   Pulse Rate 103 bpm   BSA (Body Surface Area) 2.5 cc/m2   BMI (Body Mass Index) 51 cc/m2   Blood Pressure 134 / 78 mmHg     PHYSICAL EXAMINATION    PHYSICAL EXAMINATION  Header Details   Constitutional 96%o2   Vitals Left Arm Sitting  / 78 mmHg, Pulse rate 103 bpm, Regular, Height in 5' 3\", BMI: 51, Weight in 288.81 lbs (or) 131 kgs, BSA : 2.49 cc/m²   General Appearance No Acute Distress    Head/Eyes/Ears/Nose/Mouth/Throat Mucous membranes Moist   Neck No carotid bruits, No JVD   Respiratory Unlabored, Lungs clear with normal breath sounds   Cardiovascular Intact distal pulses, Regular rhythm. Normal rate present. Normal and normal S1 and S2   Gastrointestinal Abdomen soft, Non-tender, Normoactive bowel sounds   Lower Extremities Pulses 2+ and equal bilaterally, No edema   Skin Warm and dry   Neurologic / Psychiatric Alert and Oriented     ALLERGIES, ADVERSE REACTIONS, ALERTS    ALLERGIES, ADVERSE REACTIONS, ALERTS  Type Substance Reaction Severity Status   Allergies lisinopril - Ingredient cough Moderate Active     MEDICATIONS ADMINISTERED DURING VISIT    No data available    MEDICATIONS  Reconcile with Patient's ChartMEDICATIONS  Medication Start Date Route/Frequency Status   hydroCHLOROthiazide 25 mg tablet, [RxNorm: 094790] 9/5/2023 Take 1/2 tablet PO daily x7 days, then 1 tablet PO daily thereafter Active   Ozempic 0.25 mg or 0.5 mg (2 mg/3 mL) subcutaneous pen injector, [RxNorm: 4926219] 10/26/2023 Once a week Active     ASSESSMENT    Assessment:  1. Contradictory results on the echo and stress test. Uncertain as to whether patient has underlying CAD and what LV function is. Needs further testing.Plan:  1. Coronary CT angiogram soon.2. Final cardiac risk assessment will be dictated once the results of that test are available.3. We will call the patient with results and any further recommendations.     FAMILY HISTORY    FAMILY HISTORY  Relationship Age Diagnosis   Mother 0 Hypertension (HTN), primary     GENERAL STATUS    No data available    PAST MEDICAL HISTORY    PAST MEDICAL HISTORY  Problem Date diagonsed Date resolved Status   Pre-op testing, [SNOMED-CT: 222084894] 9/7/2023 - Active   Hypertension (HTN), primary, [SNOMED-CT: 22468486] 9/5/2023 - Active     HISTORY OF PRESENT ILLNESS    Ms. Lynch is here for follow-up of tests ordered at her initial consultation on 9/7/2023. She is a  43-year-old female being evaluated preoperatively for planned bariatric surgery, specifically the gastric sleeve. Be done by Dr. Rueda at [facility]. She has no history of heart disease but does have risk factors for CAD.We ordered a regadenoson nuclear stress test. That study showed a small size mild intensity fixed anterior defect. There was marked enlargement of the left ventricle and the left ventricle ejection fraction was 46%.An echocardiogram however showed normal left ventricular size and contractility with an ejection fraction of 58%. There was moderate LVH. The echo was otherwise unremarkable.     IMMUNIZATIONS    No data available    PLAN OF CARE    PLAN OF CARE  Planned Care Date   CTA Gated Coronary with Calcium Scoring 1/1/1900   Referral Visit - Yo Winchester ([email or web address]) : 1/4/2024     PROCEDURES    No data available    RESULTS    RESULTS  Name Result Date Location - Ordered By   Myocardial Perfusion Imaging 1.Stress EKG is normal. 2.No arrhythmia or ectopy noted. 3.Pt denied chest pain with testing 4.Baseline HTN noted 1.This is an abnormal perfusion study. 2.The study quality is poor. 3.The left ventricular cavity is noted to be markedly enlarged on the stress study. The left ventricular ejection fraction was calculated to be 46% and left ventricular global function is mildly reduced. 4.Small size mild intensity mostly fixed anterior defect. 10/5/2023 9:30:00 AM Cm Rothman   Trans Thoracic Echocardiogram 1.The study quality is average. 2.The left ventricle is normal in size and global systolic function. The left ventricular ejection fraction is 58%. Left ventricular diastolic function is indeterminate. There is moderate concentric left ventricular hypertrophy. No regional wall motion abnormalities noted.3.The right ventricle is normal in size. Right ventricular systolic function is normal. 4.No significant valvular regurgitation or stenosis noted. 10/5/2023 8:00:00  Rosalinda Duran     REVIEW OF SYSTEMS    REVIEW OF SYSTEMS  Header Details   Cardiovascular Edema  No history of Chest pain, GONG, Palpitations, Syncope, PND, Orthopnea, Claudication   Musculoskeletal No history of Myalgias, Arthritis   Respiratory No history of SOB, Wheezing, Sputum   Neurological No history of Migraines, Numbness, Limb weakness   Hem/Lymphatic No history of Easy bruising, Blood clots, Hx of blood transfusion, Anemia, Bleeding problems     SOCIAL HISTORY    SOCIAL HISTORY  Social History Element Description Effective Dates   Smoking status Never smoked -     FUNCTIONAL STATUS    No data available    MEDICAL EQUIPMENT    No data available    Goals Sections    No data available    REASON FOR REFERRAL           Health Concerns Section    No data available    COGNITIVE/MENTAL STATUS    No data available    Patient Demographics    Patient Demographics  Patient Address Communication Language Race / Ethnicity Marital Status   [facility] [place] [address] [phone] (Mobile) English (Preferred) Unknown /  or       Document Information    Primary Care Provider Other Service Providers Document Coverage Dates   Gladys Rollins  NPI: [id number]  [phone] (Work)  [address], [address], [place] [address]  [place] [address]  Interpreting Physicians  [facility]  [phone] (Work)  [address] [place] [address] Nils Carcamo  NPI: [id number]  [phone] (Work)  [address], [address], [place] [address]  [place] [address]  Nurses     Les Lobato  NPI: [id number]  [phone] (Work)  [address], [address], [place] [address]  [place] [address]  Nurses     Verona Villanueva  NPI: [id number]  [phone] (Work)  [address], [address], [place] [address]  [place] [address]  Nurses Oct. 27, 2023October 27, 2023      Organization   [facility]  [place]  [phone] (Work)  [address], [address], [place] [address]  [place] [address]     Encounter Providers Encounter Date    Oct. 26, 2023October 26, 2023     Legal Authenticator    Ayo Graham  NPI: [id number]  [phone] (Work)  [address], [address], [place] [address]  [place] [address]

## 2024-01-15 NOTE — CONSULTS
Cardiology (consult dictated) a    Assessment:  1.  Admission for respiratory insufficiency, bronchospasm, reactive airway disease.  Improved but still symptomatic.    2.  Intermittent chest pressure with elevated troponin.  Recent stress test showing small fixed apical defect of uncertain significance.  We attempted to get a coronary CTA but it could not get done because of tachycardia.  Need definitive test to rule out CAD.  No chest pain at present.  Normal LV systolic function on echo from today.    3.  Persistent hypertension and tachycardia.  Rule out pheochromocytoma.      Plan:  1.  Stop amlodipine and begin diltiazem to blunt tachycardia.    2.  Left heart cath in a.m.    3.  Begin heparin drip for now.    4.  Workup for pheo.      Thank you.

## 2024-01-15 NOTE — PLAN OF CARE
Pt arrived to floor AO x 4 BP elevated complaining of headache able to state needs. Call light in place. Pt is on RA. Notified Dr. Saleh of BP see orders for BP meds. Pt states headache improved now that BP is slightly better.     2116: Pt complained of jaw pain and HR sustaining in 120's 130's RRT called EKG D-Dimer, Troponin, morphine given spouse Hallie updated. See RRT note.       Problem: Patient Centered Care  Goal: Patient preferences are identified and integrated in the patient's plan of care  Description: Interventions:  - What would you like us to know as we care for you?   - Provide timely, complete, and accurate information to patient/family  - Incorporate patient and family knowledge, values, beliefs, and cultural backgrounds into the planning and delivery of care  - Encourage patient/family to participate in care and decision-making at the level they choose  - Honor patient and family perspectives and choices  Outcome: Progressing     Problem: Patient/Family Goals  Goal: Patient/Family Long Term Goal  Description: Patient's Long Term Goal: to go home    Interventions:    - See additional Care Plan goals for specific interventions  Outcome: Progressing  Goal: Patient/Family Short Term Goal  Description: Patient's Short Term Goal: To breath better    Interventions:     - See additional Care Plan goals for specific interventions  Outcome: Progressing

## 2024-01-15 NOTE — PLAN OF CARE
Pt currently up to chair.  Ct ch completed, negative x PE, negative x acute.  Echocardiogram completed bedside.  Pulm and cardio following.  Pt states she feels much better than when she came in.  No longer hypertensive, headache has resolved.  Lung sounds are improved, began coughing up mucous.  Tolerating room air, appetite fair.  Pt remains tachycardic due to therapies, asymptomatic, will continue to monitor.     Problem: RESPIRATORY - ADULT  Goal: Achieves optimal ventilation and oxygenation  Description: INTERVENTIONS:  - Assess for changes in respiratory status  - Assess for changes in mentation and behavior  - Position to facilitate oxygenation and minimize respiratory effort  - Oxygen supplementation based on oxygen saturation or ABGs  - Provide Smoking Cessation handout, if applicable  - Encourage broncho-pulmonary hygiene including cough, deep breathe, Incentive Spirometry  - Assess the need for suctioning and perform as needed  - Assess and instruct to report SOB or any respiratory difficulty  - Respiratory Therapy support as indicated  - Manage/alleviate anxiety  - Monitor for signs/symptoms of CO2 retention  1/15/2024 1450 by Dianna England RN  Outcome: Progressing  1/15/2024 1449 by Dianna England, RN  Outcome: Progressing     Problem: CARDIOVASCULAR - ADULT  Goal: Maintains optimal cardiac output and hemodynamic stability  Description: INTERVENTIONS:  - Monitor vital signs, rhythm, and trends  - Monitor for bleeding, hypotension and signs of decreased cardiac output  - Evaluate effectiveness of vasoactive medications to optimize hemodynamic stability  - Monitor arterial and/or venous puncture sites for bleeding and/or hematoma  - Assess quality of pulses, skin color and temperature  - Assess for signs of decreased coronary artery perfusion - ex. Angina  - Evaluate fluid balance, assess for edema, trend weights  Outcome: Progressing  Goal: Absence of cardiac arrhythmias or at  baseline  Description: INTERVENTIONS:  - Continuous cardiac monitoring, monitor vital signs, obtain 12 lead EKG if indicated  - Evaluate effectiveness of antiarrhythmic and heart rate control medications as ordered  - Initiate emergency measures for life threatening arrhythmias  - Monitor electrolytes and administer replacement therapy as ordered  Outcome: Progressing

## 2024-01-16 ENCOUNTER — APPOINTMENT (OUTPATIENT)
Dept: INTERVENTIONAL RADIOLOGY/VASCULAR | Facility: HOSPITAL | Age: 44
End: 2024-01-16
Attending: INTERNAL MEDICINE
Payer: COMMERCIAL

## 2024-01-16 PROBLEM — I21.4 NSTEMI (NON-ST ELEVATED MYOCARDIAL INFARCTION) (HCC): Status: ACTIVE | Noted: 2024-01-16

## 2024-01-16 LAB
ANION GAP SERPL CALC-SCNC: 3 MMOL/L (ref 0–18)
APTT PPP: 33.8 SECONDS (ref 23.3–35.6)
APTT PPP: 38.2 SECONDS (ref 23.3–35.6)
BASOPHILS # BLD AUTO: 0.03 X10(3) UL (ref 0–0.2)
BASOPHILS NFR BLD AUTO: 0.2 %
BUN BLD-MCNC: 20 MG/DL (ref 9–23)
BUN/CREAT SERPL: 27.4 (ref 10–20)
CALCIUM BLD-MCNC: 8.6 MG/DL (ref 8.7–10.4)
CHLORIDE SERPL-SCNC: 105 MMOL/L (ref 98–112)
CO2 SERPL-SCNC: 28 MMOL/L (ref 21–32)
CREAT BLD-MCNC: 0.73 MG/DL
DEPRECATED RDW RBC AUTO: 46 FL (ref 35.1–46.3)
EGFRCR SERPLBLD CKD-EPI 2021: 104 ML/MIN/1.73M2 (ref 60–?)
EOSINOPHIL # BLD AUTO: 0.18 X10(3) UL (ref 0–0.7)
EOSINOPHIL NFR BLD AUTO: 1.1 %
ERYTHROCYTE [DISTWIDTH] IN BLOOD BY AUTOMATED COUNT: 16.1 % (ref 11–15)
GLUCOSE BLD-MCNC: 128 MG/DL (ref 70–99)
GLUCOSE BLDC GLUCOMTR-MCNC: 119 MG/DL (ref 70–99)
GLUCOSE BLDC GLUCOMTR-MCNC: 133 MG/DL (ref 70–99)
HCT VFR BLD AUTO: 35.6 %
HGB BLD-MCNC: 11.6 G/DL
IMM GRANULOCYTES # BLD AUTO: 0.13 X10(3) UL (ref 0–1)
IMM GRANULOCYTES NFR BLD: 0.8 %
LYMPHOCYTES # BLD AUTO: 3.1 X10(3) UL (ref 1–4)
LYMPHOCYTES NFR BLD AUTO: 19.2 %
MCH RBC QN AUTO: 25.8 PG (ref 26–34)
MCHC RBC AUTO-ENTMCNC: 32.6 G/DL (ref 31–37)
MCV RBC AUTO: 79.3 FL
MONOCYTES # BLD AUTO: 1.02 X10(3) UL (ref 0.1–1)
MONOCYTES NFR BLD AUTO: 6.3 %
NEUTROPHILS # BLD AUTO: 11.7 X10 (3) UL (ref 1.5–7.7)
NEUTROPHILS # BLD AUTO: 11.7 X10(3) UL (ref 1.5–7.7)
NEUTROPHILS NFR BLD AUTO: 72.4 %
OSMOLALITY SERPL CALC.SUM OF ELEC: 286 MOSM/KG (ref 275–295)
PLATELET # BLD AUTO: 296 10(3)UL (ref 150–450)
POTASSIUM SERPL-SCNC: 3.2 MMOL/L (ref 3.5–5.1)
POTASSIUM SERPL-SCNC: 3.9 MMOL/L (ref 3.5–5.1)
RBC # BLD AUTO: 4.49 X10(6)UL
SODIUM SERPL-SCNC: 136 MMOL/L (ref 136–145)
WBC # BLD AUTO: 16.2 X10(3) UL (ref 4–11)

## 2024-01-16 PROCEDURE — 4A023N7 MEASUREMENT OF CARDIAC SAMPLING AND PRESSURE, LEFT HEART, PERCUTANEOUS APPROACH: ICD-10-PCS | Performed by: INTERNAL MEDICINE

## 2024-01-16 PROCEDURE — B2151ZZ FLUOROSCOPY OF LEFT HEART USING LOW OSMOLAR CONTRAST: ICD-10-PCS | Performed by: INTERNAL MEDICINE

## 2024-01-16 PROCEDURE — 99233 SBSQ HOSP IP/OBS HIGH 50: CPT | Performed by: HOSPITALIST

## 2024-01-16 PROCEDURE — 99232 SBSQ HOSP IP/OBS MODERATE 35: CPT | Performed by: PHYSICIAN ASSISTANT

## 2024-01-16 PROCEDURE — B2111ZZ FLUOROSCOPY OF MULTIPLE CORONARY ARTERIES USING LOW OSMOLAR CONTRAST: ICD-10-PCS | Performed by: INTERNAL MEDICINE

## 2024-01-16 RX ORDER — HEPARIN SODIUM 1000 [USP'U]/ML
INJECTION, SOLUTION INTRAVENOUS; SUBCUTANEOUS
Status: COMPLETED
Start: 2024-01-16 | End: 2024-01-16

## 2024-01-16 RX ORDER — POTASSIUM CHLORIDE 20 MEQ/1
40 TABLET, EXTENDED RELEASE ORAL EVERY 4 HOURS
Status: COMPLETED | OUTPATIENT
Start: 2024-01-16 | End: 2024-01-16

## 2024-01-16 RX ORDER — MIDAZOLAM HYDROCHLORIDE 1 MG/ML
INJECTION INTRAMUSCULAR; INTRAVENOUS
Status: COMPLETED
Start: 2024-01-16 | End: 2024-01-16

## 2024-01-16 RX ORDER — NITROGLYCERIN 20 MG/100ML
INJECTION INTRAVENOUS
Status: COMPLETED
Start: 2024-01-16 | End: 2024-01-16

## 2024-01-16 RX ORDER — ACETAMINOPHEN 325 MG/1
650 TABLET ORAL EVERY 6 HOURS PRN
Status: DISCONTINUED | OUTPATIENT
Start: 2024-01-16 | End: 2024-01-17

## 2024-01-16 RX ORDER — LIDOCAINE HYDROCHLORIDE 20 MG/ML
INJECTION, SOLUTION EPIDURAL; INFILTRATION; INTRACAUDAL; PERINEURAL
Status: COMPLETED
Start: 2024-01-16 | End: 2024-01-16

## 2024-01-16 RX ORDER — HEPARIN SODIUM 1000 [USP'U]/ML
3000 INJECTION, SOLUTION INTRAVENOUS; SUBCUTANEOUS ONCE
Status: COMPLETED | OUTPATIENT
Start: 2024-01-16 | End: 2024-01-16

## 2024-01-16 RX ORDER — VERAPAMIL HYDROCHLORIDE 2.5 MG/ML
INJECTION, SOLUTION INTRAVENOUS
Status: COMPLETED
Start: 2024-01-16 | End: 2024-01-16

## 2024-01-16 NOTE — CONSULTS
[facility]    PATIENT'S NAME: YNES MAST   ATTENDING PHYSICIAN: Jarek Saleh MD   CONSULTING PHYSICIAN: Ayo Graham MD   PATIENT ACCOUNT#:   [id number]    LOCATION:  5[facility]  MEDICAL RECORD #:   [id number]       YOB: 1980  ADMISSION DATE:       01/14/2024      CONSULT DATE:  01/15/2024    REPORT OF CONSULTATION    HISTORY OF PRESENT ILLNESS:  The patient is a pleasant 43-year-old female who was admitted to the hospital yesterday after 3 weeks of an upper respiratory illness with a productive cough.  Four days ago she began to develop progressive weakness and shortness of breath.  She came to the emergency room and was treated in the ER for these symptoms.  She felt better and was discharged home.  She was readmitted yesterday with recurrence of the symptoms as well as a sense of racing forceful heartbeat that occurred after both nebulizer and steroids.  This occurred all night.  Her blood pressure was severely elevated, and she had headache and jaw pain as well as a sense of pressure in the anterior chest.  All of the above have resolved as the blood pressure has become under better control.  She remains mildly tachycardic.    She has no prior cardiac history.  I initially saw her a few months ago for preop cardiac evaluation for planned bariatric surgery.  We ordered an echocardiogram, which reported normal LV size and contractility and moderate LVH.  A nuclear study showed a small size, mild intensity, fixed anterior defect.  That study also reported marked enlargement of the left ventricle and an ejection fraction of 46%.  I asked for a cardiac CT angiogram, but it could not get done because of inability to slow the patient's heart rate adequately.    PAST MEDICAL HISTORY:  Hypertension, morbid obesity.    MEDICATIONS:  Home medications:  Arnuity Ellipta, albuterol inhaler, prednisone, Ozempic, hydrochlorothiazide 25 mg daily, vitamin D.    ALLERGIES:  Lisinopril, which  caused a cough.    SOCIAL HISTORY:  Nonsmoker.  No excessive alcohol.    PHYSICAL EXAMINATION:    GENERAL:  Well-developed, well-nourished female in no acute distress.  Alert and oriented x3.  VITAL SIGNS:  Blood pressure has been variable, as high as 203/100 and most recently 135/66.  Pressure seemed to respond best to amlodipine.  Respirations 18; pulse 117, regular rhythm; saturation 95% on room air; afebrile.    HEENT:  Unremarkable.  NECK:  Supple.  Jugular venous pressure normal.  Carotids brisk without bruits.  No thyromegaly.  LUNGS:  Clear.  HEART:  S1, S2 normal.  No gallop, murmur, rub, or click.  ABDOMEN:  Benign.  EXTREMITIES:  Warm and dry.  Good pulses.  No edema.      LABORATORY DATA:  Troponin has increased on serial draws from 31 to 45 to 478 to 591.  Sodium 135, potassium 3.6, bicarb 26, BUN 11, creatinine 0.87.  White count 16.3, platelets 325, hemoglobin 12.2, MCV 77.7.  Review of previous data shows that in September there was iron deficiency.      This admission reveals an EKG showing sinus tachycardia with LVH by voltage but no acute ischemic changes.    Chest x-ray shows no abnormality.    ASSESSMENT:    1.   Respiratory insufficiency, prolonged upper respiratory illness, cough, bronchospasm.  Improved but still symptomatic.  2.   Intermittent chest pressure with elevated troponin.  Recent stress test showing small fixed defect, uncertain significance.  Need definitive tests to rule out coronary artery disease.  It does not appear that we will be able to get a CTA.  Currently without chest pain.  Blood pressure better controlled.    3.   Persistent hypertension and tachycardia.  Consider pheochromocytoma.    PLAN:    1.   Stop amlodipine and begin diltiazem to blunt the tachycardia.   2.   Left heart catheterization in the morning.  The risks, benefits, and alternatives were explained to the patient, who expresses understanding and agrees to proceed.   3.   Begin heparin drip.  4.   Workup  for pheochromocytoma.    Thank you for this consultation.    Dictated By Ayo Graham MD  d: 01/15/2024 15:37:41  t: 01/15/2024 16:16:06  [facility] [id number]  [facility]/

## 2024-01-16 NOTE — PLAN OF CARE
Patient currently waiting x cardiac cath.  Cardiac NP bedside speaking with patient about her reservations due to how bad her cough is. Cough strong and productive.  Primary aware & alerted cardiology.  Tachycardia and hypertension substantially improved from yesterday.  Remains on heparin gtt.  Wife at bedside, pleasant affect.      Problem: RESPIRATORY - ADULT  Goal: Achieves optimal ventilation and oxygenation  Description: INTERVENTIONS:  - Assess for changes in respiratory status  - Assess for changes in mentation and behavior  - Position to facilitate oxygenation and minimize respiratory effort  - Oxygen supplementation based on oxygen saturation or ABGs  - Provide Smoking Cessation handout, if applicable  - Encourage broncho-pulmonary hygiene including cough, deep breathe, Incentive Spirometry  - Assess the need for suctioning and perform as needed  - Assess and instruct to report SOB or any respiratory difficulty  - Respiratory Therapy support as indicated  - Manage/alleviate anxiety  - Monitor for signs/symptoms of CO2 retention  Outcome: Progressing     Problem: CARDIOVASCULAR - ADULT  Goal: Maintains optimal cardiac output and hemodynamic stability  Description: INTERVENTIONS:  - Monitor vital signs, rhythm, and trends  - Monitor for bleeding, hypotension and signs of decreased cardiac output  - Evaluate effectiveness of vasoactive medications to optimize hemodynamic stability  - Monitor arterial and/or venous puncture sites for bleeding and/or hematoma  - Assess quality of pulses, skin color and temperature  - Assess for signs of decreased coronary artery perfusion - ex. Angina  - Evaluate fluid balance, assess for edema, trend weights  Outcome: Progressing  Goal: Absence of cardiac arrhythmias or at baseline  Description: INTERVENTIONS:  - Continuous cardiac monitoring, monitor vital signs, obtain 12 lead EKG if indicated  - Evaluate effectiveness of antiarrhythmic and heart rate control medications as  ordered  - Initiate emergency measures for life threatening arrhythmias  - Monitor electrolytes and administer replacement therapy as ordered  Outcome: Progressing

## 2024-01-16 NOTE — PLAN OF CARE
Problem: RESPIRATORY - ADULT  Goal: Achieves optimal ventilation and oxygenation  Description: INTERVENTIONS:  - Assess for changes in respiratory status  - Assess for changes in mentation and behavior  - Position to facilitate oxygenation and minimize respiratory effort  - Oxygen supplementation based on oxygen saturation or ABGs  - Provide Smoking Cessation handout, if applicable  - Encourage broncho-pulmonary hygiene including cough, deep breathe, Incentive Spirometry  - Assess the need for suctioning and perform as needed  - Assess and instruct to report SOB or any respiratory difficulty  - Respiratory Therapy support as indicated  - Manage/alleviate anxiety  - Monitor for signs/symptoms of CO2 retention  Outcome: Progressing     Problem: CARDIOVASCULAR - ADULT  Goal: Maintains optimal cardiac output and hemodynamic stability  Description: INTERVENTIONS:  - Monitor vital signs, rhythm, and trends  - Monitor for bleeding, hypotension and signs of decreased cardiac output  - Evaluate effectiveness of vasoactive medications to optimize hemodynamic stability  - Monitor arterial and/or venous puncture sites for bleeding and/or hematoma  - Assess quality of pulses, skin color and temperature  - Assess for signs of decreased coronary artery perfusion - ex. Angina  - Evaluate fluid balance, assess for edema, trend weights  Outcome: Progressing  Goal: Absence of cardiac arrhythmias or at baseline  Description: INTERVENTIONS:  - Continuous cardiac monitoring, monitor vital signs, obtain 12 lead EKG if indicated  - Evaluate effectiveness of antiarrhythmic and heart rate control medications as ordered  - Initiate emergency measures for life threatening arrhythmias  - Monitor electrolytes and administer replacement therapy as ordered  Outcome: Progressing     Vitals stable. Heparin drip titrated per protocol. Patient NPO for cardiac cath today. No c/o pain.

## 2024-01-16 NOTE — PROGRESS NOTES
[facility]    Progress Note    Yareli Lynch Patient Status:  Inpatient    3/23/1980 MRN [id number]   Location [facility]5W Attending Jarek Saleh MD   Hosp Day # 2 PCP Yo Winchester,      Subjective:   Seen and examined while resting in bed. Feels slightly worse today with productive cough with yellow phlegm. Still wheezing. Complains of dyspnea. No chest pain or palpitations today. No fever or chills. On room air. Did not tolerate CPAP overnight due to cough.    Objective:   Blood pressure 145/65, pulse 100, temperature 98.6 °F (37 °C), temperature source Oral, resp. rate 18, height 5' 3\" (1.6 m), weight 276 lb (125.2 kg), last menstrual period 2023, SpO2 95%, not currently breastfeeding.  Physical Exam  Vitals and nursing note reviewed.   Constitutional:       General: She is awake. She is not in acute distress.     Appearance: Normal appearance. She is obese. She is not ill-appearing.   HENT:      Head: Normocephalic and atraumatic.   Cardiovascular:      Rate and Rhythm: Normal rate and regular rhythm.   Pulmonary:      Effort: No respiratory distress.      Breath sounds: Wheezing present. No rhonchi or rales.      Comments: Coughing throughout exam  Musculoskeletal:      Cervical back: Normal range of motion and neck supple.      Right lower leg: No edema.      Left lower leg: No edema.   Skin:     General: Skin is warm and dry.   Neurological:      General: No focal deficit present.      Mental Status: She is alert and oriented to person, place, and time.   Psychiatric:         Mood and Affect: Mood is anxious.         Behavior: Behavior is cooperative.       Results:   Lab Results   Component Value Date    WBC 16.2 (H) 2024    HGB 11.6 (L) 2024    HCT 35.6 2024    .0 2024    CREATSERUM 0.73 2024    BUN 20 2024     2024    K 3.2 (L) 2024     2024    CO2 28.0 2024     (H) 2024     CA 8.6 (L) 01/16/2024    ALB 3.3 (L) 09/07/2023    ALKPHO 66 09/07/2023    BILT 0.2 09/07/2023    TP 7.3 09/07/2023    AST 13 (L) 09/07/2023    ALT 19 09/07/2023    PTT 38.2 (H) 01/16/2024    TSH 1.360 09/07/2023    DDIMER <0.27 01/14/2024    TROPHS 591 (HH) 01/15/2024    B12 377 09/07/2023       CT CHEST PE AORTA (IV ONLY) (CPT=71260)    Result Date: 1/15/2024  CONCLUSION:   1. No acute pulmonary embolism. 2. No pneumonia, pleural effusion, or pneumothorax. 3. A 10 mm left lower lobe calcified granuloma. 4. Small airways disease. 5. Lesser incidental findings described above.     Dictated by (CST): [place] MD on 1/15/2024 at 7:47 AM     Finalized by (CST): [place] Causey MD on 1/15/2024 at 7:57 AM          CT BRAIN OR HEAD (85750)    Result Date: 1/14/2024  CONCLUSION:  1.  No acute intracranial process. 2.  Fluid throughout the left mastoid air cells.  Correlate for left-sided mastoiditis/otomastoiditis.    Dictated by (CST): Howard Peters MD on 1/14/2024 at 8:21 PM     Finalized by (CST): Howard Peters MD on 1/14/2024 at 8:22 PM         EKG 12 Lead    Result Date: 1/15/2024  Sinus tachycardia Minimal voltage criteria for LVH, may be normal variant ( Herman product ) Borderline ECG When compared with ECG of 14-JAN-2024 10:59, No significant change was found Confirmed by ANNI PHILIP ([id number]) on 1/15/2024 12:16:01 PM    EKG 12 Lead    Result Date: 1/15/2024  Sinus tachycardia Left atrial enlargement Minimal voltage criteria for LVH, may be normal variant ( Herman product ) Borderline ECG When compared with ECG of 14-JAN-2024 21:23, No significant change was found Confirmed by ROSA OHARA JORDAN ([id number]) on 1/15/2024 9:57:23 AM     Assessment & Plan:   Acute bronchitis  ?Asthma  CT chest with peribronchial thickening, no infiltrate  PFTs normal  COVID-19/influenza/RSV negative  Plan:  -Prednisone  -Nebs  -Breo  -Hold on antibiotics    Chest pain  Serial troponin positive  EKG sinus tachycardia  CTA  chest no PE  H/o abnormal nuclear stress test with small fixed anterior defect  Echo EF 65-70%  Seen by cardiology - started on heparin gtt and plans for LHC today  Plan:  -Heparin gtt  -LHC per cardiology    YMAILE  Severe  Uses CPAP at home  Plan:  -CPAP nightly    Hypertension emergency  Better  Plan:  -Work-up for pheochromocytoma    DVT prophylaxis: Lovenox    D/w RN.    Bruno Rader PA-C  Pulmonary Medicine  1/16/2024

## 2024-01-16 NOTE — PROGRESS NOTES
[facility]  Hospitalist Progress Note     Yareli Lynch Patient Status:  Inpatient    3/23/1980  43 year old CSN [id number]   Location 521/521-A Attending Jarek Saleh MD   Hosp Day # 2 PCP Yo Winchester, DO     Assessment & Plan:   ----------------------------------  Acute bronchitis.  COVID, RSV, flu negative.  She has had sick contacts.  No fever.  Not septic.  No clear pneumonia on imaging.  -Pulmonology following  -Defer decision with antibiotics to them     Acute bronchospasm.  May have reactive airway disease despite normal PFTs a few months ago.  Failed outpatient oral steroids. Much better today, pt does not want to be on high dose steroids  -cont pred 40  -DuoNebs dc'd, cont atrovent  -Pulmonology following  -Supplemental oxygen if needed    Possible NSTEMI. Type unknown. Vs related to extreme HTN and tachycardia. Previous abnormal stress test in .  -Cardiology consult appreciated  -Tele  -Echo noted  -Cardiac cath, timing per cardiology  -Heparin IV     Hypertensive emergency.  Blood pressures as high as 220 systolic with tachycardia, respiratory distress. Now much better since resp improvement  -IV hydralazine as needed  -IV Cardizem as needed  -Avoid IV beta-blockers if possible     Sinus tachycardia.  Reactive to steroids, nebulizers, respiratory distress, acute illness.  -IV Cardizem as above  -Treat underlying disorders     Hypokalemia.  May be due to steroid effect and or poor oral intake.  -Potassium supplements  -Repeat in the morning     Other problems  Morbid obesity BMI 48  Hypertension, see above  Steroid-induced leukocytosis     ----------------------------------  dvt prophylaxis: sc heparin  code status: full code  dispo: TBD       I personally reviewed the available laboratories, imaging including echo. I discussed/will discuss the case with pulm, cardiology APN. I ordered laboratories, studies including cbc. I adjusted medications including prednisone. Medical  decision making high, risk is high.    >55min spent, >50% spent counseling and coordinating care in the form of educating pt/family and d/w consultants and staff. Most of the time spent discussing the above plan.       Subjective:   ----------------------------------  Breathing is still easier, but she seems to be coughing more and bringing up more mucus.  No chest pain.  No headache.  Agreeable to cardiac catheterization, but she is worried about her coughing during this procedure      Objective:   Chief Complaint: No chief complaint on file.    ----------------------------------  Temp:  [97.6 °F (36.4 °C)-98.6 °F (37 °C)] 98.6 °F (37 °C)  Pulse:  [] 100  Resp:  [18] 18  BP: (138-148)/(57-82) 145/65  SpO2:  [92 %-96 %] 95 %  Gen: A+Ox3.  No distress.   HEENT: NCAT, neck supple, no carotid bruit.  CV: RRR, tachycardia resolved, S1S2, and intact distal pulses. No gallop, rub, murmur.  Pulm: lung sounds mild to moderate expiratory wheezes in all lung fields.  Good air movement.  Speaking in full sentences  Abd: Soft, NTND, BS normal, no mass, no HSM, no rebound/guarding.   Neuro: Normal reflexes, CN. Sensory/motor exams grossly normal deficit.   MS: No joint effusions.  No peripheral edema.  Skin: Skin is warm and dry. No rashes, erythema, diaphoresis.   Psych: Normal mood and affect. Calm, cooperative    Labs:  Lab Results   Component Value Date    HGB 11.6 (L) 01/16/2024    WBC 16.2 (H) 01/16/2024    .0 01/16/2024     01/16/2024    K 3.2 (L) 01/16/2024    CREATSERUM 0.73 01/16/2024    AST 13 (L) 09/07/2023    ALT 19 09/07/2023          predniSONE  40 mg Oral Daily with breakfast    dilTIAZem  60 mg Oral 4 times per day    aspirin  324 mg Oral Once    fluticasone furoate-vilanterol  1 puff Inhalation Daily    losartan  25 mg Oral Q24H    aspirin  325 mg Oral Daily    insulin aspart  1-7 Units Subcutaneous TID CC    ipratropium-albuterol  3 mL Nebulization QID     aspirin-acetaminophen-caffeine,  morphINE, guaiFENesin, benzonatate, hydrALAzine, dilTIAZem, polyethylene glycol (PEG 3350), sennosides, bisacodyl, fleet enema, ondansetron, prochlorperazine, glucose **OR** glucose **OR** glucose-vitamin C **OR** dextrose **OR** glucose **OR** glucose **OR** glucose-vitamin C

## 2024-01-17 ENCOUNTER — TELEPHONE (OUTPATIENT)
Facility: CLINIC | Age: 44
End: 2024-01-17

## 2024-01-17 VITALS
WEIGHT: 276.38 LBS | HEIGHT: 63 IN | RESPIRATION RATE: 18 BRPM | HEART RATE: 114 BPM | BODY MASS INDEX: 48.97 KG/M2 | SYSTOLIC BLOOD PRESSURE: 158 MMHG | TEMPERATURE: 99 F | DIASTOLIC BLOOD PRESSURE: 83 MMHG | OXYGEN SATURATION: 98 %

## 2024-01-17 LAB
ANION GAP SERPL CALC-SCNC: 5 MMOL/L (ref 0–18)
BASOPHILS # BLD AUTO: 0.02 X10(3) UL (ref 0–0.2)
BASOPHILS NFR BLD AUTO: 0.2 %
BUN BLD-MCNC: 20 MG/DL (ref 9–23)
BUN/CREAT SERPL: 26.7 (ref 10–20)
CALCIUM BLD-MCNC: 8.4 MG/DL (ref 8.7–10.4)
CHLORIDE SERPL-SCNC: 106 MMOL/L (ref 98–112)
CO2 SERPL-SCNC: 27 MMOL/L (ref 21–32)
CREAT BLD-MCNC: 0.75 MG/DL
DEPRECATED RDW RBC AUTO: 49.5 FL (ref 35.1–46.3)
EGFRCR SERPLBLD CKD-EPI 2021: 101 ML/MIN/1.73M2 (ref 60–?)
EOSINOPHIL # BLD AUTO: 0.47 X10(3) UL (ref 0–0.7)
EOSINOPHIL NFR BLD AUTO: 3.6 %
ERYTHROCYTE [DISTWIDTH] IN BLOOD BY AUTOMATED COUNT: 16.6 % (ref 11–15)
GLUCOSE BLD-MCNC: 99 MG/DL (ref 70–99)
GLUCOSE BLDC GLUCOMTR-MCNC: 106 MG/DL (ref 70–99)
GLUCOSE BLDC GLUCOMTR-MCNC: 152 MG/DL (ref 70–99)
HCT VFR BLD AUTO: 35.4 %
HGB BLD-MCNC: 10.8 G/DL
IMM GRANULOCYTES # BLD AUTO: 0.09 X10(3) UL (ref 0–1)
IMM GRANULOCYTES NFR BLD: 0.7 %
LYMPHOCYTES # BLD AUTO: 3.76 X10(3) UL (ref 1–4)
LYMPHOCYTES NFR BLD AUTO: 29 %
MCH RBC QN AUTO: 25 PG (ref 26–34)
MCHC RBC AUTO-ENTMCNC: 30.5 G/DL (ref 31–37)
MCV RBC AUTO: 81.9 FL
MONOCYTES # BLD AUTO: 0.79 X10(3) UL (ref 0.1–1)
MONOCYTES NFR BLD AUTO: 6.1 %
NEUTROPHILS # BLD AUTO: 7.84 X10 (3) UL (ref 1.5–7.7)
NEUTROPHILS # BLD AUTO: 7.84 X10(3) UL (ref 1.5–7.7)
NEUTROPHILS NFR BLD AUTO: 60.4 %
OSMOLALITY SERPL CALC.SUM OF ELEC: 289 MOSM/KG (ref 275–295)
PLATELET # BLD AUTO: 271 10(3)UL (ref 150–450)
POTASSIUM SERPL-SCNC: 3.9 MMOL/L (ref 3.5–5.1)
RBC # BLD AUTO: 4.32 X10(6)UL
SODIUM SERPL-SCNC: 138 MMOL/L (ref 136–145)
WBC # BLD AUTO: 13 X10(3) UL (ref 4–11)

## 2024-01-17 PROCEDURE — 99232 SBSQ HOSP IP/OBS MODERATE 35: CPT | Performed by: PHYSICIAN ASSISTANT

## 2024-01-17 PROCEDURE — 99239 HOSP IP/OBS DSCHRG MGMT >30: CPT | Performed by: HOSPITALIST

## 2024-01-17 RX ORDER — ALBUTEROL SULFATE 2.5 MG/3ML
2.5 SOLUTION RESPIRATORY (INHALATION) EVERY 4 HOURS PRN
Qty: 120 ML | Refills: 0 | Status: SHIPPED | OUTPATIENT
Start: 2024-01-17

## 2024-01-17 RX ORDER — LOSARTAN POTASSIUM 25 MG/1
25 TABLET ORAL EVERY 24 HOURS
Qty: 30 TABLET | Refills: 0 | Status: SHIPPED | OUTPATIENT
Start: 2024-01-17

## 2024-01-17 RX ORDER — DILTIAZEM HYDROCHLORIDE 240 MG/1
240 CAPSULE, COATED, EXTENDED RELEASE ORAL DAILY
Qty: 30 CAPSULE | Refills: 0 | Status: SHIPPED | OUTPATIENT
Start: 2024-01-18 | End: 2024-01-17

## 2024-01-17 RX ORDER — ERGOCALCIFEROL 1.25 MG/1
50000 CAPSULE ORAL WEEKLY
Status: SHIPPED | COMMUNITY
Start: 2024-01-17 | End: 2024-01-19 | Stop reason: ALTCHOICE

## 2024-01-17 RX ORDER — BUDESONIDE AND FORMOTEROL FUMARATE DIHYDRATE 160; 4.5 UG/1; UG/1
2 AEROSOL RESPIRATORY (INHALATION) 2 TIMES DAILY
Qty: 1 EACH | Refills: 0 | Status: SHIPPED | OUTPATIENT
Start: 2024-01-18

## 2024-01-17 RX ORDER — BENZONATATE 100 MG/1
100 CAPSULE ORAL 3 TIMES DAILY PRN
Status: SHIPPED | COMMUNITY
Start: 2024-01-17 | End: 2024-01-19 | Stop reason: ALTCHOICE

## 2024-01-17 RX ORDER — DILTIAZEM HYDROCHLORIDE 240 MG/1
240 CAPSULE, COATED, EXTENDED RELEASE ORAL DAILY
Status: DISCONTINUED | OUTPATIENT
Start: 2024-01-17 | End: 2024-01-17

## 2024-01-17 RX ORDER — DILTIAZEM HYDROCHLORIDE 240 MG/1
240 CAPSULE, COATED, EXTENDED RELEASE ORAL DAILY
Qty: 30 CAPSULE | Refills: 0 | Status: SHIPPED | OUTPATIENT
Start: 2024-01-18

## 2024-01-17 RX ORDER — FLUTICASONE FUROATE AND VILANTEROL 200; 25 UG/1; UG/1
1 POWDER RESPIRATORY (INHALATION) DAILY
Qty: 1 EACH | Refills: 0 | Status: SHIPPED | OUTPATIENT
Start: 2024-01-18 | End: 2024-01-17

## 2024-01-17 RX ORDER — PREDNISONE 10 MG/1
TABLET ORAL
Qty: 12 TABLET | Refills: 0 | Status: SHIPPED | OUTPATIENT
Start: 2024-01-18 | End: 2024-01-19

## 2024-01-17 NOTE — PROGRESS NOTES
[facility] - CARDIOLOGY PROGRESS NOTE  Yareli Lynch Patient Status:  Inpatient    3/23/1980 MRN [id number]   Location [facility] 3W/SW Attending Melisa Tomas MD   Hosp Day # 3 PCP Yo Winchester,      Assessment:    Normal coronary arteries. No further cardiac w/u needed.    Hypertension and tachycardia. Better on diltizem.    Plan:    Change to long acting diltiazem    OK for discharge from cardiac standpoint.      Subjective:  No chest pain or shortness of breath.    Objective:  /75 (BP Location: Left arm)   Pulse 88   Temp 98.5 °F (36.9 °C) (Oral)   Resp 18   Ht 160 cm (5' 3\")   Wt 276 lb 6.4 oz (125.4 kg)   LMP 2023   SpO2 98%   BMI 48.96 kg/m²     Temp (24hrs), Av.3 °F (36.8 °C), Min:98 °F (36.7 °C), Max:98.6 °F (37 °C)      Intake/Output:  No intake or output data in the 24 hours ending 24 1114    Wt Readings from Last 2 Encounters:   24 276 lb 6.4 oz (125.4 kg)   24 276 lb (125.2 kg)       Physical Exam:    General: Alert and oriented x 3,  No apparent distress.  HEENT: No focal deficits.  Neck: No JVD, carotids 2+ no bruits.  Cardiac: Regular rate and rhythm, S1, S2 normal, no murmur  Lungs: Clear without wheezes, rales, rhonchi.  Normal excursions and effort.  Abdomen: Soft, non-tender.   Extremities: Without clubbing, cyanosis or edema.  Peripheral pulses are 2+. R radial pulse 4+  Skin: Warm and dry.     Labs:  Lab Results   Component Value Date    WBC 13.0 2024    HGB 10.8 2024    HCT 35.4 2024    .0 2024     No results found for: \"PT\", \"INR\"  Lab Results   Component Value Date     2024    K 3.9 2024     2024    CO2 27.0 2024    BUN 20 2024    CREATSERUM 0.75 2024    GLU 99 2024    CA 8.4 2024        No results found for: \"TROP\", \"CKMB\"      Medications:     predniSONE  40 mg Oral Daily with breakfast    dilTIAZem  60 mg Oral 4 times per day    fluticasone furoate-vilanterol  1 puff Inhalation Daily    losartan  25 mg Oral Q24H    aspirin  325 mg Oral Daily    insulin aspart  1-7 Units Subcutaneous TID CC    ipratropium-albuterol  3 mL Nebulization QID         Ayo Graham MD  1/17/2024  11:14 AM

## 2024-01-17 NOTE — PLAN OF CARE
Patient was provided with discharge instructions, education, and follow up information. Patient's wife present for discharge instructions with patient's consent. Prescriptions were already sent electronically to patient's pharmacy. Patient verbalizes understanding of follow up information, specifically Cardiology, Pulmonology, and PCP. Patient has no questions after reviewing all instructions and will be going home.     Bridgett JACKSON, Discharge Leader [phone]

## 2024-01-17 NOTE — TELEPHONE ENCOUNTER
Incoming call from patient's wife requesting a  ED follow up  .  Patient saw discharge 1/17/2024.    Please advise .

## 2024-01-17 NOTE — PLAN OF CARE
S/P angiogram via right radial. No hematoma or bleeding noted. Complained of pain on the puncture site which was relieved with Tylenol prn. Given prn Guaifenesin. Afebrile overnight and saturating >97%.      Problem: Patient Centered Care  Goal: Patient preferences are identified and integrated in the patient's plan of care  Description: Interventions:  - What would you like us to know as we care for you?   - Provide timely, complete, and accurate information to patient/family  - Incorporate patient and family knowledge, values, beliefs, and cultural backgrounds into the planning and delivery of care  - Encourage patient/family to participate in care and decision-making at the level they choose  - Honor patient and family perspectives and choices  1/17/2024 0703 by Trista Guzman, RN  Outcome: Progressing     Problem: Patient/Family Goals  Goal: Patient/Family Long Term Goal  Description: Patient's Long Term Goal: To go home    Interventions:  - Pulmo consult; Douneb RTC, cardio to manage sinus tachycardia; Angiogram  - See additional Care Plan goals for specific interventions  1/17/2024 0705 by Trista Guzman, RN  Outcome: Progressing     Problem: Patient/Family Goals  Goal: Patient/Family Short Term Goal  Description: Patient's Short Term Goal:To resolve coughing spells    Interventions:   - Respi work-up; Douneb treatment, prn cough meds  - See additional Care Plan goals for specific interventions  1/17/2024 0705 by Trista Guzman, RN  Outcome: Progressing     Problem: RESPIRATORY - ADULT  Goal: Achieves optimal ventilation and oxygenation  Description: INTERVENTIONS:  - Assess for changes in respiratory status  - Assess for changes in mentation and behavior  - Position to facilitate oxygenation and minimize respiratory effort  - Oxygen supplementation based on oxygen saturation or ABGs  - Provide Smoking Cessation handout, if applicable  - Encourage broncho-pulmonary hygiene including cough, deep  breathe, Incentive Spirometry  - Assess the need for suctioning and perform as needed  - Assess and instruct to report SOB or any respiratory difficulty  - Respiratory Therapy support as indicated  - Manage/alleviate anxiety  - Monitor for signs/symptoms of CO2 retention  1/17/2024 0705 by Trista Guzman, RN  Outcome: Progressing     Problem: CARDIOVASCULAR - ADULT  Goal: Maintains optimal cardiac output and hemodynamic stability  Description: INTERVENTIONS:  - Monitor vital signs, rhythm, and trends  - Monitor for bleeding, hypotension and signs of decreased cardiac output  - Evaluate effectiveness of vasoactive medications to optimize hemodynamic stability  - Monitor arterial and/or venous puncture sites for bleeding and/or hematoma  - Assess quality of pulses, skin color and temperature  - Assess for signs of decreased coronary artery perfusion - ex. Angina  - Evaluate fluid balance, assess for edema, trend weights  1/17/2024 0705 by Trista Guzman, RN  Outcome: Progressing     Problem: CARDIOVASCULAR - ADULT  Goal: Absence of cardiac arrhythmias or at baseline  Description: INTERVENTIONS:  - Continuous cardiac monitoring, monitor vital signs, obtain 12 lead EKG if indicated  - Evaluate effectiveness of antiarrhythmic and heart rate control medications as ordered  - Initiate emergency measures for life threatening arrhythmias  - Monitor electrolytes and administer replacement therapy as ordered  1/17/2024 0705 by Trista Guzman, RN  Outcome: Progressing

## 2024-01-17 NOTE — PROGRESS NOTES
Report given to this RN from cath lab - No intervention - relayed to night RN. Patient prev in PMU - report given to night RN.

## 2024-01-17 NOTE — PLAN OF CARE
A&OX4, self care. On room air. Complains of mild pain in rt wrist where angio was done. Plan to discharge home with family   Problem: Patient Centered Care  Goal: Patient preferences are identified and integrated in the patient's plan of care  Description: Interventions:  - What would you like us to know as we care for you? Home with family   - Provide timely, complete, and accurate information to patient/family  - Incorporate patient and family knowledge, values, beliefs, and cultural backgrounds into the planning and delivery of care  - Encourage patient/family to participate in care and decision-making at the level they choose  - Honor patient and family perspectives and choices  Outcome: Progressing     Problem: Patient/Family Goals  Goal: Patient/Family Long Term Goal  Description: Patient's Long Term Goal: To go home    Interventions:  - Pulmo consult; Douneb RTC, cardio to manage sinus tachycardia; Angiogram  - See additional Care Plan goals for specific interventions  Outcome: Progressing  Goal: Patient/Family Short Term Goal  Description: Patient's Short Term Goal:To resolve coughing spells    Interventions:   - Respi work-up; Douneb treatment, prn cough meds  - See additional Care Plan goals for specific interventions  Outcome: Progressing     Problem: RESPIRATORY - ADULT  Goal: Achieves optimal ventilation and oxygenation  Description: INTERVENTIONS:  - Assess for changes in respiratory status  - Assess for changes in mentation and behavior  - Position to facilitate oxygenation and minimize respiratory effort  - Oxygen supplementation based on oxygen saturation or ABGs  - Provide Smoking Cessation handout, if applicable  - Encourage broncho-pulmonary hygiene including cough, deep breathe, Incentive Spirometry  - Assess the need for suctioning and perform as needed  - Assess and instruct to report SOB or any respiratory difficulty  - Respiratory Therapy support as indicated  - Manage/alleviate anxiety  -  Monitor for signs/symptoms of CO2 retention  Outcome: Progressing     Problem: CARDIOVASCULAR - ADULT  Goal: Maintains optimal cardiac output and hemodynamic stability  Description: INTERVENTIONS:  - Monitor vital signs, rhythm, and trends  - Monitor for bleeding, hypotension and signs of decreased cardiac output  - Evaluate effectiveness of vasoactive medications to optimize hemodynamic stability  - Monitor arterial and/or venous puncture sites for bleeding and/or hematoma  - Assess quality of pulses, skin color and temperature  - Assess for signs of decreased coronary artery perfusion - ex. Angina  - Evaluate fluid balance, assess for edema, trend weights  Outcome: Progressing  Goal: Absence of cardiac arrhythmias or at baseline  Description: INTERVENTIONS:  - Continuous cardiac monitoring, monitor vital signs, obtain 12 lead EKG if indicated  - Evaluate effectiveness of antiarrhythmic and heart rate control medications as ordered  - Initiate emergency measures for life threatening arrhythmias  - Monitor electrolytes and administer replacement therapy as ordered  Outcome: Progressing

## 2024-01-17 NOTE — PROGRESS NOTES
[facility]    Progress Note    Yareli Lynch Patient Status:  Inpatient    3/23/1980 MRN [id number]   Location [facility]5W Attending Jarek Saleh MD   Hosp Day # 3 PCP Yo Winchester,      Subjective:   Seen and examined this AM. Feels slightly better. +Productive cough with white phlegm. +Mild dyspnea on exertion. No wheezing. No chest pain or palpitations. No fever or chills. On room air.    Objective:   Blood pressure 146/75, pulse 88, temperature 98.5 °F (36.9 °C), temperature source Oral, resp. rate 18, height 5' 3\" (1.6 m), weight 276 lb 6.4 oz (125.4 kg), last menstrual period 2023, SpO2 98%, not currently breastfeeding.  Physical Exam  Vitals and nursing note reviewed.   Constitutional:       General: She is awake. She is not in acute distress.     Appearance: Normal appearance. She is obese. She is not ill-appearing.   HENT:      Head: Normocephalic and atraumatic.   Cardiovascular:      Rate and Rhythm: Normal rate and regular rhythm.   Pulmonary:      Effort: No respiratory distress.      Breath sounds: No wheezing, rhonchi or rales.      Comments: Coughing throughout exam  Musculoskeletal:      Cervical back: Normal range of motion and neck supple.      Right lower leg: No edema.      Left lower leg: No edema.   Skin:     General: Skin is warm and dry.   Neurological:      General: No focal deficit present.      Mental Status: She is alert and oriented to person, place, and time.   Psychiatric:         Behavior: Behavior is cooperative.       Results:   Lab Results   Component Value Date    WBC 13.0 (H) 2024    HGB 10.8 (L) 2024    HCT 35.4 2024    .0 2024    CREATSERUM 0.75 2024    BUN 20 2024     2024    K 3.9 2024     2024    CO2 27.0 2024    GLU 99 2024    CA 8.4 (L) 2024    ALB 3.3 (L) 2023    ALKPHO 66 2023    BILT 0.2 2023    TP 7.3 2023    AST  13 (L) 09/07/2023    ALT 19 09/07/2023    PTT 38.2 (H) 01/16/2024    TSH 1.360 09/07/2023    DDIMER <0.27 01/14/2024    TROPHS 591 (HH) 01/15/2024    B12 377 09/07/2023       Assessment & Plan:   Acute bronchitis  ?Asthma  CT chest with peribronchial thickening, no infiltrate  PFTs normal  COVID-19/influenza/RSV negative  Plan:  -Prednisone taper  -Nebs  -Breo  -Hold on antibiotics  -Okay to discharge from pulmonary perspective  -F/u Dr. Barrera/me in pulmonary clinic in 3-4 weeks    Chest pain  Serial troponin positive  CTA chest no PE  H/o abnormal nuclear stress test with small fixed anterior defect  Echo EF 65-70%  S/p [facility] 1/16 with normal coronaries  Plan:  -Off heparin gtt    YAMILE  Severe  Uses CPAP at home  Plan:  -CPAP nightly    Hypertension emergency  Better  Plan:  -Work-up for pheochromocytoma    Bruno Rader PA-C  Pulmonary Medicine  1/17/2024

## 2024-01-17 NOTE — DISCHARGE INSTRUCTIONS
Transradial Angiogram Discharge Instructions    The following instructions are for patient who have had an angiogram, cardiac cath, angioplasty, or stent through the radial artery.    Proper skin puncture site care is important during the healing period. This guideline should help to remind you of the verbal instructions you received from your physician or nurse.    Site: right    Site Care:  For 5 days after the procedure, make sure the wrist is not submerged in water or any liquid.  Leave bandage in place for 24 hours. Then, gently wash with soap and water. Do no put any other bandage, ointment, powders, or creams to the site.  For local swelling: apply ice  If bleeding occurs, elevate the hand above the heart and apply local pressure    Activity/Driving:  Avoid wrist flexion, extension, and fine motor activities (i.e. Texting, typing, using a computer mouse, etc.) for 24 hours.  Do not drive for 24 hours.  Do not lift or pull anything heavier than 10 pounnds with affected hand for 1 week.    Additional Instructions:  Do not take glucophage/metformin containing products for at least 48 hours after procedure, unless otherwise directed by your physician.    When to contact your physician  Call Dr. Boswell at _[phone]__ right away if you experience any of the following:    Swelling, pain, or bleeding at the site that is not relieved by applying ice or pressure  Signs of infection: redness, warmth, drainage at the site, chills, or temperature of 100.5 degrees or greater.  Changes in sensation, numbness, or tingling of affected hand  CHECK YOUR BLOOD PRESSURE DAILY AND WHEN NOT FEELING WELL,   WRITE IT DOWN TO CREATE A LOG AND BRING TO YOUR PCP FOR REVIEW    IF ANY QUESTIONS OR CONCERNS, CALL YOUR PCP FOR ADVICE

## 2024-01-17 NOTE — PROGRESS NOTES
Procedure hand off report given to RENETTA Griggs. Procedural access site is dry and intact with no signs and symptoms of bleeding and hematoma. Dr Boswell came to see pt /family at bedside post procedure. Pt is generally stable. Patient has 12 ml in the band. Accompanied by Luda JACKSON to the floor.

## 2024-01-17 NOTE — PROCEDURES
[facility]    Cardiac Cath Procedure Note  Yareli Lynch Patient Status:  Inpatient    3/23/1980 MRN [id number]   Location [facility] INTERVENTIONAL SUITES Attending Jarek Saleh MD   Hosp Day # 2 PCP Yo Winchester DO       Cardiologist: Bradley Boswell MD  Primary Proceduralist: Bradley Boswell MD  Procedure Performed: LHC and LV  Date of Procedure: 2024   Indication: Positive stress test    Summary of procedure:  Normal coronary anatomy      Assessment:  Non-cardiac troponin elevation  HTN  Obesity      Recommendations:  Continue aggressive risk factor modification  OK for bariatric surgery  Stop heparin gtt      Left Ventriculography and hemodynamics:   LV EF not done  LV EDP 18 mmHg  No gradient across aortic valve        Coronary Angiography  RCA:  Dominant and free of obstructive disease, supplies PDA and PL    Left main:  Free of obstructive disease    Left anterior descending:  Free of obstructive disease, supplies multiple diagonals which are non-obstructive    Circumflex:  Free of obstructive disease, supplies multiple OM branches which are patent        Summary of Case: After written informed consent was obtained from the patient, patient was brought to the cardiac catheterization laboratory.  Patient was prepped and draped in the usual sterile fashion. Lidocaine 1% was used to infiltrate the right radial artery for local anesthesia and a 6 Portuguese introducer sheath was inserted into the right radial artery.      Selective coronary angiography performed with JR4 catheter for RCA and JL3.5 catheter for LCA.  Angiography performed in standard projections.      6 Tuvaluan JR4 catheter placed in LV for hemodynamics.    Specimen sent to: No specimen collected  Estimated blood loss: 10 cc  Closure:  TR band      IV was maintained by RN and moderate conscious sedation of versed and fentanyl was given.  Patient was assessed and monitoring of oxygen, heart rate and blood pressure by nurse  and myself during the exam 35 minutes.      Bradley Boswell MD  01/16/24

## 2024-01-18 ENCOUNTER — TELEPHONE (OUTPATIENT)
Facility: CLINIC | Age: 44
End: 2024-01-18

## 2024-01-18 ENCOUNTER — PATIENT OUTREACH (OUTPATIENT)
Dept: CASE MANAGEMENT | Age: 44
End: 2024-01-18

## 2024-01-18 DIAGNOSIS — J98.01 ACUTE BRONCHOSPASM: ICD-10-CM

## 2024-01-18 DIAGNOSIS — I21.4 NSTEMI (NON-ST ELEVATED MYOCARDIAL INFARCTION) (HCC): ICD-10-CM

## 2024-01-18 DIAGNOSIS — J20.9 ACUTE BRONCHITIS, UNSPECIFIED ORGANISM: ICD-10-CM

## 2024-01-18 DIAGNOSIS — J45.41 MODERATE PERSISTENT REACTIVE AIRWAY DISEASE WITH ACUTE EXACERBATION: ICD-10-CM

## 2024-01-18 DIAGNOSIS — Z02.9 ENCOUNTERS FOR UNSPECIFIED ADMINISTRATIVE PURPOSE: Primary | ICD-10-CM

## 2024-01-18 LAB — ALDOSTERONE: 5.2 NG/DL

## 2024-01-18 RX ORDER — HYDROCHLOROTHIAZIDE 25 MG/1
25 TABLET ORAL DAILY
Qty: 90 TABLET | Refills: 0 | Status: SHIPPED | OUTPATIENT
Start: 2024-01-18

## 2024-01-18 NOTE — TELEPHONE ENCOUNTER
Please review; protocol failed/ has no protocol    Please see message below for upcoming appointment.    Future Appointments   Date Time Provider Department Center   1/19/2024  4:30 PM Danae Ledesma MD [facility] FP [facility] OP                                   Requested Prescriptions   Pending Prescriptions Disp Refills    HYDROCHLOROTHIAZIDE 25 MG Oral Tab [Pharmacy Med Name: HYDROCHLOROTHIAZIDE 25 MG TAB] 30 tablet 2     Sig: Take 1 tablet (25 mg total) by mouth daily.       Hypertensive Medications Protocol Failed - 1/17/2024  3:08 PM        Failed - Last BP reading less than 140/90     BP Readings from Last 1 Encounters:   01/17/24 158/83               Passed - In person appointment in the past 12 or next 3 months     Recent Outpatient Visits              2 weeks ago Subacute cough    [facility] Danae Ledesma MD    Office Visit    1 month ago HTN (hypertension), benign [I10]    [facility]    [facility]    2 months ago Class 3 severe obesity with body mass index (BMI) of 50.0 to 59.9 in adult, unspecified obesity type, unspecified whether serious comorbidity present (HCC) [E66.01, Z68.43]    [facility] Alda Davis RD    Office Visit    3 months ago Class 3 severe obesity with body mass index (BMI) of 50.0 to 59.9 in adult, unspecified obesity type, unspecified whether serious comorbidity present (HCC) [E66.01, Z68.43]    [address]    Office Visit    4 months ago HTN (hypertension), benign    [facility]    Virtual Phone E/M          Future Appointments         Provider Department Appt Notes    Tomorrow Danae Ledesma MD [facility] approved by Dr DENNIS,[facility] /hospital follow up    In 1 week Olive Ny  MD [facility] ([place], [place] Irritable bowel syndrome, unspecified type, policy informed    In 2 weeks Alda Parra RD [facility] [place])     In 2 weeks Yo Winchester DO [facility] Annual    In 3 months Neftali Jackson MD [facility] ([place], [place]) Follow up               Passed - CMP or BMP in past 6 months     Recent Results (from the past 4392 hour(s))   Basic Metabolic Panel (8)    Collection Time: 01/17/24  5:03 AM   Result Value Ref Range    Glucose 99 70 - 99 mg/dL    Sodium 138 136 - 145 mmol/L    Potassium 3.9 3.5 - 5.1 mmol/L    Chloride 106 98 - 112 mmol/L    CO2 27.0 21.0 - 32.0 mmol/L    Anion Gap 5 0 - 18 mmol/L    BUN 20 9 - 23 mg/dL    Creatinine 0.75 0.55 - 1.02 mg/dL    BUN/CREA Ratio 26.7 (H) 10.0 - 20.0    Calcium, Total 8.4 (L) 8.7 - 10.4 mg/dL    Calculated Osmolality 289 275 - 295 mOsm/kg    eGFR-Cr 101 >=60 mL/min/1.73m2     *Note: Due to a large number of results and/or encounters for the requested time period, some results have not been displayed. A complete set of results can be found in Results Review.               Passed - In person appointment or virtual visit in the past 6 months     Recent Outpatient Visits              2 weeks ago Subacute cough    [facility] Danae Ledesma MD    Office Visit    1 month ago HTN (hypertension), benign [I10]    [facility] ([place], [place]) Neftali Jackson MD    Office Visit    2 months ago Class 3 severe obesity with body mass index (BMI) of 50.0 to 59.9 in adult, unspecified obesity type, unspecified whether serious comorbidity present (HCC) [E66.01, Z68.43]    [facility] ([place], [place]) Alda Parra RD    Office Visit    3 months ago Class 3 severe obesity with body mass index (BMI) of 50.0 to 59.9 in adult, unspecified  obesity type, unspecified whether serious comorbidity present (HCC) [E66.01, Z68.43]    [address], [address]    Office Visit    4 months ago HTN (hypertension), benign    [facility]    Virtual Phone E/M          Future Appointments         Provider Department Appt Notes    Tomorrow Danae Ledesma MD [facility] approved by Dr DENNIS,[facility] /hospital follow up    In 1 week [facility] MD [facility] Irritable bowel syndrome, unspecified type, policy informed    In 2 weeks Alda Parra RD [facility]     In 2 weeks Yo Winchester DO [facility] Annual    In 3 months [facility]ftali Jackson MD [facility] Follow [facility]               Passed - EGFRCR or GFRNAA > 50     GFR Evaluation  EGFRCR: 101 , resulted on 1/17/2024             Recent Outpatient Visits              2 weeks ago Subacute cough    [facility] Danae Ledesma MD    Office Visit    1 month ago HTN (hypertension), benign [I10]    [facility]    Office Visit    2 months ago Class 3 severe obesity with body mass index (BMI) of 50.0 to 59.9 in adult, unspecified obesity type, unspecified whether serious comorbidity present (HCC) [E66.01, Z68.43]    [facility]    Office Visit    3 months ago Class 3 severe obesity with body mass index (BMI) of 50.0 to 59.9 in adult, unspecified obesity type, unspecified whether serious comorbidity present (HCC) [E66.01, Z68.43]    [address], [address]    Office Visit    4 months ago HTN (hypertension), benign     [facility] Neftali Jackson MD    Virtual Phone E/M          Future Appointments         Provider Department Appt Notes    Tomorrow Danae Ledesma MD [facility] approved by Dr DENNIS,[facility] /hospital follow up    In 1 week Olive Ny MD [facility] Irritable bowel syndrome, unspecified type, policy informed    In 2 weeks Alda Parra RD [facility]     In 2 weeks Yo Winchester DO [facility] Annual    In 3 months Neftali Jackson MD [facility] Follow up

## 2024-01-18 NOTE — TELEPHONE ENCOUNTER
Condition update:  While speaking with patient for TCM call:  Patient states she is not doing great, states she has diarrhea, states her cough is much worse, productive with yellow phlegm and pain in ribs and stomach from coughing so hard. Patient states she was given Robitussin-AC in the hospital and that worked well. NCM asked patient if she tried the benzonatate, patient states she did not receive a prescription for the benzonatate. NCM placed a call to the [facility] in [employer] on [address] [place] [address] in [place], pharmacist stated they did not receive a prescription for benzonatate. Patient is going to follow up tomorrow 1/19/2024 with her Pulmonologist Dr Bentley and her PCP she will what she can take for the cough. NCM suggested that she drink plenty of water, sleep with her head elevated on pillows so she is not lying flat. Patient states she is actually sleeping on her sofa, sitting up. Patient denies fever/chills, no chest pain or pain radiating from chest to neck, jaw, shoulders, arms or upper back. Patient is reporting abdominal cramping comes and goes no vomiting, however, she states she is having diarrhea.

## 2024-01-18 NOTE — TELEPHONE ENCOUNTER
Patient called back and would like to follow up the appointment request .   Appointment scheduled for tomorrow 1/19/24===see Dr Ledesma's note below.     Future Appointments   Date Time Provider Department Center   1/19/2024  4:30 PM Danae Ledesma MD [facility] FP [facility] OP

## 2024-01-18 NOTE — DISCHARGE SUMMARY
[facility]     Yareli Lynch Patient Status:  Inpatient    3/23/1980 MRN [id number]   Location [facility] 3W/SW Attending No att. providers found   Hosp Day # 3 PCP Yo Winchester DO     Date of Admission: 2024  Date of Discharge: 2024  Discharge Disposition: Home or Self Care    Admitting Chief Complaint:   Moderate persistent reactive airway disease with acute exacerbation [J45.41]    PCP: Yo Winchester DO    Discharge Diagnosis:   Acute bronchitis.     Acute bronchospasm.       Possible NSTEMI, ruled out     Hypertensive emergency.     Sinus tachycardia.       Hypokalemia.       Other problems  Morbid obesity BMI 48  Hypertension, see above  Steroid-induced leukocytosis          History of Present Illness:   Per Dr. Saleh  This is a 43 year oldfemale with complaints of worsening shortness of breath.  Patient was seen in the ER  for similar complaints.  Discharged with inhalers, steroids.  She started to feel better but then last night felt much worse again.  She is quite short of breath and presented for evaluation.  She does smoke but recent PFTs were relatively normal.  No fevers.  Several sick contacts in the last couple of weeks.  No chest pain.  Symptoms exacerbated by activity, alleviated by rest.           Brief Synopsis:   Acute bronchitis.  COVID, RSV, flu negative.  She has had sick contacts.  No fever.  Not septic.  No clear pneumonia on imaging.  -Pulmonology following  -Defer decision with antibiotics to them     Acute bronchospasm.  May have reactive airway disease despite normal PFTs a few months ago.  Failed outpatient oral steroids. Much better now, pt does not want to be on high dose steroids  -cont pred 40-->taper  -Nicole lobo'd, cont atrovent  -Pulmonology following  -Supplemental oxygen if needed     Possible NSTEMI. Type unknown. Vs related to extreme HTN and tachycardia. Previous abnormal stress test in .  CTA chest no  PE  -Cardiology consult appreciated  -Tele  -Echo EF 65-70%  -s/p Cardiac cath on 1/16 with normal coronaries  -s/p Heparin IV, stopped     Hypertensive emergency.  Blood pressures as high as 220 systolic with tachycardia, respiratory distress. Now much better since resp improvement  -IV hydralazine as needed  -IV Cardizem as needed  -Avoid IV beta-blockers if possible  -BP in better range now     Sinus tachycardia.  Reactive to steroids, nebulizers, respiratory distress, acute illness.  -IV Cardizem as above  -Treat underlying disorders     Hypokalemia.  May be due to steroid effect and or poor oral intake.  -Potassium supplements  -Repeat in the morning     Other problems  Morbid obesity BMI 48, pt f/u Dr. Jackson outpatient  Hypertension, see above  Steroid-induced leukocytosis  YAMILE, severe, uses CPAP at home     ----------------------------------  dvt prophylaxis: sc heparin  code status: full code    dispo: home, f/u pulmonary clinic, Dr. Barrera in 3-4 weeks           Discharge Medication List:     Discharge Medications        START taking these medications        Instructions Prescription details   benzonatate 100 MG Caps  Commonly known as: Tessalon      Take 1 capsule (100 mg total) by mouth 3 (three) times daily as needed for cough.   Refills: 0     Budesonide-Formoterol Fumarate 160-4.5 MCG/ACT Aero  Commonly known as: Symbicort  Start taking on: January 18, 2024      Inhale 2 puffs into the lungs 2 (two) times daily. Rinse mouth, gargle, and spit to follow.   Quantity: 1 each  Refills: 0     dilTIAZem  MG Cp24  Commonly known as: CardIZEM CD  Start taking on: January 18, 2024      Take 1 capsule (240 mg total) by mouth daily.   Quantity: 30 capsule  Refills: 0     losartan 25 MG Tabs  Commonly known as: Cozaar      Take 1 tablet (25 mg total) by mouth daily.   Quantity: 30 tablet  Refills: 0            CHANGE how you take these medications        Instructions Prescription details   predniSONE 10 MG  Tabs  Commonly known as: Deltasone  Start taking on: January 18, 2024  What changed:   medication strength  See the new instructions.      Take 3 tablets (30 mg total) by mouth daily with breakfast for 2 days, THEN 2 tablets (20 mg total) daily with breakfast for 2 days, THEN 1 tablet (10 mg total) daily with breakfast for 2 days.   Stop taking on: January 24, 2024  Quantity: 12 tablet  Refills: 0            CONTINUE taking these medications        Instructions Prescription details   albuterol 108 (90 Base) MCG/ACT Aers  Commonly known as: Ventolin HFA      Inhale 1-2 puffs into the lungs every 4 to 6 hours as needed for Wheezing.   Quantity: 18 g  Refills: 0     albuterol (2.5 MG/3ML) 0.083% Nebu  Commonly known as: Ventolin      Take 3 mL (2.5 mg total) by nebulization every 4 (four) hours as needed for Wheezing or Shortness of Breath.   Quantity: 120 mL  Refills: 0     ergocalciferol 1.25 MG (60464 UT) Caps  Commonly known as: Vitamin D2      Take 1 capsule (50,000 Units total) by mouth once a week.   Refills: 0     guaiFENesin-codeine 100-10 MG/5ML Soln  Commonly known as: Robitussin AC      Take 10 mL by mouth 3 (three) times daily as needed for cough.   Quantity: 120 mL  Refills: 0     hydroCHLOROthiazide 25 MG Tabs  Commonly known as: Hydrodiuril      Take 1 tablet (25 mg total) by mouth daily.   Quantity: 90 tablet  Refills: 0     Ozempic (0.25 or 0.5 MG/DOSE) 2 MG/3ML Sopn  Generic drug: semaglutide      Inject 0.5 mg into the skin once a week.   Quantity: 3 mL  Refills: 3            STOP taking these medications      Arnuity Ellipta 100 MCG/ACT Aepb  Generic drug: fluticasone furoate                  Where to Get Your Medications        These medications were sent to [facility] [address] [place], [place] - [address] [phone], [phone]  [address] [address]      Phone: [phone]   albuterol (2.5 MG/3ML) 0.083% Nebu  Budesonide-Formoterol Fumarate 160-4.5 MCG/ACT Aero  dilTIAZem ER  240 MG Cp24  losartan 25 MG Tabs  predniSONE 10 MG Tabs         Follow-up appointment:   Freddie Barrera DO  [address] [facility]  [address]  [facility] [id number] [phone]    Schedule an appointment as soon as possible for a visit in 4 week(s)  or with Bradley Nguyen PA-C, MD  [address] [facility]  [address]  [facility] [address]  [phone]    Follow up in 1 week(s)  For wound re-check    Danae Ledesma MD  [address] [id number] [phone]    Follow up in 7 day(s)  Your provider Dr Danae Ledesma stated that they will give you a call to schedule your follow-up appointment once you are home.  If you do not hear from them within a day or two, please call them to schedule your appointment to be within 7 days.      Vital signs:  Temp:  [98 °F (36.7 °C)-98.5 °F (36.9 °C)] 98.5 °F (36.9 °C)  Pulse:  [] 114  Resp:  [18-24] 18  BP: (117-158)/(64-88) 158/83  SpO2:  [94 %-98 %] 98 %    Physical Exam:    General: No acute distress. Seen with wife  Respiratory: Clear to auscultation bilaterally. No wheezes. No rhonchi.  Cardiovascular: S1, S2. Regular rate and rhythm. No murmurs, rubs or gallops.   Abdomen: Soft, nontender, nondistended.  Positive bowel sounds. No rebound or guarding.  Neurologic: No focal neurological deficits.   Musculoskeletal: Moves all extremities.  Extremities: No edema.  -----------------------------------------------------------------------------------------------  PATIENT DISCHARGE INSTRUCTIONS: See electronic chart    I d/w pt and family the available results, management plan, medications changes, and discharge instructions including follow ups as outlined above.   Scripts sent to pharmacy.      Hospital Discharge Diagnoses:  asthma    Lace+ Score: 60  59-90 High Risk  29-58 Medium Risk  0-28   Low Risk.    TCM Follow-Up Recommendation:  LACE > 58: High Risk of readmission after discharge from the hospital.        TANNER DEL RIO MD 1/17/2024    Time spent:  > 30  minutes

## 2024-01-18 NOTE — TELEPHONE ENCOUNTER
Okay to use SDA. I also believe this would be a TCM/hospital follow up since patient was admitted.     Danae Ledesma MD, 01/18/24, 8:36 AM

## 2024-01-18 NOTE — PROGRESS NOTES
Initial Post Discharge Follow Up   Discharge Date: 1/17/24  Contact Date: 1/18/2024    Consent Verification:  Assessment Completed With: Patient  HIPAA Verified?  Yes    Discharge Dx:   Acute bronchitis.     Acute bronchospasm.    Possible NSTEMI, ruled out     Hypertensive emergency.     Sinus tachycardia.     Hypokalemia.    General:   How have you been since your discharge from the hospital? Patient states she is not doing great, states she has diarrhea, states her cough is much worse, yellow phlegm and pain in ribs and stomach from coughing so hard. Patient states she was given Robitussin-AC in the hospital and that worked well. NCM asked patient if she tried the benzonatate, patient states she did not receive a prescription for the benzonatate. NCM placed a call to the [facility] in [employer] on [address] in [place], pharmacist stated they did not receive a prescription for benzonatate. Patient is going to follow up tomorrow 1/19/2024 with her Pulmonologist Dr Bentley and her PCP she will what she can take for the cough. NCM suggested that she drink plenty of water, sleep with her head elevated on pillows so she is not lying flat. Patient states she is actually sleeping on her sofa, sitting up. Patient denies fever/chills, no chest pain or pain radiating from chest to neck, jaw, shoulders, arms or upper back. Patient is reporting abdominal cramping comes and goes no vomiting, however, she states she is having diarrhea. Patient denies any redness, swelling, drainage or separation of incision edges to right radial artery site. NCM informed patient that it was recommended that she check her blood pressure daily and if she is not feeling well and keep a log to bring with to her follow up appointments. Patient states she does not have a blood pressure machine at home. NCM instructed patient to change positions frequently, walk as tolerated, rest when needed, stay hydrated and continue to take up to ten deep breaths an hour while  awake, or if watching television take a deep breath with every commercial. NCM reviewed discharge instructions, medications, S&S of infection/blood clots with patient, she verbalized understanding of these. Patient denies any further questions or needs at this time.  Do you have any pain since discharge?  No    Where: Ribs and abdomen from coughing   Rating on pain scale 1-10, 10 being the worst pain you have ever experienced, what is current pain: 7/10  Alleviating factors: no  Aggravating factors: coughing  Is the pain manageable at home? No    When you were leaving the hospital were your discharge instructions reviewed with you? Yes  How well were your discharge instructions explained to you?   On a scale of 1-5   1- Very Poor and 5- Very well   Very Well  Do you have any questions about your discharge instructions?  No  Before leaving the hospital was your diagnoses explained to you? Yes  Do you have any questions about your diagnoses? No  Are you able to perform normal daily activities of living as you have prior to your hospital stay (dressing, bathing, ambulating to the bathroom, etc)? yes  (NCM) Was patient given a different diet per AVS? no      Medications:   STOP taking:  Arnuity Ellipta 100 MCG/ACT Aepb (fluticasone furoate)  Review your updated medication list below.  Current Outpatient Medications   Medication Sig Dispense Refill    hydroCHLOROthiazide 25 MG Oral Tab Take 1 tablet (25 mg total) by mouth daily. 90 tablet 0    albuterol (2.5 MG/3ML) 0.083% Inhalation Nebu Soln Take 3 mL (2.5 mg total) by nebulization every 4 (four) hours as needed for Wheezing or Shortness of Breath. 120 mL 0    predniSONE 10 MG Oral Tab Take 3 tablets (30 mg total) by mouth daily with breakfast for 2 days, THEN 2 tablets (20 mg total) daily with breakfast for 2 days, THEN 1 tablet (10 mg total) daily with breakfast for 2 days. 12 tablet 0    ergocalciferol 1.25 MG (04323 UT) Oral Cap Take 1 capsule (50,000 Units total)  by mouth once a week.      benzonatate 100 MG Oral Cap Take 1 capsule (100 mg total) by mouth 3 (three) times daily as needed for cough.      losartan 25 MG Oral Tab Take 1 tablet (25 mg total) by mouth daily. 30 tablet 0    Budesonide-Formoterol Fumarate (SYMBICORT) 160-4.5 MCG/ACT Inhalation Aerosol Inhale 2 puffs into the lungs 2 (two) times daily. Rinse mouth, gargle, and spit to follow. 1 each 0    dilTIAZem  MG Oral Capsule SR 24 Hr Take 1 capsule (240 mg total) by mouth daily. 30 capsule 0    guaiFENesin-codeine 100-10 MG/5ML Oral Solution Take 10 mL by mouth 3 (three) times daily as needed for cough. 120 mL 0    albuterol 108 (90 Base) MCG/ACT Inhalation Aero Soln Inhale 1-2 puffs into the lungs every 4 to 6 hours as needed for Wheezing. 18 g 0    semaglutide (OZEMPIC, 0.25 OR 0.5 MG/DOSE,) 2 MG/3ML Subcutaneous Solution Pen-injector Inject 0.5 mg into the skin once a week. 3 mL 3     Were there any changes to your current medication(s) noted on the AVS? Yes  If so, were these medication changes discussed with you prior to leaving the hospital? Yes  If a new medication was prescribed:    Was the new medication's purpose & side effects reviewed? Yes  Do you have any questions about your new medication? No  Did you  your discharge medications when you left the hospital? Yes, Except for the benzonatate patient states she did not have a printed script and pharmacy did not receive one via E-Prescribing.   Let's go over your medications together to make sure we are not missing anything. Medications Reviewed  Are there any reasons that keep you from taking your medication as prescribed? No  Are you having any concerns with constipation? No  Did patient receive their flu shot (Sept-March)? No    Discharge medications reviewed/discussed/and reconciled against outpatient medications with patient.  Any changes or updates to medications sent to PCP.  Patient Acknowledged     Referrals/orders at  D/C:  Referrals/orders placed at D/C? no    DME ordered at D/C? No      Discharge orders, AVS reviewed and discussed with patient. Any changes or updates to orders sent to PCP.  Patient Acknowledged      SDOH:   Transportation:    Transportation Needs: No Transportation Needs (1/14/2024)    Transportation Needs     Lack of Transportation: No       Financial Strain:    Financial Resource Strain: Low Risk  (1/18/2024)    Financial Resource Strain     Difficulty of Paying Living Expenses: Not on file     Med Affordability: No       Diagnosis specifics:   Transradial Angiogram Discharge Instructions  The following instructions are for patient who have had an angiogram, cardiac cath, angioplasty, or stent through the radial artery.  Proper skin puncture site care is important during the healing period.  Site: right    Site Care:  For 5 days after the procedure, make sure the wrist is not submerged in water or any liquid.  Leave bandage in place for 24 hours. Then, gently wash with soap and water.   Do no put any other bandage, ointment, powders, or creams to the site.  For local swelling: apply ice  If bleeding occurs, elevate the hand above the heart and apply local pressure  Activity/Driving:  Avoid wrist flexion, extension, and fine motor activities (i.e. Texting, typing, using a computer mouse, etc.) for 24 hours.  Do not drive for 24 hours.  Do not lift or pull anything heavier than 10 pounds with affected hand for 1 week.  Additional Instructions:  Do not take glucophage/metformin containing products for at least 48 hours after procedure, unless otherwise directed by your physician.  When to contact your physician  Call Dr. Boswell at _[phone]__ right away if you experience any of the following:  Swelling, pain, or bleeding at the site that is not relieved by applying ice or pressure  Signs of infection: redness, warmth, drainage at the site, chills, or temperature of 100.5 degrees or greater.  Changes in  sensation, numbness, or tingling of affected hand    CHECK YOUR BLOOD PRESSURE DAILY AND WHEN NOT FEELING WELL,  WRITE IT DOWN TO CREATE A LOG AND BRING TO YOUR PCP FOR REVIEW  Acute Bronchitis  Call your provider right away if:  Symptoms worsen, or you have new symptoms  Breathing problems worsen  Symptoms don’t get better within a week, or within a few days of taking antibiotics    Call 911 if you:  Are wheezing  Feel lightheaded or dizzy  Can't talk  Have skin, lips, or nails that look blue, gray, or pale    Follow up appointments:      Your appointments       Date & Time Appointment Department ([place])    Jan 19, 2024  4:30 PM CST Hospital Follow Up with Danae Ledesma MD [facility] ([facility])        Jan 30, 2024  3:30 PM CST Consult with Olive Ny MD [facility] ([facility])        Feb 06, 2024  8:00 AM CST RD Follow Up PreOp with Alda Parra RD [facility] ([facility])        Feb 06, 2024 10:30 AM CST Adult Physical with Yo Winchester DO [facility] ([facility])    PLEASE NOTE - Most insurances allow a Complete Physical once every 366 days. Please schedule accordingly.    Please arrive 15 minutes prior to your scheduled appointment. Please also bring your Insurance card, Photo ID, and your medication bottles or a list of your current medication.    If you no longer require this appointment, please contact your physician office to cancel.        May 09, 2024  3:15 PM CDT Video Visit with Neftali Jackson MD [facility] ([facility])    Please verify your telehealth insurance benefits prior to your appointment.    You must be in the state of [place] during the virtual visit.      Please use the [employer] and launch the video visit 10 minutes prior to your scheduled appointment time to ensure your camera and microphone are working properly. Once the video visit has started you will be placed in a waiting room until the provider begins the visit.     You will receive an email confirmation with instructions.  If you have questions, call your doctor's office directly.    If you are having issues or need to use a desktop/laptop, please follow the below steps:        1.       Close out all other open apps (could be competing for audio resources)  2.       Disable Bluetooth  3.       Reboot mobile device before joining the video  4.       Come off Wi-Fi and switch over to Data    Please see our Video Visit Tip Sheet if you need additional assistance.     If you believe this is an emergency, please dial 911 immediately.                [facility]  [address]  [facility] [address]  [phone] [facility]  [address]  [facility] [address]  [phone] [facility]  [address] [address] [phone]            TCC  Was TCC ordered: No      PCP (If no TCC appointment)  Does patient already have a PCP appointment scheduled? Yes  [facility] Confirmed PCP office TCM appointment with patient on 1/19/2024 at 4:30 pm with Dr Ledesma.     Specialist    Does the patient have any other follow up appointment(s) needing to be scheduled? Yes  If yes: [facility] reviewed upcoming specialist appointment with patient: Yes  Does the patient need assistance scheduling appointment(s): No  Patient reports she is following up with her pulmonologist Dr. Bentley on 1/19/2024 at 2:30 pm.  Patient will call Dr Boswell, cardiology to schedule an appointment in 1 week for a wound  re-check.    Is there any reason as to why you cannot make your appointment(s)?  No     Needs post D/C:   Now that you are home, are there any needs or concerns you need addressed before your next visit with your PCP?  (DME, meds, questions, etc.): No    Interventions by NCM:   NCM reviewed medications, discharge instructions, S&S of infection/blood clots. Patient instructed to report any new or worsening symptoms, when to call the doctor and when to call 911. Patient verbalized understanding of these. NCM instructed patient to call PCP with any questions or needs, she states she will.      [facility] referral placed:    Not Applicable    BOOK BY DATE: 1/31/2024

## 2024-01-18 NOTE — PROGRESS NOTES
NCM placed call to patient for TCM, LM requesting a call back to [phone] with a condition update.    Discharge Dx:   Acute bronchitis.     Acute bronchospasm.    Possible NSTEMI, ruled out     Hypertensive emergency.     Sinus tachycardia.     Hypokalemia.

## 2024-01-19 ENCOUNTER — OFFICE VISIT (OUTPATIENT)
Facility: CLINIC | Age: 44
End: 2024-01-19
Payer: COMMERCIAL

## 2024-01-19 ENCOUNTER — TELEPHONE (OUTPATIENT)
Dept: PULMONOLOGY | Facility: CLINIC | Age: 44
End: 2024-01-19

## 2024-01-19 VITALS
SYSTOLIC BLOOD PRESSURE: 130 MMHG | BODY MASS INDEX: 51 KG/M2 | HEART RATE: 109 BPM | WEIGHT: 285.25 LBS | OXYGEN SATURATION: 98 % | DIASTOLIC BLOOD PRESSURE: 80 MMHG

## 2024-01-19 DIAGNOSIS — J45.41 MODERATE PERSISTENT ASTHMA WITH ACUTE EXACERBATION: Primary | ICD-10-CM

## 2024-01-19 DIAGNOSIS — E83.51 HYPOCALCEMIA: ICD-10-CM

## 2024-01-19 DIAGNOSIS — I25.2 HISTORY OF NON-ST ELEVATION MYOCARDIAL INFARCTION (NSTEMI): ICD-10-CM

## 2024-01-19 DIAGNOSIS — D72.9 NEUTROPHILIA: ICD-10-CM

## 2024-01-19 PROBLEM — R06.83 SNORING: Status: RESOLVED | Noted: 2022-12-21 | Resolved: 2024-01-19

## 2024-01-19 PROBLEM — Z51.81 ENCOUNTER FOR THERAPEUTIC DRUG MONITORING: Status: RESOLVED | Noted: 2023-08-09 | Resolved: 2024-01-19

## 2024-01-19 PROBLEM — O99.210 OBESITY IN PREGNANCY: Status: RESOLVED | Noted: 2019-01-10 | Resolved: 2024-01-19

## 2024-01-19 PROBLEM — R63.5 WEIGHT GAIN: Status: RESOLVED | Noted: 2022-12-21 | Resolved: 2024-01-19

## 2024-01-19 PROBLEM — O99.210 OBESITY IN PREGNANCY (HCC): Status: RESOLVED | Noted: 2019-01-10 | Resolved: 2024-01-19

## 2024-01-19 PROBLEM — R60.0 LOWER EXTREMITY EDEMA: Status: RESOLVED | Noted: 2022-12-21 | Resolved: 2024-01-19

## 2024-01-19 PROCEDURE — 3079F DIAST BP 80-89 MM HG: CPT | Performed by: FAMILY MEDICINE

## 2024-01-19 PROCEDURE — 99495 TRANSJ CARE MGMT MOD F2F 14D: CPT | Performed by: FAMILY MEDICINE

## 2024-01-19 PROCEDURE — 3075F SYST BP GE 130 - 139MM HG: CPT | Performed by: FAMILY MEDICINE

## 2024-01-19 RX ORDER — AZITHROMYCIN 250 MG/1
250 TABLET, FILM COATED ORAL DAILY
COMMUNITY

## 2024-01-19 RX ORDER — MONTELUKAST SODIUM 10 MG/1
10 TABLET ORAL NIGHTLY
COMMUNITY

## 2024-01-19 RX ORDER — FERROUS SULFATE 325(65) MG
1 TABLET ORAL DAILY
COMMUNITY
Start: 2023-12-22

## 2024-01-19 RX ORDER — CODEINE PHOSPHATE AND GUAIFENESIN 10; 100 MG/5ML; MG/5ML
10 SOLUTION ORAL 3 TIMES DAILY PRN
Qty: 120 ML | Refills: 0 | Status: SHIPPED | OUTPATIENT
Start: 2024-01-19

## 2024-01-19 RX ORDER — PREDNISONE 20 MG/1
20 TABLET ORAL DAILY
COMMUNITY

## 2024-01-19 RX ORDER — TOPIRAMATE 25 MG/1
25 TABLET ORAL DAILY
COMMUNITY
Start: 2024-01-17

## 2024-01-19 NOTE — TELEPHONE ENCOUNTER
Pt has a follow up appointment today with Dr. Ledesma.  Spoke with Pt. She will keep appointment, continues to have diarrhea.  No earlier openings today. Advised to keep hydrated and bland diet.  Future Appointments   Date Time Provider Department Center   1/19/2024  4:30 PM Danae Ledesma MD [facility] FP [facility] OP

## 2024-01-19 NOTE — PROGRESS NOTES
Subjective:   Yareli Lynch is a 43 year old female who presents for hospital follow up.   She was discharged from [facility] to Home or Self Care  Admission Date: 1/14/24   Discharge Date: 1/17/24  Hospital Discharge Diagnosis: Moderate persistent asthma    Interactive contact within 2 business days post discharge first initiated on Date: 1/18/2024    During the visit, the following was completed:  Obtained and reviewed discharge summary, continuity of care documents, Hospitalist notes, Cardiology notes, and Pulmonology notes  Reviewed Labs (CBC, CMP), CT radiology results, X-Ray radiology results, and Echocardiogram    HPI: Admitted on 1/14/2024 for what was initially thought to be reactive airway since failed out patient inhalers and oral steroids. Also was in hypertensive urgency with BPs 210s. Had potential NSTEMI (elevated trops, EKG normal) on 1/14/204. CT PE negative. Cardiology consulted and performed left heart cath which was normal. Cardiology discharged patient on diltiazem and losartan.     Since discharge patient states not feeling much better. Did have slight improvement in breathing in cough after discharge but then had worsening of cough yesterday. Remains About the same today. Patient states feel she is at 30%. Did see pulmonologist today Dr Bentley who informed patient that she does not have reactive airway. She does have asthma. Was started on Symbicort, Singulair, another prednisone taper, and azithromycin. Has follow up with Dr Bentley on February 2nd. Does have cardiology follow up scheduled as well.     Patient also needs both continuous and intermittent leave FMLA for time missed during hospitalization and subsequent recovery time. First day of work missed was 1/11/2024. Will keep patient off of work until 1/28 to allow for 1 week treatment prescribed by pulm. Should also have intermittent leave 2 days off per month as needed for appointments and/or asthma flare ups.       History/Other:   Current  Medications:  Medication Reconciliation:  I am aware of an inpatient discharge within the last 30 days.  The discharge medication list has been reconciled with the patient's current medication list and reviewed by me.  See medication list for additions of new medication, and changes to current doses of medications and discontinued medications.  Outpatient Medications Marked as Taking for the 1/19/24 encounter (Office Visit) with Danae Ledesma MD   Medication Sig    predniSONE 20 MG Oral Tab Take 1 tablet (20 mg total) by mouth daily.    Ferrous Sulfate 325 (65 Fe) MG Oral Tab Take 1 tablet (325 mg total) by mouth daily.    topiramate 25 MG Oral Tab Take 1 tablet (25 mg total) by mouth daily.    guaiFENesin-codeine 100-10 MG/5ML Oral Solution Take 10 mL by mouth 3 (three) times daily as needed for cough.    azithromycin 250 MG Oral Tab Take 1 tablet (250 mg total) by mouth daily.    montelukast 10 MG Oral Tab Take 1 tablet (10 mg total) by mouth nightly.    hydroCHLOROthiazide 25 MG Oral Tab Take 1 tablet (25 mg total) by mouth daily.    albuterol (2.5 MG/3ML) 0.083% Inhalation Nebu Soln Take 3 mL (2.5 mg total) by nebulization every 4 (four) hours as needed for Wheezing or Shortness of Breath.    losartan 25 MG Oral Tab Take 1 tablet (25 mg total) by mouth daily.    Budesonide-Formoterol Fumarate (SYMBICORT) 160-4.5 MCG/ACT Inhalation Aerosol Inhale 2 puffs into the lungs 2 (two) times daily. Rinse mouth, gargle, and spit to follow.    dilTIAZem  MG Oral Capsule SR 24 Hr Take 1 capsule (240 mg total) by mouth daily.    albuterol 108 (90 Base) MCG/ACT Inhalation Aero Soln Inhale 1-2 puffs into the lungs every 4 to 6 hours as needed for Wheezing.       Review of Systems:  Review of Systems   Constitutional: Negative.    HENT: Negative.     Eyes: Negative.    Respiratory:  Positive for cough.    Cardiovascular: Negative.    Gastrointestinal: Negative.    Genitourinary: Negative.    Musculoskeletal: Negative.     Skin: Negative.    Neurological: Negative.    Psychiatric/Behavioral: Negative.         Objective:   Patient's last menstrual period was 12/25/2023.  Estimated body mass index is 50.53 kg/m² as calculated from the following:    Height as of 1/14/24: 5' 3\" (1.6 m).    Weight as of this encounter: 285 lb 4 oz (129.4 kg).   /80 (BP Location: Left arm, Patient Position: Sitting, Cuff Size: adult)   Pulse 109   Wt 285 lb 4 oz (129.4 kg)   LMP 12/25/2023   SpO2 98%   BMI 50.53 kg/m²    Physical Exam  Constitutional:       General: She is not in acute distress.     Appearance: Normal appearance. She is not ill-appearing.   HENT:      Head: Normocephalic and atraumatic.   Cardiovascular:      Rate and Rhythm: Regular rhythm. Tachycardia present.      Heart sounds: Normal heart sounds. No murmur heard.     No friction rub. No gallop.   Pulmonary:      Effort: Pulmonary effort is normal. No respiratory distress.      Breath sounds: Normal breath sounds. No stridor. No wheezing, rhonchi or rales.   Chest:      Chest wall: No tenderness.   Musculoskeletal:         General: Normal range of motion.   Skin:     General: Skin is warm and dry.      Findings: No rash.   Neurological:      General: No focal deficit present.      Mental Status: She is alert. Mental status is at baseline.   Psychiatric:         Mood and Affect: Mood normal.         Behavior: Behavior normal.          Assessment & Plan:   1. Moderate persistent asthma with acute exacerbation (Primary)  -     guaiFENesin-Codeine; Take 10 mL by mouth 3 (three) times daily as needed for cough.  Dispense: 120 mL; Refill: 0  -     Pulmonary Referral - In Network    -patient following closely with pulm. Did refill cough syrup as written. [place] reviewed today 01/19/24 . No red flags.   -had patient sign YARED so can see Dr Bentley's notes  -do feel patient would benefit from both intermittent leave and continuous leave for asthma and recent hospitalization and recovery.  Staff sent forms to forms department    2. History of non-ST elevation myocardial infarction (NSTEMI)  -has upcoming follow up with cardiology    3. Hypocalcemia  -     Basic Metabolic Panel (8); Future; Expected date: 01/19/2024  -noted on discharge labs. Will have patient repeat BMP. Lab closed at time of visit. To return for labs    4. Neutrophilia  -Likely secondary to steroid use. Will hold off on repeat CBC until off of steroids      Return in about 3 weeks (around 2/9/2024) for pulmonology after visit follow up.

## 2024-01-20 LAB — RENIN ACTIVITY: 7.35 NG/ML/HR

## 2024-01-23 ENCOUNTER — PATIENT MESSAGE (OUTPATIENT)
Facility: CLINIC | Age: 44
End: 2024-01-23

## 2024-01-24 NOTE — TELEPHONE ENCOUNTER
[facility] clinical staff, please assist regarding FMLA.  Pt was seen in office with Dr. Ledesma 1/19/24    From: Yareli Lynch  To: Danae Ledesma  Sent: 1/23/2024  9:49 AM CST  Subject: FMLA paperwork     Hello,  Just following up. Has the paperwork been sent to my employer? Anything I should do?    Thanks,  Yareli

## 2024-01-25 ENCOUNTER — TELEPHONE (OUTPATIENT)
Dept: FAMILY MEDICINE CLINIC | Facility: CLINIC | Age: 44
End: 2024-01-25

## 2024-01-25 LAB
METANEPHRINE: <25 PG/ML
NORMETANEPHRINE: 143.4 PG/ML

## 2024-01-25 NOTE — TELEPHONE ENCOUNTER
Patient calling for status of [place] paperwork.   Patient would also like to know if she can have be released to return to work on 1/29/2024.  Patient states the paperwork she took for her OV on 1/19 had the letter needed to turn to her employer.   Message sent to provider for further review.

## 2024-01-25 NOTE — TELEPHONE ENCOUNTER
Dr. Ledesma,    Pt is requesting continuous FMLA due to recent hospitalization and intermittent FMLA due to recurrent asthma flare ups. Pt is requesting 2-3 flare ups per month each episode lasting 1 day and 1-2 appts per month each appt lasting 1-4 hours. Do you support?    Thank you,  Itzel ARCE

## 2024-01-26 ENCOUNTER — TELEPHONE (OUTPATIENT)
Facility: CLINIC | Age: 44
End: 2024-01-26

## 2024-01-26 NOTE — TELEPHONE ENCOUNTER
Dr. Ledesma,     *The ACKNOWLEDGE button has been moved to the top right ribbon*    Please sign off on 2 forms if you agree to: Continuous/Intermittent due to asthma  Return to work form, pt to RTW 1/29/24  (place your signature on the first page only)    -From your Inbasket, Highlight the patient and click Chart   -Double click the 1/25/24 Forms Completion telephone encounter  -Scroll down to the Media section   -Click the blue Hyperlink: FMLA Dr Ledesma 1/26/24,RTW Dr Ledesma 1/26/24  -Click Acknowledge located in the top right ribbon/menu   -Drag the mouse into the blank space of the document and a + sign will appear. Left click to   electronically sign the document.     Thank you,  Itzel ARCE

## 2024-01-26 NOTE — TELEPHONE ENCOUNTER
Patient paged provider needing letter for work. Is at Cardiology office right now who recommended patient work from home starting Monday. Recommended getting letter from Cardiology since is at the office at this moment. Also recommended patient call forms department to make any updates to MATTIE Ledesma MD, 01/26/24, 3:33 PM

## 2024-01-27 NOTE — TELEPHONE ENCOUNTER
Dr Machado (on behalf of Dr Ledesma )=see patient's request, pended letter =see under Communication tab .

## 2024-01-30 ENCOUNTER — TELEPHONE (OUTPATIENT)
Dept: GASTROENTEROLOGY | Facility: CLINIC | Age: 44
End: 2024-01-30

## 2024-01-30 ENCOUNTER — OFFICE VISIT (OUTPATIENT)
Dept: GASTROENTEROLOGY | Facility: CLINIC | Age: 44
End: 2024-01-30

## 2024-01-30 VITALS
HEART RATE: 89 BPM | HEIGHT: 63 IN | SYSTOLIC BLOOD PRESSURE: 147 MMHG | DIASTOLIC BLOOD PRESSURE: 81 MMHG | BODY MASS INDEX: 50.96 KG/M2 | WEIGHT: 287.63 LBS

## 2024-01-30 DIAGNOSIS — K58.9 IRRITABLE BOWEL SYNDROME, UNSPECIFIED TYPE: ICD-10-CM

## 2024-01-30 DIAGNOSIS — Z71.89 ENCOUNTER FOR PRE-BARIATRIC SURGERY COUNSELING AND EDUCATION: ICD-10-CM

## 2024-01-30 DIAGNOSIS — D50.9 IRON DEFICIENCY ANEMIA, UNSPECIFIED IRON DEFICIENCY ANEMIA TYPE: Primary | ICD-10-CM

## 2024-01-30 DIAGNOSIS — R10.10 UPPER ABDOMINAL PAIN: ICD-10-CM

## 2024-01-30 DIAGNOSIS — K76.0 FATTY LIVER: ICD-10-CM

## 2024-01-30 DIAGNOSIS — E66.01 MORBID OBESITY WITH BMI OF 50.0-59.9, ADULT (HCC): ICD-10-CM

## 2024-01-30 PROCEDURE — 3077F SYST BP >= 140 MM HG: CPT | Performed by: INTERNAL MEDICINE

## 2024-01-30 PROCEDURE — 3008F BODY MASS INDEX DOCD: CPT | Performed by: INTERNAL MEDICINE

## 2024-01-30 PROCEDURE — 99204 OFFICE O/P NEW MOD 45 MIN: CPT | Performed by: INTERNAL MEDICINE

## 2024-01-30 PROCEDURE — 3079F DIAST BP 80-89 MM HG: CPT | Performed by: INTERNAL MEDICINE

## 2024-01-30 RX ORDER — ERGOCALCIFEROL 1.25 MG/1
50000 CAPSULE ORAL WEEKLY
COMMUNITY
Start: 2024-01-26

## 2024-01-30 NOTE — TELEPHONE ENCOUNTER
Scheduled for:  Colonoscopy [id number] , Egd [address]  Provider Name:  Dr. Ny   Date:  6/10/24  Location:[facility]  Sedation:  MAC  Time: 11:00 Am  (Patient is aware arrival time is at 10:00 Am )  Prep:  Golytely ,Egd   Meds/Allergies Reconciled?:  Physician Reviewed   Diagnosis with codes:  SUSAN -D50.9 ,Pre- Bariatric Clearance - Z71.89  Was patient informed to call insurance with codes (Y/N):  Yes  Referral sent?:  Referral was sent at the time of electronic surgical scheduling.  [facility] or [facility] notified?:  I sent an electronic request to Endo Scheduling and received a confirmation today.  Medication Orders:  Pt is aware to NOT take iron pills, herbal meds and diet supplements for 7 days before exam. Also to NOT take any form of alcohol, recreational drugs and any forms of ED meds 24 hours before exam.    SEVEN DAYS BEFORE colonoscopy: HOLD ozempic      B. Continue ALL other medications as usual  Misc Orders:  Patient was informed about the new cancellation policy for his/her procedure. Patient was also given a copy of the cancellation policy at the time of the appointment and verbalized understanding.      Further instructions given by staff:  I provide prep instructions to patient at the time of the appointment and reviewed date, time and location, patient verbalized that she understood and is aware to call if she has any questions.

## 2024-01-30 NOTE — H&P
[facility] - Gastroenterology                                                                                                               Reason for consult: bariatric clearance    Requesting physician or provider: Yo Winchester DO    Chief Complaint   Patient presents with    Irritable Bowel     Bariatric clearance ,had US done and wants to discuss the results       HPI:   Yareli Lynch is a 43 year old woman with history of allergic rhinitis, anxiety, DVT, HTN, gestational diabetes, HTN, PCOS, IBS, and morbid obesity (BMI 50.95) presenting for evaluation prior to bariatric surgery.     She is planning to get a gastric sleeve. She reports history of heartburn and reflux that occurred during pregnancy, but has not had these symptoms after she delivered. She has about 4 BMs a day that are soft and easy to pass without straining. She notes history of IBS. She initially had a lot of constipation, then she had a lot of diarrhea. She also feels like her IBS symptoms have improved after delivery of her children.     She does note abdominal pain that starts in her upper abdomen and causes her to be \"frozen in pain\" This has happened 3 times in the past year. The pain is associated with cold sweats. Pain usually lasts about 1-2 hours and then weans.     She notes previous history of EGD and colonoscopy for IBS symptoms. She reports that these were normal. She was told she had anemia on labs in 2022 and was started on iron supplements in October 2023. On chart review, she has had mild anemia dating back to 2019. Her last endoscopic evaluation was in 2018. Iron studies in 09/2023 with iron 31, TIBC 535, 6% saturation and ferritin 7.2. She denies overt GI bleeding.     She had a recent abdominal US in 09/2023 with no cholecystitis, cholelithiasis or biliary ductal dilation. She was noted to have hepatomegaly and hepatic  steatosis. Recent LFTs normal.     Prior endoscopies:  No reports available     Soc:  -denies smoking  -denies EtOH    Wt Readings from Last 6 Encounters:   24 287 lb 9.6 oz (130.5 kg)   24 285 lb 4 oz (129.4 kg)   24 276 lb 6.4 oz (125.4 kg)   24 276 lb (125.2 kg)   24 286 lb (129.7 kg)   23 287 lb 8 oz (130.4 kg)        History, Medications, Allergies, ROS:      Past Medical History:   Diagnosis Date    Allergic rhinitis     Anxiety     DVT (deep vein thrombosis) in pregnancy 2021    Encounter for therapeutic drug monitoring 2023    Essential hypertension     Gestational diabetes     High myopia 1986    History of IBS     History of PCOS     Hypertension     Infertility, female     Morbid obesity with BMI of 45.0-49.9, adult (HCC)     Obesity     PTSD (post-traumatic stress disorder)       Past Surgical History:   Procedure Laterality Date      2019, 2021    COLONOSCOPY  2018    IVF PACKAGE  2018    OTHER SURGICAL HISTORY      egg retrieval for IVF      Family Hx:   Family History   Problem Relation Age of Onset    Thyroid Disorder Father     Diabetes Father     Diabetes Mother     Hypertension Mother     No Known Problems Maternal Grandmother     No Known Problems Maternal Grandfather     Glaucoma Neg     Retinal detachment Neg     Macular degeneration Neg       Social History:   Social History     Socioeconomic History    Marital status:    Occupational History    Occupation: health    Tobacco Use    Smoking status: Former    Smokeless tobacco: Never    Tobacco comments:     On and off   Vaping Use    Vaping Use: Never used   Substance and Sexual Activity    Alcohol use: No    Drug use: No    Sexual activity: Yes     Partners: Female     Comment: with wife    Other Topics Concern    Blood Transfusions No   Social History Narrative    No H/O abuse     Lives with wife Hallie     Social Determinants of Health      Financial Resource Strain: Low Risk  (1/18/2024)    Financial Resource Strain     Med Affordability: No   Food Insecurity: No Food Insecurity (1/14/2024)    Food Insecurity     Food Insecurity: Never true   Transportation Needs: No Transportation Needs (1/14/2024)    Transportation Needs     Lack of Transportation: No   Housing Stability: Low Risk  (1/14/2024)    Housing Stability     Housing Instability: No        Medications (Active prior to today's visit):  Current Outpatient Medications   Medication Sig Dispense Refill    polyethylene glycol, PEG 3350-KCl-NaBcb-NaCl-NaSulf, 236 g Oral Recon Soln Take 4,000 mL by mouth As Directed. Take 2,000 mL the night before your procedure and 2,000 mL the morning of your procedure. 1 each 0    predniSONE 20 MG Oral Tab Take 1 tablet (20 mg total) by mouth daily.      Ferrous Sulfate 325 (65 Fe) MG Oral Tab Take 1 tablet (325 mg total) by mouth daily.      topiramate 25 MG Oral Tab Take 1 tablet (25 mg total) by mouth daily.      azithromycin 250 MG Oral Tab Take 1 tablet (250 mg total) by mouth daily.      montelukast 10 MG Oral Tab Take 1 tablet (10 mg total) by mouth nightly.      hydroCHLOROthiazide 25 MG Oral Tab Take 1 tablet (25 mg total) by mouth daily. 90 tablet 0    albuterol (2.5 MG/3ML) 0.083% Inhalation Nebu Soln Take 3 mL (2.5 mg total) by nebulization every 4 (four) hours as needed for Wheezing or Shortness of Breath. 120 mL 0    losartan 25 MG Oral Tab Take 1 tablet (25 mg total) by mouth daily. 30 tablet 0    Budesonide-Formoterol Fumarate (SYMBICORT) 160-4.5 MCG/ACT Inhalation Aerosol Inhale 2 puffs into the lungs 2 (two) times daily. Rinse mouth, gargle, and spit to follow. 1 each 0    dilTIAZem  MG Oral Capsule SR 24 Hr Take 1 capsule (240 mg total) by mouth daily. 30 capsule 0    albuterol 108 (90 Base) MCG/ACT Inhalation Aero Soln Inhale 1-2 puffs into the lungs every 4 to 6 hours as needed for Wheezing. 18 g 0    ergocalciferol 1.25 MG (17786  UT) Oral Cap Take 1 capsule (50,000 Units total) by mouth once a week.      guaiFENesin-codeine 100-10 MG/5ML Oral Solution Take 10 mL by mouth 3 (three) times daily as needed for cough. (Patient not taking: Reported on 1/30/2024) 120 mL 0    semaglutide (OZEMPIC, 0.25 OR 0.5 MG/DOSE,) 2 MG/3ML Subcutaneous Solution Pen-injector Inject 0.5 mg into the skin once a week. 3 mL 3       Allergies:  Allergies   Allergen Reactions    Lisinopril Coughing and UNKNOWN       ROS:   CONSTITUTIONAL:  negative for fevers, rigors  EYES:  negative for diplopia   RESPIRATORY:  negative for severe shortness of breath  CARDIOVASCULAR:  negative for crushing sub-sternal chest pain  GASTROINTESTINAL:  see HPI  GENITOURINARY:  negative for dysuria or gross hematuria  INTEGUMENT/BREAST:  SKIN:  negative for jaundice   ALLERGIC/IMMUNOLOGIC:  negative for hay fever  ENDOCRINE:  negative for cold intolerance and heat intolerance  MUSCULOSKELETAL:  negative for joint effusion/severe erythema  BEHAVIOR/PSYCH:  negative for psychotic behavior    PHYSICAL EXAM:   Blood pressure 147/81, pulse 89, height 5' 3\" (1.6 m), weight 287 lb 9.6 oz (130.5 kg), last menstrual period 12/25/2023, not currently breastfeeding.    Gen: appears comfortable and in no acute distress  HEENT: sclera appear anicteric, mucus membranes appear moist  CV: regular rate, the extremities are warm and well-perfused   Lung: no increased work of breathing, no conversational dyspnea   Abd: soft, non-tender exam in all quadrants without rigidity or guarding, non-distended, no masses palpated  Skin: no jaundice, no apparent rashes   Neuro: alert and interactive, no focal neuro deficits  Psych: cooperative, normal affect     Labs/Imaging:     Patient's pertinent labs and imaging were reviewed and discussed with patient today.      ASSESSMENT/PLAN:   Yareli Lynch is a 43 year old woman with history of allergic rhinitis, anxiety, DVT, HTN, gestational diabetes, HTN, PCOS, IBS, and  morbid obesity (BMI 50.95) presenting for evaluation prior to bariatric surgery.     She has morbid obesity with BMI > 50. She is planning to undergo bariatric surgery with Dr. Burciaga. She denies significant heartburn or acid reflux. She reports that she had these symptoms during pregnancy. She previously had an EGD about 10 years ago that was normal.     She has history of IBS with predominantly constipation then predominantly diarrhea. Now, she moves her bowels daily, usually 4 times a day with soft, easy to pass stool. She notes that IBS improved after pregnancies.     She has SUSAN. Recent ferritin < 10. She was started on iron supplements in 10/2023. She has no overt GI bleeding. She has anemia on labs dating back to 2019. Her last endoscopic evaluation was in 2018 with a normal colonoscopy.     She notes rare abdominal pain to her upper abdomen that causes her to be \"frozen in pain\". This has occurred 3x in the past year and self-resolved each time after 1-2 hours. She had recent abd US without cholelithiasis and a normal biliary tree. Unclear etiology for her pain.     She was noted to have hepatomegaly and hepatic steatosis on her recent US without evidence of steatohepatitis on labs. We discussed dietary changes and exercise as management of fatty liver disease. She does not drink alcohol. The best option for her BMI would be to undergo bariatric surgery, which she is already planning for. She has additional RFs including PCOS, HTN, HLD.     We will plan to perform an upper endoscopy and colonoscopy to evaluate for sources of blood loss in setting of SUSAN.     Recommendations:  1. Schedule upper endoscopy (EGD and colonoscopy with MAC [Diagnosis: SUSAN, pre-bariatric surgery].    2.  bowel prep from pharmacy (split dose golytely). If your prescription is not available and/or is cost-prohibitive, please contact the office as soon as possible to ensure you receive a bowel prep before your procedure.     3.  Medication adjustments:       A. SEVEN DAYS BEFORE colonoscopy: HOLD ozempic      B. Continue ALL other medications as usual    4. Read all bowel prep instructions carefully.    5. AVOID seeds, nuts, popcorn, and raw fruits and vegetables (cooked is okay) for 2-3 days before procedure.    6. If you start any NEW medication after your visit today, please notify us. Certain medications will need to be held before the procedure or the procedure cannot be performed.     7. You will need a ride home from your procedure since you are receiving sedation. Please ensure you will have an available ride home or the procedure cannot be performed.         You have fat in the liver seen on ultrasound. Your risk factors for fatty liver disease are abdominal obesity, high blood pressure, high cholesterol.    A 4-10% body weight loss can reverse all aspects of fatty liver. The best diet for the liver is a low carbohydrate mediterranean diet. Work on reducing or eliminating carbohydrates (breads, pasta, rice, tortilla, potatoes, beans, corn, soda, sweet drinks or even excess fruit, desserts). Fill your plate with nonstarchy vegetables and lean protein (fish and chicken). Do not avoid healthy fats (from a plant or a fish).    Look into using low carbohydrate or no carbohydrate alternatives: cauliflower rice (needs to be seasoned) or cauliflower mash, zucchini noodles, low carb bread (healthy life bread has 11g net carbs per 2 slices, there are also no carb breads available (aldi \"zero net carb bread\", [employer]co \"keto friendly bread\"), low carb tortillas (mission carb balance), cauliflower pizza crust (careful as some are high carb from rice flour). Look online for low carb recipes.     Consider logging your diet on an alexey like [employer]. This will help us determine how much of your diet comes from carbohydrates, protein and fat. We generally aim to get no more than 30% of calories from carbs, 30-35% from protein and 40% from fat with  most of the fat being from healthy plant or fish-based sources. Or aim for no more than 50-75g net carbs (total minus fiber) daily.    - In myfitnesspal, go to menu >> nutrition >> macros >> week view to see your nutrient distribution.    Exercise:   - Try and do a combination of exercises including aerobic (moving, sweating, short of breath), core strengthening (yoga, pilates), and strength training.  - Your goal is to be sweating, mildly short of breath, and have an elevated heart rate for roughly 30-45 minutes. You should pace yourself to achieve this and of course you may need to start slowly and ramp up this goal.  - Goal exercise = 4-5 times a week for 30-60 minutes per session (again, it is OK to start small).   - Schedule exercise so that it is a priority in your life.   - Look for exercise videos online ([employer], amazon, etc).  - Use the outdoors as your gym when able.    Orders This Visit:  No orders of the defined types were placed in this encounter.      Meds This Visit:  Requested Prescriptions     Signed Prescriptions Disp Refills    polyethylene glycol, PEG 3350-KCl-NaBcb-NaCl-NaSulf, 236 g Oral Recon Soln 1 each 0     Sig: Take 4,000 mL by mouth As Directed. Take 2,000 mL the night before your procedure and 2,000 mL the morning of your procedure.       Imaging & Referrals:  None       Olive Ny MD  [facility] Gastroenterology  1/30/2024

## 2024-01-30 NOTE — PATIENT INSTRUCTIONS
1. Schedule upper endoscopy (EGD and colonoscopy with MAC [Diagnosis: SUSAN, pre-bariatric surgery].    2.  bowel prep from pharmacy (split dose golytely). If your prescription is not available and/or is cost-prohibitive, please contact the office as soon as possible to ensure you receive a bowel prep before your procedure.     3. Medication adjustments:       A. SEVEN DAYS BEFORE colonoscopy: HOLD ozempic      B. Continue ALL other medications as usual    4. Read all bowel prep instructions carefully.    5. AVOID seeds, nuts, popcorn, and raw fruits and vegetables (cooked is okay) for 2-3 days before procedure.    6. If you start any NEW medication after your visit today, please notify us. Certain medications will need to be held before the procedure or the procedure cannot be performed.     7. You will need a ride home from your procedure since you are receiving sedation. Please ensure you will have an available ride home or the procedure cannot be performed.         You have fat in the liver seen on ultrasound. Your risk factors for fatty liver disease are abdominal obesity, high blood pressure, high cholesterol.    A 4-10% body weight loss can reverse all aspects of fatty liver. The best diet for the liver is a low carbohydrate mediterranean diet. Work on reducing or eliminating carbohydrates (breads, pasta, rice, tortilla, potatoes, beans, corn, soda, sweet drinks or even excess fruit, desserts). Fill your plate with nonstarchy vegetables and lean protein (fish and chicken). Do not avoid healthy fats (from a plant or a fish).    Look into using low carbohydrate or no carbohydrate alternatives: cauliflower rice (needs to be seasoned) or cauliflower mash, zucchini noodles, low carb bread (healthy life bread has 11g net carbs per 2 slices, there are also no carb breads available (aldi \"zero net carb bread\", costco \"keto friendly bread\"), low carb tortillas (mission carb balance), cauliflower pizza crust  (careful as some are high carb from rice flour). Look online for low carb recipes.     Consider logging your diet on an alexey like Liibook. This will help us determine how much of your diet comes from carbohydrates, protein and fat. We generally aim to get no more than 30% of calories from carbs, 30-35% from protein and 40% from fat with most of the fat being from healthy plant or fish-based sources. Or aim for no more than 50-75g net carbs (total minus fiber) daily.    - In [employer], go to menu >> nutrition >> macros >> week view to see your nutrient distribution.    Exercise:   - Try and do a combination of exercises including aerobic (moving, sweating, short of breath), core strengthening (yoga, pilates), and strength training.  - Your goal is to be sweating, mildly short of breath, and have an elevated heart rate for roughly 30-45 minutes. You should pace yourself to achieve this and of course you may need to start slowly and ramp up this goal.  - Goal exercise = 4-5 times a week for 30-60 minutes per session (again, it is OK to start small).   - Schedule exercise so that it is a priority in your life.   - Look for exercise videos online ([employer], amazon, etc).  - Use the outdoors as your gym when able.

## 2024-02-13 ENCOUNTER — PATIENT MESSAGE (OUTPATIENT)
Facility: CLINIC | Age: 44
End: 2024-02-13

## 2024-02-14 NOTE — TELEPHONE ENCOUNTER
From: Yareli Lynch  To: Danae Ledesma  Sent: 2/13/2024 9:50 AM CST  Subject: Referral    [place] Doc,    We had talked about getting a new referral for my pulmonologist Dr Bentley, but they haven’t received it yet. Anything I need to do to get this expedited? Thanks,    Yareli

## 2024-03-06 ENCOUNTER — TELEPHONE (OUTPATIENT)
Facility: CLINIC | Age: 44
End: 2024-03-06

## 2024-03-06 NOTE — TELEPHONE ENCOUNTER
Pt has CLN/EGD on 6/10 - asking if she can get in sooner with this Dr or another - its part of pre op clearance for surgery

## 2024-03-11 NOTE — TELEPHONE ENCOUNTER
Called patient - explained Dr. Ny does not have an opening to accommodate a colonoscopy and EGD at [facility] at this time sooner than 6/10/2024. I added the patient to the wait list and advised I will call if I see something open up.    Patient is asking if she can see another provider sooner - I explained the majority of the providers are scheduling into June at [facility] but I would ask Dr. Ny if it is ok to see a different provider if there are any cancellations.    Routing to Dr. Ny to advise.

## 2024-03-12 NOTE — TELEPHONE ENCOUNTER
Patient is added to the wait list - will call if there are sooner openings with Dr. Ny or another provider at [facility].

## 2024-03-14 ENCOUNTER — TELEPHONE (OUTPATIENT)
Age: 44
End: 2024-03-14

## 2024-03-25 ENCOUNTER — OFFICE VISIT (OUTPATIENT)
Facility: CLINIC | Age: 44
End: 2024-03-25
Payer: COMMERCIAL

## 2024-03-25 ENCOUNTER — LAB ENCOUNTER (OUTPATIENT)
Dept: LAB | Facility: REFERENCE LAB | Age: 44
End: 2024-03-25
Attending: FAMILY MEDICINE
Payer: COMMERCIAL

## 2024-03-25 VITALS
HEIGHT: 63 IN | HEART RATE: 92 BPM | OXYGEN SATURATION: 96 % | WEIGHT: 288 LBS | SYSTOLIC BLOOD PRESSURE: 138 MMHG | DIASTOLIC BLOOD PRESSURE: 88 MMHG | BODY MASS INDEX: 51.03 KG/M2

## 2024-03-25 DIAGNOSIS — D22.9 SKIN MOLE: ICD-10-CM

## 2024-03-25 DIAGNOSIS — I10 ESSENTIAL HYPERTENSION: ICD-10-CM

## 2024-03-25 DIAGNOSIS — E87.6 HYPOKALEMIA: ICD-10-CM

## 2024-03-25 DIAGNOSIS — Z12.31 ENCOUNTER FOR SCREENING MAMMOGRAM FOR MALIGNANT NEOPLASM OF BREAST: ICD-10-CM

## 2024-03-25 DIAGNOSIS — J45.40 MODERATE PERSISTENT ASTHMA WITHOUT COMPLICATION (HCC): ICD-10-CM

## 2024-03-25 DIAGNOSIS — Z00.00 PHYSICAL EXAM, ANNUAL: Primary | ICD-10-CM

## 2024-03-25 DIAGNOSIS — E83.51 HYPOCALCEMIA: ICD-10-CM

## 2024-03-25 LAB
ANION GAP SERPL CALC-SCNC: 5 MMOL/L (ref 0–18)
BUN BLD-MCNC: 12 MG/DL (ref 9–23)
BUN/CREAT SERPL: 15.8 (ref 10–20)
CALCIUM BLD-MCNC: 8.9 MG/DL (ref 8.7–10.4)
CHLORIDE SERPL-SCNC: 108 MMOL/L (ref 98–112)
CO2 SERPL-SCNC: 30 MMOL/L (ref 21–32)
CREAT BLD-MCNC: 0.76 MG/DL
EGFRCR SERPLBLD CKD-EPI 2021: 98 ML/MIN/1.73M2 (ref 60–?)
FASTING STATUS PATIENT QL REPORTED: NO
GLUCOSE BLD-MCNC: 103 MG/DL (ref 70–99)
OSMOLALITY SERPL CALC.SUM OF ELEC: 296 MOSM/KG (ref 275–295)
POTASSIUM SERPL-SCNC: 3.4 MMOL/L (ref 3.5–5.1)
SODIUM SERPL-SCNC: 143 MMOL/L (ref 136–145)

## 2024-03-25 PROCEDURE — 80048 BASIC METABOLIC PNL TOTAL CA: CPT

## 2024-03-25 PROCEDURE — 36415 COLL VENOUS BLD VENIPUNCTURE: CPT

## 2024-03-25 RX ORDER — DILTIAZEM HYDROCHLORIDE 240 MG/1
240 CAPSULE, COATED, EXTENDED RELEASE ORAL DAILY
Qty: 90 CAPSULE | Refills: 0 | Status: SHIPPED | OUTPATIENT
Start: 2024-03-25

## 2024-03-25 RX ORDER — LOSARTAN POTASSIUM 25 MG/1
25 TABLET ORAL EVERY 24 HOURS
Qty: 90 TABLET | Refills: 0 | Status: SHIPPED | OUTPATIENT
Start: 2024-03-25

## 2024-03-25 NOTE — PROGRESS NOTES
HPI:   Yareli Lynch is a 44 year old female who presents for a complete physical exam.     Was hospitalized in 01/2024 and dx with asthma. Overall feeling much better since hospital discharge, and states her asthma and BP have been well-controlled.   Takes symbicort 1 puff daily. Weaned from 1 puff BID a couple weeks ago per pulmonology. Has f/u in 3 months. Sees Dr. Bentley.   Hasn't used albuterol for weeks.     Is waiting for bariatric surgery, which was postponed 2/2 hospital stay.     HTN:   BP was elevated during hospital stay. Medications were adjusted. Had cardiac w/u while inpatient.   Saw cardiology outpatient after hospital stay.   BP has been well-controlled and usually 130/70.   Ran out of diltiazem on Saturday. Didn't know she was supposed to continue losartan, so hasn't been taking in >1 month.     Was told to stop using CPAP until cough resolved.     Has mole on left lower leg and would like to see dermatology.     Last pap: negative cotesting in 01/2022 with Dr. Mesa.  Last mammogram: Overdue  Previous colonoscopy: Many 2/2 hx of IBS per patient. Has internal hemorrhoids, but otherwise scopes have been normal per patient. Plans to schedule colonoscopy & EGD with GI.   Family hx of breast, ovarian, cervical or colon CA: See FH  Diet and exercise: Diet is okay.  Immunizations:  Tdap: 11/2021, PCV20: To consider, Covid: consider new booster    REVIEW OF SYSTEMS:   GENERAL: feels well otherwise. See HPI  SKIN: see HPI  EYES: no vision problems  BREAST: negative  LUNGS: denies shortness of breath  CARDIOVASCULAR: denies chest pain  GI: denies abdominal pain,  No constipation or diarrhea, no hematochezia or melena  : negative  NEURO: denies headaches  PSYCHE: denies depression or anxiety          Current Outpatient Medications   Medication Sig Dispense Refill    dilTIAZem  MG Oral Capsule SR 24 Hr Take 1 capsule (240 mg total) by mouth daily. 90 capsule 0    losartan 25 MG Oral Tab Take 1  tablet (25 mg total) by mouth daily. 90 tablet 0    montelukast 10 MG Oral Tab Take 1 tablet (10 mg total) by mouth nightly.      hydroCHLOROthiazide 25 MG Oral Tab Take 1 tablet (25 mg total) by mouth daily. 90 tablet 0    albuterol (2.5 MG/3ML) 0.083% Inhalation Nebu Soln Take 3 mL (2.5 mg total) by nebulization every 4 (four) hours as needed for Wheezing or Shortness of Breath. 120 mL 0    Budesonide-Formoterol Fumarate (SYMBICORT) 160-4.5 MCG/ACT Inhalation Aerosol Inhale 2 puffs into the lungs 2 (two) times daily. Rinse mouth, gargle, and spit to follow. 1 each 0    albuterol 108 (90 Base) MCG/ACT Inhalation Aero Soln Inhale 1-2 puffs into the lungs every 4 to 6 hours as needed for Wheezing. 18 g 0    semaglutide (OZEMPIC, 0.25 OR 0.5 MG/DOSE,) 2 MG/3ML Subcutaneous Solution Pen-injector Inject 0.5 mg into the skin once a week. 3 mL 3    ergocalciferol 1.25 MG (21201 UT) Oral Cap Take 1 capsule (50,000 Units total) by mouth once a week. (Patient not taking: Reported on 3/25/2024)      polyethylene glycol, PEG 3350-KCl-NaBcb-NaCl-NaSulf, 236 g Oral Recon Soln Take 4,000 mL by mouth As Directed. Take 2,000 mL the night before your procedure and 2,000 mL the morning of your procedure. 1 each 0    Ferrous Sulfate 325 (65 Fe) MG Oral Tab Take 1 tablet (325 mg total) by mouth daily. (Patient not taking: Reported on 3/25/2024)       Allergies   Allergen Reactions    Lisinopril Coughing and UNKNOWN      Past Medical History:   Diagnosis Date    Allergic rhinitis 2019    Anxiety 2000    DVT (deep vein thrombosis) in pregnancy ([facility]) 05/2021    Encounter for therapeutic drug monitoring 08/09/2023    Essential hypertension 2000    Gestational diabetes ([facility])     High myopia 1986    History of IBS     History of PCOS     Hypertension     Infertility, female     Morbid obesity with BMI of 45.0-49.9, adult ([facility])     Obesity     PTSD (post-traumatic stress disorder)       Past Surgical History:   Procedure Laterality Date       2019, 2021    COLONOSCOPY  2018    IVF PACKAGE  2018    OTHER SURGICAL HISTORY      egg retrieval for IVF      Family History   Problem Relation Age of Onset    Thyroid Disorder Father     Diabetes Father     Diabetes Mother     Hypertension Mother     No Known Problems Maternal Grandmother     No Known Problems Maternal Grandfather     Glaucoma Neg     Retinal detachment Neg     Macular degeneration Neg       Social History:   Social History     Socioeconomic History    Marital status:    Occupational History    Occupation: health    Tobacco Use    Smoking status: Former    Smokeless tobacco: Never    Tobacco comments:     On and off   Vaping Use    Vaping Use: Never used   Substance and Sexual Activity    Alcohol use: No    Drug use: No    Sexual activity: Yes     Partners: Female     Comment: with wife    Other Topics Concern    Blood Transfusions No   Social History Narrative    No H/O abuse     Lives with wife Hallie     Social Determinants of Health     Financial Resource Strain: Low Risk  (2024)    Financial Resource Strain     Med Affordability: No   Food Insecurity: No Food Insecurity (2024)    Food Insecurity     Food Insecurity: Never true   Transportation Needs: No Transportation Needs (2024)    Transportation Needs     Lack of Transportation: No   Housing Stability: Low Risk  (2024)    Housing Stability     Housing Instability: No            EXAM:     Wt Readings from Last 6 Encounters:   24 288 lb (130.6 kg)   24 287 lb 9.6 oz (130.5 kg)   24 285 lb 4 oz (129.4 kg)   24 276 lb 6.4 oz (125.4 kg)   24 276 lb (125.2 kg)   24 286 lb (129.7 kg)     Body mass index is 51.02 kg/m².   /88   Pulse 92   Ht 5' 3\" (1.6 m)   Wt 288 lb (130.6 kg)   LMP 2024   SpO2 96%   BMI 51.02 kg/m²     GENERAL: well developed, well nourished, in no apparent distress   SKIN: no rashes, no suspicious lesions. Round  well-demarcated brown macular lesion on left anterior lower leg roughly 7mm in diameter.   HEENT: atraumatic, normocephalic, throat clear; normal dentition. MMM. TM & EAC normal b/l.   EYES: PERRLA, EOMI, conjunctiva are clear  NECK: supple, no adenopathy or thyroid masses   LUNGS: clear to auscultation  CARDIO: RRR without murmur  GI: good bowel sounds, no masses, HSM or tenderness  EXTREMITIES: no edema  NEURO: Alert & oriented, motor & sensation grossly intact and symmetric    Cholesterol, Total (mg/dL)   Date Value   01/14/2024 160   09/07/2023 158   02/28/2022 184     HDL Cholesterol (mg/dL)   Date Value   01/14/2024 49   09/07/2023 37 (L)   02/28/2022 51     LDL Cholesterol (mg/dL)   Date Value   01/14/2024 96   09/07/2023 80   02/28/2022 104 (H)      ASSESSMENT AND PLAN:   Yareli Lynch is a 44 year old female who presents for a complete physical exam.  Encounter Diagnoses   Name Primary?    Physical exam, annual Yes    Essential hypertension     Hypokalemia     Moderate persistent asthma without complication (HCC)     Skin mole     Encounter for screening mammogram for malignant neoplasm of breast      No orders of the defined types were placed in this encounter.      -Reviewed recent discharge summary.  -HTN: Elevated today 2/2 confusion regarding medications. To resume diltiazem & losartan. Rx's sent. She is also due for cardiology f/u & will schedule.   -Hypokalemia: Reminded to complete BMP order per Dr. Ledesma. Of note, she is not currently taking any potassium supplements.   -Asthma: Much better-controlled. CPM. Sees pulmonology.   -Skin moles: Refer to dermatology.   -No indication to repeat any other labs at this time given inpatient w/u.   -Overdue for mammo.  -To schedule colonoscopy & EGD with GI.   -RTC in 1 year for next annual so long as she sees cardiology & pulmonology.     Discussed with patient the following:  -Breast cancer screening/mammograms and clinical breast exams  -Cervical cancer  screening/pap smears  -Colon cancer screening/colonoscopy  -Adequate calcium and Vitamin D intake to prevent osteoporosis  -Bone density screening for osteopenia/osteoporosis  -Healthy diet including adequate intake of vegetables and fruits, appropriate portion sizes, minimizing highly concentrated carbohydrate foods  -Exercising 30 minutes a day most days of the week   -Diabetes screening    -Cholesterol screening   -Recommendation for yearly influenza vaccine  -Need for Tdap once as an adult and Td booster every 10 years    All questions were answered during the visit and the patient verbalizes understanding. She will return in one year for next WWE or sooner as needed.    Meds & Refills for this Visit:  Requested Prescriptions     Signed Prescriptions Disp Refills    dilTIAZem  MG Oral Capsule SR 24 Hr 90 capsule 0     Sig: Take 1 capsule (240 mg total) by mouth daily.    losartan 25 MG Oral Tab 90 tablet 0     Sig: Take 1 tablet (25 mg total) by mouth daily.       Imaging & Consults:  DERM - INTERNAL  DINA CARLOS 2D+3D SCREENING BILAT (CPT=77067/46026)    Yo Winchester,   3/25/2024  11:35 AM

## 2024-03-26 DIAGNOSIS — E87.6 HYPOKALEMIA: Primary | ICD-10-CM

## 2024-04-12 NOTE — TELEPHONE ENCOUNTER
Received rx request for Symbicort. Sent patient mychart informing that appt is needed for medication refill to be approved. Provided patient with phone number to schedule appt

## 2024-04-16 RX ORDER — BUDESONIDE AND FORMOTEROL FUMARATE DIHYDRATE 160; 4.5 UG/1; UG/1
2 AEROSOL RESPIRATORY (INHALATION) 2 TIMES DAILY
Qty: 10.2 EACH | Refills: 0 | OUTPATIENT
Start: 2024-04-16

## 2024-04-16 NOTE — TELEPHONE ENCOUNTER
Called patient to schedule an appointment. Patient informed that she already has a pulmonologist, Dr Campa. She stated she will have medication refilled there

## 2024-04-26 ENCOUNTER — TELEPHONE (OUTPATIENT)
Facility: CLINIC | Age: 44
End: 2024-04-26

## 2024-04-26 NOTE — TELEPHONE ENCOUNTER
----- Message from Yareli Lynch sent at 4/25/2024  5:51 PM CDT -----  Regarding: Earlier appt  Contact: [phone]  Hello,  I have a colonoscopy and endoscopy scheduled for June. I am having some bleeding in my stool. I’m also having pain from being my IBS.  Can I get in a little earlier?

## 2024-04-29 DIAGNOSIS — R05.2 SUBACUTE COUGH: ICD-10-CM

## 2024-04-30 ENCOUNTER — TELEPHONE (OUTPATIENT)
Facility: CLINIC | Age: 44
End: 2024-04-30

## 2024-04-30 NOTE — TELEPHONE ENCOUNTER
Called patient - rescheduled her colonoscopy and EGD with Dr. Ny for 5/2/2024 at [facility].    Please refer to [facility] dated 1/30/2024 for scheduling orders.    [facility] closed.

## 2024-04-30 NOTE — TELEPHONE ENCOUNTER
Surgery schedulers- please see message below from Dr. Ny. Thank you!    Olive Ny MDYesterday (1:10 PM)     Can she have scope at [facility]? Or no because of insurance and/or BMI? I have openings this week.          Note

## 2024-05-01 DIAGNOSIS — E11.9 TYPE 2 DIABETES MELLITUS WITHOUT COMPLICATION, WITHOUT LONG-TERM CURRENT USE OF INSULIN (HCC): ICD-10-CM

## 2024-05-01 RX ORDER — SEMAGLUTIDE 0.68 MG/ML
0.5 INJECTION, SOLUTION SUBCUTANEOUS WEEKLY
Qty: 3 ML | Refills: 5 | Status: SHIPPED | OUTPATIENT
Start: 2024-05-01 | End: 2024-05-31

## 2024-05-01 RX ORDER — ALBUTEROL SULFATE 90 UG/1
1-2 AEROSOL, METERED RESPIRATORY (INHALATION)
Qty: 18 G | Refills: 3 | Status: SHIPPED | OUTPATIENT
Start: 2024-05-01

## 2024-05-01 NOTE — PAT NURSING NOTE
Patient states she last took her Ozempic injection on 4/22. Instructed patient not to take another dose until after her procedure per Anesthesia protocol.    Patient has a hx of asthma; instructed patient to bring her inhaler with.     Patient instructed to hold her vitamins and supplements prior to procedure per the Pre-surgical testing policy.

## 2024-05-01 NOTE — TELEPHONE ENCOUNTER
Protocol Failed/ No Protocol    Requested Prescriptions   Pending Prescriptions Disp Refills    albuterol 108 (90 Base) MCG/ACT Inhalation Aero Soln 18 g 0     Sig: Inhale 1-2 puffs into the lungs every 4 to 6 hours as needed for Wheezing.       Asthma & COPD Medication Protocol Failed - 4/29/2024  3:20 PM        Failed - Asthma Action Score greater than or equal to 20        Passed - Appointment in past 6 or next 3 months      Recent Outpatient Visits              1 month ago Physical exam, annual    [facility] Yo Winchester DO    Office Visit    3 months ago Iron deficiency anemia, unspecified iron deficiency anemia type    [facility] Olive Allen MD    Office Visit    3 months ago Moderate persistent asthma with acute exacerbation (HCC)    [facility] Danae Ledesma MD    Office Visit    3 months ago Subacute cough    [facility] Danae Ledesma MD    Office Visit    4 months ago HTN (hypertension), benign [facility]I10[facility]          Future Appointments         Provider Department Appt Notes    In 2 days [facility]    In 1 week Neftali Jackson MD [facility] Follow [facility]    In 3 weeks Alda Parra RD [facility]                     Passed - AAP/ACT given in last 12 months     Yes  Yes  Yes  6               Future Appointments         Provider Department Appt Notes    In 2 days [facility]IF ALLEN [facility]    In 1 week [facility]ali Jackson [facility]    [facility]           Recent Outpatient Visits              1 month ago Physical exam, annual    [facility] Yo Winchester DO    Office Visit    3 months ago Iron deficiency anemia, unspecified iron deficiency anemia type    [facility]    Office Visit    3 months ago Moderate persistent asthma with acute exacerbation (HCC)    [facility] Danae Ledesma MD    Office Visit    3 months ago Subacute cough    [facility] Danae Ledesma MD    Office Visit    4 months ago HTN (hypertension), benign [facility]I10[facility]    [facility]

## 2024-05-02 ENCOUNTER — HOSPITAL ENCOUNTER (OUTPATIENT)
Facility: HOSPITAL | Age: 44
Setting detail: HOSPITAL OUTPATIENT SURGERY
Discharge: HOME | End: 2024-05-02
Attending: INTERNAL MEDICINE | Admitting: INTERNAL MEDICINE
Payer: COMMERCIAL

## 2024-05-02 ENCOUNTER — TELEPHONE (OUTPATIENT)
Facility: CLINIC | Age: 44
End: 2024-05-02

## 2024-05-02 ENCOUNTER — ANESTHESIA EVENT (OUTPATIENT)
Dept: ENDOSCOPY | Facility: HOSPITAL | Age: 44
End: 2024-05-02
Payer: COMMERCIAL

## 2024-05-02 ENCOUNTER — ANESTHESIA (OUTPATIENT)
Dept: ENDOSCOPY | Facility: HOSPITAL | Age: 44
End: 2024-05-02
Payer: COMMERCIAL

## 2024-05-02 VITALS
HEART RATE: 90 BPM | OXYGEN SATURATION: 100 % | BODY MASS INDEX: 51.03 KG/M2 | HEIGHT: 63 IN | SYSTOLIC BLOOD PRESSURE: 124 MMHG | TEMPERATURE: 97 F | DIASTOLIC BLOOD PRESSURE: 65 MMHG | WEIGHT: 288 LBS | RESPIRATION RATE: 22 BRPM

## 2024-05-02 DIAGNOSIS — D50.9 IRON DEFICIENCY ANEMIA, UNSPECIFIED IRON DEFICIENCY ANEMIA TYPE: ICD-10-CM

## 2024-05-02 DIAGNOSIS — Z71.89 ENCOUNTER FOR PRE-BARIATRIC SURGERY COUNSELING AND EDUCATION: ICD-10-CM

## 2024-05-02 LAB — B-HCG UR QL: NEGATIVE

## 2024-05-02 PROCEDURE — 0DB98ZX EXCISION OF DUODENUM, VIA NATURAL OR ARTIFICIAL OPENING ENDOSCOPIC, DIAGNOSTIC: ICD-10-PCS | Performed by: INTERNAL MEDICINE

## 2024-05-02 PROCEDURE — 43239 EGD BIOPSY SINGLE/MULTIPLE: CPT | Performed by: INTERNAL MEDICINE

## 2024-05-02 PROCEDURE — 0DBL8ZX EXCISION OF TRANSVERSE COLON, VIA NATURAL OR ARTIFICIAL OPENING ENDOSCOPIC, DIAGNOSTIC: ICD-10-PCS | Performed by: INTERNAL MEDICINE

## 2024-05-02 PROCEDURE — 0DB68ZX EXCISION OF STOMACH, VIA NATURAL OR ARTIFICIAL OPENING ENDOSCOPIC, DIAGNOSTIC: ICD-10-PCS | Performed by: INTERNAL MEDICINE

## 2024-05-02 PROCEDURE — 45385 COLONOSCOPY W/LESION REMOVAL: CPT | Performed by: INTERNAL MEDICINE

## 2024-05-02 RX ORDER — HYDRALAZINE HYDROCHLORIDE 20 MG/ML
INJECTION INTRAMUSCULAR; INTRAVENOUS AS NEEDED
Status: DISCONTINUED | OUTPATIENT
Start: 2024-05-02 | End: 2024-05-02 | Stop reason: SURG

## 2024-05-02 RX ORDER — HYDROCORTISONE ACETATE 25 MG/1
25 SUPPOSITORY RECTAL 2 TIMES DAILY
Qty: 20 SUPPOSITORY | Refills: 0 | Status: SHIPPED | OUTPATIENT
Start: 2024-05-02 | End: 2024-05-12

## 2024-05-02 RX ORDER — MIDAZOLAM HYDROCHLORIDE 1 MG/ML
INJECTION INTRAMUSCULAR; INTRAVENOUS AS NEEDED
Status: DISCONTINUED | OUTPATIENT
Start: 2024-05-02 | End: 2024-05-02 | Stop reason: SURG

## 2024-05-02 RX ORDER — ESMOLOL HYDROCHLORIDE 10 MG/ML
INJECTION INTRAVENOUS AS NEEDED
Status: DISCONTINUED | OUTPATIENT
Start: 2024-05-02 | End: 2024-05-02 | Stop reason: SURG

## 2024-05-02 RX ORDER — SODIUM CHLORIDE, SODIUM LACTATE, POTASSIUM CHLORIDE, CALCIUM CHLORIDE 600; 310; 30; 20 MG/100ML; MG/100ML; MG/100ML; MG/100ML
INJECTION, SOLUTION INTRAVENOUS CONTINUOUS
Status: DISCONTINUED | OUTPATIENT
Start: 2024-05-02 | End: 2024-05-02

## 2024-05-02 RX ORDER — LABETALOL HYDROCHLORIDE 5 MG/ML
INJECTION, SOLUTION INTRAVENOUS AS NEEDED
Status: DISCONTINUED | OUTPATIENT
Start: 2024-05-02 | End: 2024-05-02 | Stop reason: SURG

## 2024-05-02 RX ADMIN — LABETALOL HYDROCHLORIDE 10 MG: 5 INJECTION, SOLUTION INTRAVENOUS at 08:51:00

## 2024-05-02 RX ADMIN — HYDRALAZINE HYDROCHLORIDE 20 MG: 20 INJECTION INTRAMUSCULAR; INTRAVENOUS at 08:57:00

## 2024-05-02 RX ADMIN — MIDAZOLAM HYDROCHLORIDE 2 MG: 1 INJECTION INTRAMUSCULAR; INTRAVENOUS at 08:45:00

## 2024-05-02 RX ADMIN — SODIUM CHLORIDE, SODIUM LACTATE, POTASSIUM CHLORIDE, CALCIUM CHLORIDE: 600; 310; 30; 20 INJECTION, SOLUTION INTRAVENOUS at 09:37:00

## 2024-05-02 RX ADMIN — ESMOLOL HYDROCHLORIDE 10 MG: 10 INJECTION INTRAVENOUS at 09:01:00

## 2024-05-02 NOTE — TELEPHONE ENCOUNTER
Current Outpatient Medications   Medication Sig Dispense Refill    hydrocortisone 25 MG Rectal Suppos Place 1 suppository (25 mg total) rectally 2 (two) times daily for 10 days. 20 suppository 0       Not covered by insurance-alternative requested.

## 2024-05-02 NOTE — OPERATIVE REPORT
ESOPHAGOGASTRODUODENOSCOPY (EGD) & COLONOSCOPY REPORT    Yareli PEDERSEN Lynch     3/23/1980 Age 44 year old   PCP Yo Winchester DO Endoscopist Olive Ny MD       Date of procedure: 24    Procedure: EGD w/ cold forcep biopsies & Colonoscopy w/ cold snare polypectomy     Pre-operative diagnosis: SUSAN, pre-bariatric screening, rectal bleeding     Post-operative diagnosis: gastric polyps, colon polyps, hemorrhoids     Medications: MAC    Withdrawal time: 18 minutes    Complications: none    Procedure: Informed consent was obtained from the patient after the risks of the procedure were discussed, including but not limited to bleeding, perforation, aspiration, infection, or possibility of a missed lesion. We discussed the risks/benefits and alternatives to this procedure, as well as the planned sedation. EGD procedure: The patient was placed in the left lateral decubitus position and begun on continuous blood pressure pulse oximetry and EKG monitoring and this was maintained throughout the procedure. Once an adequate level of sedation was obtained a bite block was placed. Then the lubricated tip of the [id number] diagnostic video upper endoscope was inserted and advanced using direct visualization into the posterior pharynx and ultimately into the esophagus with  distal extent of the second portion of the duodenum.     Colonoscopy procedure: Once an adequate level of sedation was obtained a digital rectal exam was completed. Then the lubricated tip of the [id number] diagnostic video colonoscope was inserted and advanced without difficulty to the cecum using the CO2 insufflation technique. The cecum was identified by localizing the trifold, the appendix and the ileocecal valve. A routine second examination of the cecum/ascending colon was performed. Withdrawal was begun with thorough washing and careful examination of the colonic walls and folds. Photodocumentation was obtained. The bowel prep  was excellent. Views of the colon were excellent with washing. I then carefully withdrew the instrument from the patient who tolerated the procedure well.     Complications: None    EGD findings:      1. Esophagus: The squamocolumnar junction was noted at 41 cm and appeared regular. The diaphragmatic pinch was noted noted at 41 cm from the incisors. The esophageal mucosa appeared normal. There was no evidence of esophagitis, stricture or endoscopic evidence of Sidhu's esophagus.  2. Stomach: The stomach distended normally. Normal rugal folds were seen. The pylorus was patent. Retroflexion revealed a normal fundus. The gastric mucosa appeared normal. Multiple gastric polyps were noted, largest size about 1-1.5 cm. A representative sample were biopsied.  3. Duodenum: The duodenal mucosa appeared normal in the bulb and 2nd portion of the duodenum. Biopsied for celiac disease.     EGD Impression:  -Esophagus: Normal.  -Stomach:  Multiple gastric polyps, largest sizes 1-1.5 cm. Biopsied.   -Duodenum: Normal. Biopsied.     Colonoscopy findings:    1. Three polyp noted as follows:      A. THREE -- 4-6 mm polyps in the transverse colon; sessile morphology; cold snare polypectomy performed, polyps retrieved.  2. Diverticulosis: none.  3. Ileocecal valve appeared normal. Terminal ileum unable to be intubated due to excessive belly breathing.   4. The colonic mucosa throughout the colon showed normal vascular pattern, without evidence of angioectasias or inflammation.   5. A retroflexed view of the rectum revealed small internal hemorrhoids.  6. CARLOS: normal rectal tone, no masses palpated.     Colonoscopy Impression:  Three sub-centimeter polyps removed.   Small internal hemorrhoids. Rectal bleeding likely related to hemorrhoids.   Colon was otherwise normal with glistening mucosa and intact vascular pattern throughout.    No clear findings on EGD or colonoscopy to explain SUSAN.     Recommend:  Await pathology. The interval  for the next colonoscopy will be determined after reviewing pathology. If new signs or symptoms develop, colonoscopy may need to be repeated sooner.   High fiber diet.  Monitor for blood in the stool. If having more than just tinge of blood, call office or go to the ER.  Avoid NSAIDs (motrin, ibuprofen, aleve, advil, naproxen, midol, naprosyn, excedrin) for 14 days.   Continue iron supplementation. Repeat iron studies in 3 months to determine need for VCE.   Use a fiber supplement daily (metamucil 1 scoop daily to start) for hemorrhoids.   Use anusol suppositories x 10 days. If rectal bleeding does not resolve, will refer to CRS.     >>>If tissue was obtained and you have not received your pathology results either by phone or letter within 2 weeks, please call our office at [phone].    Specimens: colon polyps, duodenal biopsies     Blood loss: <1 ml

## 2024-05-02 NOTE — ANESTHESIA PREPROCEDURE EVALUATION
Anesthesia PreOp Note    HPI:     Yareli Lynch is a 44 year old female who presents for preoperative consultation requested by: Olive Ny MD    Date of Surgery: 2024    Procedure(s):  COLONOSCOPY/ ESOPHAGOGASTRODUODENOSCOPY (EGD)  ESOPHAGOGASTRODUODENOSCOPY (EGD)  Indication: Iron deficiency anemia, unspecified iron deficiency anemia type/ Encounter for pre-bariatric surgery counseling and education    Relevant Problems   No relevant active problems       NPO:  Last Liquid Consumption Date: 24  Last Liquid Consumption Time: 0500  Last Solid Consumption Date: 24  Last Solid Consumption Time: 0900  Last Liquid Consumption Date: 24          History Review:  Patient Active Problem List    Diagnosis Date Noted    Moderate persistent asthma without complication ([facility]) 2024    Moderate persistent asthma with acute exacerbation ([facility]) 2024    History of non-ST elevation myocardial infarction (NSTEMI) 2024    Type 2 diabetes mellitus without complication, without long-term current use of insulin ([facility]) 2023    Morbid obesity with BMI of 40.0-44.9, adult ([facility]) 2022    HTN (hypertension), benign 2022    DVT (deep vein thrombosis) in pregnancy ([facility]) 2021    IBS (irritable bowel syndrome) 06/10/2019    PCOS (polycystic ovarian syndrome) 01/10/2019       Past Medical History:    Allergic rhinitis    Anxiety    Asthma ([facility])    DVT (deep vein thrombosis) in pregnancy ([facility])    Encounter for therapeutic drug monitoring    Essential hypertension    Gestational diabetes ([facility])    High blood pressure    High myopia    History of IBS    History of PCOS    Hypertension    Infertility, female    Morbid obesity with BMI of 45.0-49.9, adult ([facility])    Obesity    PTSD (post-traumatic stress disorder)    Sleep apnea       Past Surgical History:   Procedure Laterality Date      2019, 2021    Colonoscopy  2018    Ivf package  2018    Other  surgical history      egg retrieval for IVF       Medications Prior to Admission   Medication Sig Dispense Refill Last Dose    albuterol 108 (90 Base) MCG/ACT Inhalation Aero Soln Inhale 1-2 puffs into the lungs every 4 to 6 hours as needed for Wheezing. 18 g 3  at prn    dilTIAZem  MG Oral Capsule SR 24 Hr Take 1 capsule (240 mg total) by mouth daily. 90 capsule 0 5/1/2024    losartan 25 MG Oral Tab Take 1 tablet (25 mg total) by mouth daily. 90 tablet 0 5/1/2024    Ferrous Sulfate 325 (65 Fe) MG Oral Tab Take 1 tablet (325 mg total) by mouth daily.   4/22/2024    montelukast 10 MG Oral Tab Take 1 tablet (10 mg total) by mouth nightly.   5/1/2024    hydroCHLOROthiazide 25 MG Oral Tab Take 1 tablet (25 mg total) by mouth daily. 90 tablet 0 5/1/2024    albuterol (2.5 MG/3ML) 0.083% Inhalation Nebu Soln Take 3 mL (2.5 mg total) by nebulization every 4 (four) hours as needed for Wheezing or Shortness of Breath. 120 mL 0  at prn    Budesonide-Formoterol Fumarate (SYMBICORT) 160-4.5 MCG/ACT Inhalation Aerosol Inhale 2 puffs into the lungs 2 (two) times daily. Rinse mouth, gargle, and spit to follow. 1 each 0 5/1/2024    semaglutide (OZEMPIC, 0.25 OR 0.5 MG/DOSE,) 2 MG/3ML Subcutaneous Solution Pen-injector Inject 0.5 mg into the skin once a week. 3 mL 5 4/22/2024    ergocalciferol 1.25 MG (66201 UT) Oral Cap Take 1 capsule (50,000 Units total) by mouth once a week. (Patient not taking: Reported on 3/25/2024)       polyethylene glycol, PEG 3350-KCl-NaBcb-NaCl-NaSulf, 236 g Oral Recon Soln Take 4,000 mL by mouth As Directed. Take 2,000 mL the night before your procedure and 2,000 mL the morning of your procedure. 1 each 0      Current Facility-Administered Medications Ordered in Epic   Medication Dose Route Frequency Provider Last Rate Last Admin    lactated ringers infusion   Intravenous Continuous RzepcOlive johns MD 20 mL/hr at 05/02/24 0759 New Bag at 05/02/24 0759     No current Epic-ordered  outpatient medications on file.       Allergies   Allergen Reactions    Lisinopril Coughing and UNKNOWN       Family History   Problem Relation Age of Onset    Thyroid Disorder Father     Diabetes Father     Diabetes Mother     Hypertension Mother     No Known Problems Maternal Grandmother     No Known Problems Maternal Grandfather     Glaucoma Neg     Retinal detachment Neg     Macular degeneration Neg      Social History     Socioeconomic History    Marital status:    Occupational History    Occupation: health    Tobacco Use    Smoking status: Former    Smokeless tobacco: Never    Tobacco comments:     On and off   Vaping Use    Vaping status: Never Used   Substance and Sexual Activity    Alcohol use: No    Drug use: No    Sexual activity: Yes     Partners: Female     Comment: with wife    Other Topics Concern    Blood Transfusions No       Available pre-op labs reviewed.  Lab Results   Component Value Date    URINEPREG Negative 05/02/2024     Lab Results   Component Value Date     03/25/2024    K 3.4 (L) 03/25/2024     03/25/2024    CO2 30.0 03/25/2024    BUN 12 03/25/2024    CREATSERUM 0.76 03/25/2024     (H) 03/25/2024    CA 8.9 03/25/2024          Vital Signs:  Body mass index is 51.02 kg/m².   height is 1.6 m (5' 3\") and weight is 130.6 kg (288 lb). Her blood pressure is 146/73 and her pulse is 83. Her respiration is 22 and oxygen saturation is 98%.   Vitals:    05/01/24 0954 05/02/24 0747   BP:  146/73   Pulse:  83   Resp:  22   SpO2:  98%   Weight: 130.6 kg (288 lb)    Height: 1.6 m (5' 3\")         Anesthesia Evaluation      No history of anesthetic complications   Airway   Mallampati: II  TM distance: >3 FB  Neck ROM: full  Dental      Pulmonary     breath sounds clear to auscultation  (+) asthma, sleep apnea  (-) COPD, decreased breath sounds, wheezes  Cardiovascular   Exercise tolerance: good  (+) hypertension  (-) pacemaker, murmur    Rhythm: regular  Rate: normal     Neuro/Psych    (-) seizures    GI/Hepatic/Renal    (-) GERD    Endo/Other    (-) diabetes mellitus  Abdominal                  Anesthesia Plan:   ASA:  3  Informed Consent Plan and Risks Discussed With:  Patient      I have informed Yareli Lynch and/or legal guardian or family member of the nature of the anesthetic plan, benefits, risks including possible dental damage if relevant, major complications, and any alternative forms of anesthetic management.   All of the patient's questions were answered to the best of my ability. The patient desires the anesthetic management as planned.  Travon Talavera MD  5/2/2024 6:45 AM  Present on Admission:  **None**

## 2024-05-02 NOTE — TELEPHONE ENCOUNTER
Attempted to call patient  LMTCB    Need to see if she is agreeable to a mail order pharmacy for the medication below

## 2024-05-02 NOTE — ANESTHESIA POSTPROCEDURE EVALUATION
Patient: Yareli Lynch    Procedure Summary       Date: 05/02/24 Room / Location: [facility] ENDOSCOPY 03 / EM ENDOSCOPY    Anesthesia Start: 0830 Anesthesia Stop: 0937    Procedures:       COLONOSCOPY      ESOPHAGOGASTRODUODENOSCOPY (EGD) Diagnosis:       Iron deficiency anemia, unspecified iron deficiency anemia type      Encounter for pre-bariatric surgery counseling and education      (colon polyps, hemorrhoids, gastric polyps)    Surgeons: Olive Ny MD Anesthesiologist: Travon Talavera MD    Anesthesia Type: MAC ASA Status: 3            Anesthesia Type: MAC    Vitals Value Taken Time   /65 05/02/24 1025   Temp 96.9 °F (36.1 °C) 05/02/24 1000   Pulse 86 05/02/24 1028   Resp 22 05/02/24 1028   SpO2 99 % 05/02/24 1028   Vitals shown include unfiled device data.    [facility] AN Post Evaluation:   Patient Evaluated in PACU  Patient Participation: complete - patient participated  Level of Consciousness: awake and alert  Pain Management: adequate  Airway Patency:patent  Dental exam unchanged from preop  Yes    Nausea/Vomiting: none  Cardiovascular Status: hemodynamically stable  Respiratory Status: room air  Postoperative Hydration euvolemic      Travon Talavera MD  5/2/2024 10:59 AM

## 2024-05-02 NOTE — H&P
History & Physical Examination    Patient Name: Yareli Lynch  MRN: [id number]  [facility]: [id number]  YOB: 1980    Diagnosis: Iron deficiency anemia, pre-bariatric screening, rectal bleeding -- EGD and Colonoscopy today    Medications Prior to Admission   Medication Sig Dispense Refill Last Dose    albuterol 108 (90 Base) MCG/ACT Inhalation Aero Soln Inhale 1-2 puffs into the lungs every 4 to 6 hours as needed for Wheezing. 18 g 3  at prn    dilTIAZem  MG Oral Capsule SR 24 Hr Take 1 capsule (240 mg total) by mouth daily. 90 capsule 0     losartan 25 MG Oral Tab Take 1 tablet (25 mg total) by mouth daily. 90 tablet 0     Ferrous Sulfate 325 (65 Fe) MG Oral Tab Take 1 tablet (325 mg total) by mouth daily.       montelukast 10 MG Oral Tab Take 1 tablet (10 mg total) by mouth nightly.       hydroCHLOROthiazide 25 MG Oral Tab Take 1 tablet (25 mg total) by mouth daily. 90 tablet 0     albuterol (2.5 MG/3ML) 0.083% Inhalation Nebu Soln Take 3 mL (2.5 mg total) by nebulization every 4 (four) hours as needed for Wheezing or Shortness of Breath. 120 mL 0  at prn    Budesonide-Formoterol Fumarate (SYMBICORT) 160-4.5 MCG/ACT Inhalation Aerosol Inhale 2 puffs into the lungs 2 (two) times daily. Rinse mouth, gargle, and spit to follow. 1 each 0     semaglutide (OZEMPIC, 0.25 OR 0.5 MG/DOSE,) 2 MG/3ML Subcutaneous Solution Pen-injector Inject 0.5 mg into the skin once a week. 3 mL 5     ergocalciferol 1.25 MG (21590 UT) Oral Cap Take 1 capsule (50,000 Units total) by mouth once a week. (Patient not taking: Reported on 3/25/2024)       polyethylene glycol, PEG 3350-KCl-NaBcb-NaCl-NaSulf, 236 g Oral Recon Soln Take 4,000 mL by mouth As Directed. Take 2,000 mL the night before your procedure and 2,000 mL the morning of your procedure. 1 each 0      No current facility-administered medications for this encounter.       Allergies:   Allergies   Allergen Reactions    Lisinopril Coughing and UNKNOWN       Past Medical  History:    Allergic rhinitis    Anxiety    Asthma ([facility])    DVT (deep vein thrombosis) in pregnancy ([facility])    Encounter for therapeutic drug monitoring    Essential hypertension    Gestational diabetes ([facility])    High blood pressure    High myopia    History of IBS    History of PCOS    Hypertension    Infertility, female    Morbid obesity with BMI of 45.0-49.9, adult ([facility])    Obesity    PTSD (post-traumatic stress disorder)    Sleep apnea     Past Surgical History:   Procedure Laterality Date      2019, 2021    Colonoscopy  2018    Ivf package  2018    Other surgical history      egg retrieval for IVF     Family History   Problem Relation Age of Onset    Thyroid Disorder Father     Diabetes Father     Diabetes Mother     Hypertension Mother     No Known Problems Maternal Grandmother     No Known Problems Maternal Grandfather     Glaucoma Neg     Retinal detachment Neg     Macular degeneration Neg      Social History     Tobacco Use    Smoking status: Former    Smokeless tobacco: Never    Tobacco comments:     On and off   Substance Use Topics    Alcohol use: No         SYSTEM Check if Review is Normal Check if Physical Exam is Normal If not normal, please explain:   HEENT [X ] [ X]    NECK  [X ] [ X]    HEART [X ] [ X]    LUNGS [X ] [ X]    ABDOMEN [X ] [ X]    EXTREMITIES [X ] [ X]    OTHER        I have discussed the risks and benefits and alternatives of the procedure with the patient/family.  They understand and agree to proceed with plan of care.   I have reviewed the History and Physical done within the last 30 days.  Any changes noted above.    Olive Ny MD  [facility]

## 2024-05-02 NOTE — DISCHARGE INSTRUCTIONS
Home Care Instructions for Colonoscopy and/or Gastroscopy with Sedation    Diet:  - Resume your regular diet as tolerated unless otherwise instructed.  - Start with light meals to minimize bloating.  - Do not drink alcohol today.    Medication:  - If you have questions about resuming your normal medications, please contact your Primary Care Physician.    Activities:  - Take it easy today. Do not return to work today.  - Do not drive today.  - Do not operate any machinery today (including kitchen equipment).    Colonoscopy:  - You may notice some rectal \"spotting\" (a little blood on the toilet tissue) for a day or two after the exam. This is normal.  - If you experience any rectal bleeding (not spotting), persistent tenderness or sharp severe abdominal pains, oral temperature over 100 degrees Fahrenheit, light-headedness or dizziness, or any other problems, contact your doctor.    Gastroscopy:  - You may have a sore throat for 2-3 days following the exam. This is normal. Gargling with warm salt water (1/2 tsp salt to 1 glass warm water) or using throat lozenges will help.  - If you experience any sharp pain in your neck, abdomen or chest, vomiting of blood, oral temperature over 100 degrees Fahrenheit, light-headedness or dizziness, or any other problems, contact your doctor.    **If unable to reach your doctor, please go to the [facility]**    - Your referring physician will receive a full report of your examination.  - If you do not hear from your doctor's office within two weeks of your biopsy, please call them for your results.    You may be able to see your laboratory results in [facility] between 4 and 7 business days.  In some cases, your physician may not have viewed the results before they are released to [facility].  If you have questions regarding your results contact the physician who ordered the test/exam by phone or via [facility] by choosing \"Ask a Medical Question.\"

## 2024-05-03 DIAGNOSIS — K31.9 MUCOSAL ABNORMALITY OF DUODENUM: Primary | ICD-10-CM

## 2024-05-03 NOTE — PROGRESS NOTES
GI Staff:    Can you please place recall for this patient to have a colonoscopy in 3 years.    Thank you,  Olive

## 2024-05-06 ENCOUNTER — TELEPHONE (OUTPATIENT)
Facility: CLINIC | Age: 44
End: 2024-05-06

## 2024-05-06 NOTE — TELEPHONE ENCOUNTER
----- Message from Olive Ny MD sent at 5/3/2024  4:00 PM CDT -----  GI Staff:    Can you please place recall for this patient to have a colonoscopy in 3 years.    Thank you,  Olive

## 2024-05-06 NOTE — TELEPHONE ENCOUNTER
Recall colonoscopy in 3 years per Dr Ny.    Colon done 05/02/2024    Health maintenance updated and message sent to patient outreach to repeat colonoscopy in 3 years

## 2024-05-09 ENCOUNTER — TELEMEDICINE (OUTPATIENT)
Dept: SURGERY | Facility: CLINIC | Age: 44
End: 2024-05-09
Payer: COMMERCIAL

## 2024-05-09 VITALS — BODY MASS INDEX: 51.03 KG/M2 | WEIGHT: 288 LBS | HEIGHT: 63 IN

## 2024-05-09 DIAGNOSIS — E11.9 TYPE 2 DIABETES MELLITUS WITHOUT COMPLICATION, WITHOUT LONG-TERM CURRENT USE OF INSULIN (HCC): Primary | ICD-10-CM

## 2024-05-09 DIAGNOSIS — Z51.81 ENCOUNTER FOR THERAPEUTIC DRUG MONITORING: ICD-10-CM

## 2024-05-09 DIAGNOSIS — E66.01 MORBID OBESITY WITH BMI OF 50.0-59.9, ADULT (HCC): ICD-10-CM

## 2024-05-09 DIAGNOSIS — I10 HTN (HYPERTENSION), BENIGN: ICD-10-CM

## 2024-05-09 DIAGNOSIS — E28.2 PCOS (POLYCYSTIC OVARIAN SYNDROME): ICD-10-CM

## 2024-05-09 PROCEDURE — 99214 OFFICE O/P EST MOD 30 MIN: CPT | Performed by: INTERNAL MEDICINE

## 2024-05-09 NOTE — TELEPHONE ENCOUNTER
Spoke to patient  Name,  verified    I offered to send the hydrocortisone to the mail order pharmacy however patient states she is using the OTC version right now. I advised patient to call me if symptoms worsen and/or if she wants to try the Rx.    Dr. Schaefer    Patient's bariatric doctor, Dr. Jackson, is requiring GI clearance from you for bariatric surgery. If you haven't already, can you send him a message?    Thank you!

## 2024-05-09 NOTE — PROGRESS NOTES
[facility], [place]  [address]  [place] [address]  Dept: [phone]     Virtual video Check-In    Yareli Lynch verbally consents to a Virtual/Telephone Check-In visit on 24.  Patient has been referred to the [facility] website at [email or web address] to review the yearly Consent to Treat document.    Patient understands and accepts financial responsibility for any deductible, co-insurance and/or co-pays associated with this service.    Duration of the service: 22 minutes      Video visit    Patient:  Yarlei Lynch  :      3/23/1980  MRN:      [id number]    Chief Complaint:    Chief Complaint   Patient presents with    Follow - Up     Pre surgical weight loss       SUBJECTIVE     History of Present Illness:  Yareli is being seen today for a follow-up for non surgical weight loss    Past Medical History:   Past Medical History:    Allergic rhinitis    Anxiety    Asthma (HCC)    DVT (deep vein thrombosis) in pregnancy ([facility])    Encounter for therapeutic drug monitoring    Essential hypertension    Gestational diabetes (HCC)    High blood pressure    High myopia    History of IBS    History of PCOS    Hypertension    Infertility, female    Morbid obesity with BMI of 45.0-49.9, adult (HCC)    Obesity    PTSD (post-traumatic stress disorder)    Sleep apnea        Comorbidities:  KELECHI-Improvement?  yes and Snoring-Improvement?  yes    OBJECTIVE     Vitals: Ht 5' 3\" (1.6 m)   Wt 288 lb (130.6 kg)   LMP 2024   BMI 51.02 kg/m²     Initial weight loss: +1   Total weight loss: +18   Start weight: 270    Wt Readings from Last 3 Encounters:   24 288 lb (130.6 kg)   24 288 lb (130.6 kg)   24 288 lb (130.6 kg)       Patient Medications:    Current Outpatient Medications   Medication Sig Dispense Refill    semaglutide 4 MG/3ML Subcutaneous Solution Pen-injector Inject 1 mg into the skin once a week. 3 mL 3    hydrocortisone 25  MG Rectal Suppos Place 1 suppository (25 mg total) rectally 2 (two) times daily for 10 days. 20 suppository 0    albuterol 108 (90 Base) MCG/ACT Inhalation Aero Soln Inhale 1-2 puffs into the lungs every 4 to 6 hours as needed for Wheezing. 18 g 3    dilTIAZem  MG Oral Capsule SR 24 Hr Take 1 capsule (240 mg total) by mouth daily. 90 capsule 0    losartan 25 MG Oral Tab Take 1 tablet (25 mg total) by mouth daily. 90 tablet 0    ergocalciferol 1.25 MG (44134 UT) Oral Cap Take 1 capsule (50,000 Units total) by mouth once a week. (Patient not taking: Reported on 3/25/2024)      Ferrous Sulfate 325 (65 Fe) MG Oral Tab Take 1 tablet (325 mg total) by mouth daily.      montelukast 10 MG Oral Tab Take 1 tablet (10 mg total) by mouth nightly.      hydroCHLOROthiazide 25 MG Oral Tab Take 1 tablet (25 mg total) by mouth daily. 90 tablet 0    albuterol (2.5 MG/3ML) 0.083% Inhalation Nebu Soln Take 3 mL (2.5 mg total) by nebulization every 4 (four) hours as needed for Wheezing or Shortness of Breath. 120 mL 0    Budesonide-Formoterol Fumarate (SYMBICORT) 160-4.5 MCG/ACT Inhalation Aerosol Inhale 2 puffs into the lungs 2 (two) times daily. Rinse mouth, gargle, and spit to follow. 1 each 0     Allergies:  Lisinopril     Social History:    Social History     Socioeconomic History    Marital status:      Spouse name: Not on file    Number of children: Not on file    Years of education: Not on file    Highest education level: Not on file   Occupational History    Occupation: health    Tobacco Use    Smoking status: Former    Smokeless tobacco: Never    Tobacco comments:     On and off   Vaping Use    Vaping status: Never Used   Substance and Sexual Activity    Alcohol use: No    Drug use: No    Sexual activity: Yes     Partners: Female     Comment: with wife    Other Topics Concern     Service Not Asked    Blood Transfusions No    Caffeine Concern Not Asked    Occupational Exposure Not Asked    Hobby  Hazards Not Asked    Sleep Concern Not Asked    Stress Concern Not Asked    Weight Concern Not Asked    Special Diet Not Asked    Back Care Not Asked    Exercise Not Asked    Bike Helmet Not Asked    Seat Belt Not Asked    Self-Exams Not Asked   Social History Narrative    No H/O abuse     Lives with wife Hallie     Social Determinants of Health     Financial Resource Strain: Low Risk  (1/18/2024)    Financial Resource Strain     Difficulty of Paying Living Expenses: Not on file     Med Affordability: No   Food Insecurity: No Food Insecurity (1/14/2024)    Food Insecurity     Food Insecurity: Never true   Transportation Needs: No Transportation Needs (1/14/2024)    Transportation Needs     Lack of Transportation: No   Physical Activity: Inactive (2/11/2020)    [facility]    Exercise Vital Sign     Days of Exercise per Week: 0 days     Minutes of Exercise per Session: 0 min   Stress: No Stress Concern Present (2/11/2020)    [facility]    [employer] - Occupational Stress Questionnaire     Feeling of Stress : Not at all   Social Connections: Somewhat Isolated (2/11/2020)    [facility]    Social Connection and Isolation Panel [NHANES]     Frequency of Communication with Friends and Family: More than three times a week     Frequency of Social Gatherings with Friends and Family: More than three times a week     Attends [employer] Services: Never     Active Member of Clubs or Organizations: No     Attends Club or Organization Meetings: Never     Marital Status:    Housing Stability: Low Risk  (1/14/2024)    Housing Stability     Housing Instability: No     Housing Instability Emergency: Not on file     Crib or Bassinette: Not on file     Surgical History:    Past Surgical History:    Procedure Laterality Date      2019, 2021    Colonoscopy  2018    Colonoscopy  2024    ; colon polyps, hemorrhoids    Colonoscopy N/A 2024    Procedure: COLONOSCOPY;  Surgeon: Olive Ny MD;  [facility] ENDOSCOPY    Egd  2024    ; gastric polyps    Ivf package  2018    Other surgical history      egg retrieval for IVF     Family History:    Family History   Problem Relation Age of Onset    Thyroid Disorder Father     Diabetes Father     Diabetes Mother     Hypertension Mother     No Known Problems Maternal Grandmother     No Known Problems Maternal Grandfather     Glaucoma Neg     Retinal detachment Neg     Macular degeneration Neg        Food Journal  Reviewed and Discussed:       Patient has a Food Journal?: yes   Patient is reading nutrition labels?  yes  Average Caloric Intake:     Average CHO Intake: 150  Is patient exercising? no  Type of exercise?     Eating Habits  Patient states the following:  Eats 3 meal(s) per day  Length of time it takes to consume a meal:  20  # of snacks per day: 1 Type of snacks:  chocolate  Amount of soda consumption per day:  regular  Amount of water (in ounces) per day:  64  Drinking between meals only:  yes  Toughest challenge:  soda    Nutritional Goals  Limit carbohydrates to 100 gms per day, Eat 100-200 calories within 1 hour of waking  and Eat 3-4 cups of fresh fruits or vegetables daily    Behavior Modifications Reviewed and Discussed  Eat breakfast, Eat 3 meals per day, Plan meals in advance, Read nutrition labels, Drink 64 oz of water per day, Maintain a daily food journal, No drinking 30 minutes before or after meals, Utlize portion control strategies to reduce calorie intake, Identify triggers for eating and manage cues and Eat slowly and take 20 to 30 minutes to complete each meal    Exercise Goals Reviewed and Discussed        ROS:    Constitutional: positive for  fatigue  Respiratory: negative  Cardiovascular: negative  Gastrointestinal: positive for constipation  Musculoskeletal:positive for arthralgias and back pain  Neurological: positive for headaches  Behavioral/Psych: positive for stress  Endocrine: negative  All other systems were reviewed and are negative    Physical Exam:   General appearance: alert, appears stated age, cooperative and moderately obese  Head: Normocephalic, without obvious abnormality, atraumatic  Back: symmetric, no curvature. ROM normal. No CVA tenderness.  Lungs: clear to auscultation bilaterally  Heart: S1, S2 normal, no murmur, click, rub or gallop, regular rate and rhythm  Abdomen:  soft, obese, non tender  Extremities: extremities normal, atraumatic, no cyanosis or edema  Pulses: 2+ and symmetric  Skin: Skin color, texture, turgor normal. No rashes or lesions  Neurologic: Grossly normal    ASSESSMENT     HYPERTENSION:  The patient's blood pressure has been well controlled.  she has been checking it as instructed and has remained in relatively good control.    Encounter Diagnosis(ses):   Encounter Diagnoses   Name Primary?    Type 2 diabetes mellitus without complication, without long-term current use of insulin (HCC) Yes    HTN (hypertension), benign [I10]     PCOS (polycystic ovarian syndrome) [E28.2]     Encounter for therapeutic drug monitoring     Morbid obesity with BMI of 50.0-59.9, adult (HCC)        PLAN     Discussed with patient the risks and benefits of  VSG. The patient is interested in bariatric surgery and has begun our presurgical process.     HYPERTENSION: Blood pressure stable on the above medications. No interval change in antihypertensive medication.     OBSTRUCTIVE SLEEP APNEA: Given the patient's history suggestive of obstructive sleep apnea as outlined above, consideration for obtaining a sleep study may be warranted.  Further consideration for obtaining the sleep study will be discussed with the patient's  PCP.    DIABETES: Continue current medications.         Goals for next month:  1. Keep a food log.  2. Drink 48-64 ounces of non-caloric beverages per day. No fruit juices or regular soda.  3. Increase activity-upper body exercises, walk 10 minutes per day.  4. Increase fruit and vegetable servings to 5-6 per day.      Attended seminar    Met with Dr Rueda (needs follow up)  Met with the RD  Met with cardiology (stress test, EKG, echo done)  Met with pulmonary (PFT done, cxr done, +YAMILE)  Met with psych (needs clearance)    Blood work done  ekg done    Taking vitamin D weekly    Will go over pre and post op meds once she gets a surgery date    Tolerating OZEMPIC.    Diagnoses and all orders for this visit:    Type 2 diabetes mellitus without complication, without long-term current use of insulin ([facility])  -     semaglutide 4 MG/3ML Subcutaneous Solution Pen-injector; Inject 1 mg into the skin once a week.    HTN (hypertension), benign [I10]    PCOS (polycystic ovarian syndrome) [E28.2]    Encounter for therapeutic drug monitoring    Morbid obesity with BMI of 50.0-59.9, adult (HCC)          Neftali Jackson MD

## 2024-05-13 DIAGNOSIS — E66.01 MORBID OBESITY WITH BMI OF 50.0-59.9, ADULT (HCC): Primary | ICD-10-CM

## 2024-05-22 ENCOUNTER — TELEMEDICINE (OUTPATIENT)
Dept: SURGERY | Facility: CLINIC | Age: 44
End: 2024-05-22

## 2024-05-22 DIAGNOSIS — E66.01 MORBID OBESITY WITH BMI OF 50.0-59.9, ADULT (HCC): Primary | ICD-10-CM

## 2024-05-22 DIAGNOSIS — I10 HTN (HYPERTENSION), BENIGN: ICD-10-CM

## 2024-05-22 DIAGNOSIS — E11.9 TYPE 2 DIABETES MELLITUS WITHOUT COMPLICATION, WITHOUT LONG-TERM CURRENT USE OF INSULIN (HCC): ICD-10-CM

## 2024-05-22 NOTE — PROGRESS NOTES
[facility]  [address]  [place] [address]  Dept: [phone]  Loc: [phone]  Yareli Lynch verbally consents to a telemedicine service with live, interactive video and audio on 5/22/2024.  Patient understands and accepts financial responsibility for any deductible, co-insurance and/or co-pays associated with this service.   05/22/24      Bariatric Follow-up Nutrition Session    Yareli Lynch is a 44 year old female.     Assessment     Procedure:  Vertical Sleeve Gastrectomy  Here for medical weight management: yes  Revision:  n/a  Surgery Date:  tbd  Medical Diagnosis:  obesity grade III, PCOS,KELECHI, YAMILE waiting for Cpap    Labs:  Lab Results   Component Value Date     (H) 03/25/2024    BUN 12 03/25/2024    BUNCREA 15.8 03/25/2024    CREATSERUM 0.76 03/25/2024    ANIONGAP 5 03/25/2024    GFRNAA 86 02/28/2022    GFRAA 99 02/28/2022    CA 8.9 03/25/2024    OSMOCALC 296 (H) 03/25/2024    ALKPHO 66 09/07/2023    AST 13 (L) 09/07/2023    ALT 19 09/07/2023    BILT 0.2 09/07/2023    TP 7.3 09/07/2023    ALB 3.3 (L) 09/07/2023    GLOBULIN 4.0 09/07/2023     03/25/2024    K 3.4 (L) 03/25/2024     03/25/2024    CO2 30.0 03/25/2024     No results found for: \"MG\"   No results found for: \"PHOS\"    Thyroid:    Lab Results   Component Value Date    TSH 1.360 09/07/2023    TSH 1.010 09/22/2021       Iron Panel:  Lab Results   Component Value Date    IRON 31 (L) 09/07/2023    TRANSFERRIN 359 09/07/2023    TIBCP 535 (H) 09/07/2023    SAT 6 (L) 09/07/2023       CBC:  Lab Results   Component Value Date    WBC 13.0 (H) 01/17/2024    WBC 16.2 (H) 01/16/2024    WBC 19.2 (H) 01/15/2024     Lab Results   Component Value Date    HGB 10.8 (L) 01/17/2024    HGB 11.6 (L) 01/16/2024    HGB 11.9 (L) 01/15/2024      Lab Results   Component Value Date    .0 01/17/2024    .0 01/16/2024    .0 01/15/2024       Diabetes:    Lab Results    Component Value Date     (H) 01/15/2024    A1C 6.1 (H) 01/15/2024       Lipid Panel:  Lab Results   Component Value Date    CHOLEST 160 01/14/2024    TRIG 77 01/14/2024    HDL 49 01/14/2024    LDL 96 01/14/2024    VLDL 13 01/14/2024    NONHDLC 111 01/14/2024        Vitamins/Minerals:  Lab Results   Component Value Date    B12 377 09/07/2023     Lab Results   Component Value Date    VITD 22.2 (L) 09/07/2023     No results found for: \"THIAMINE\"   Lab Results   Component Value Date/Time    VITB1 96.2 09/07/2023 03:17 PM     Lab Results   Component Value Date/Time    FOLIC 14.2 09/07/2023 03:17 PM        Meds:     Current Outpatient Medications:     semaglutide 4 MG/3ML Subcutaneous Solution Pen-injector, Inject 1 mg into the skin once a week., Disp: 3 mL, Rfl: 3    albuterol 108 (90 Base) MCG/ACT Inhalation Aero Soln, Inhale 1-2 puffs into the lungs every 4 to 6 hours as needed for Wheezing., Disp: 18 g, Rfl: 3    dilTIAZem  MG Oral Capsule SR 24 Hr, Take 1 capsule (240 mg total) by mouth daily., Disp: 90 capsule, Rfl: 0    losartan 25 MG Oral Tab, Take 1 tablet (25 mg total) by mouth daily., Disp: 90 tablet, Rfl: 0    ergocalciferol 1.25 MG (45473 UT) Oral Cap, Take 1 capsule (50,000 Units total) by mouth once a week. (Patient not taking: Reported on 3/25/2024), Disp: , Rfl:     Ferrous Sulfate 325 (65 Fe) MG Oral Tab, Take 1 tablet (325 mg total) by mouth daily., Disp: , Rfl:     montelukast 10 MG Oral Tab, Take 1 tablet (10 mg total) by mouth nightly., Disp: , Rfl:     hydroCHLOROthiazide 25 MG Oral Tab, Take 1 tablet (25 mg total) by mouth daily., Disp: 90 tablet, Rfl: 0    albuterol (2.5 MG/3ML) 0.083% Inhalation Nebu Soln, Take 3 mL (2.5 mg total) by nebulization every 4 (four) hours as needed for Wheezing or Shortness of Breath., Disp: 120 mL, Rfl: 0    Budesonide-Formoterol Fumarate (SYMBICORT) 160-4.5 MCG/ACT Inhalation Aerosol, Inhale 2 puffs into the lungs 2 (two) times daily. Rinse mouth,  gargle, and spit to follow., Disp: 1 each, Rfl: 0    Height:    Ht Readings from Last 1 Encounters:   05/09/24 5' 3\" (1.6 m)     Weight:    Wt Readings from Last 6 Encounters:   05/09/24 288 lb (130.6 kg)   05/01/24 288 lb (130.6 kg)   03/25/24 288 lb (130.6 kg)   01/30/24 287 lb 9.6 oz (130.5 kg)   01/19/24 285 lb 4 oz (129.4 kg)   01/17/24 276 lb 6.4 oz (125.4 kg)       Weight change:  no recent weight since hospitalization  Post-Op Excess Body Weight Loss: n/a% EBWL  BMI: There is no height or weight on file to calculate BMI.    Protein Intake: 114 grams/day  Carbs: 202, 43, 118, 105, 71,  Fluid intake:  16 ounces/day    Diet history:       Breakfast      AM Snack       Lunch     PM Snack     Dinner   Overnight oats and 1 cup coffee Pistachios or cashews handful or craisins with peanuts  Protein shake with blueberries and oat milk Reeses cup or plums or cubed cheese or after dinner:  Special K with dried strawberries with milk 2%  Kale salad from [employer] with cheese and hb eggs or rice wild and steak and half pork chop               Total Calories:  ~9273-8389  Excessive in: nothing  Inadequate in:  fruits, vegetables, and fiber     Patient has made some modifications and adjustments to diet: yes,  is eating smaller portions, is eating more vegetables, is drinking more water, is continuing to tapering soda-is drinking only 1 cans per day- used to be 3-5 cans per day, is eating more slowly at dinner meal, is chewing more before swallowing, is eating separately from drinking-admits to needing to practice waiting 30 minutes, is eating 5 times per day-used to be 3 times, is eating 1 serving of vegetables per day, is eating 1 serving of fruit every other day.    Food intolerances:  dairy- diarrhea and bloating   Vitamin/mineral supplements:  Other: taking intermittently and Vit D  Protein supplements:  Other no      Activity Level: moderate  Type: walk  Duration: 20 minutes  Frequency: per day    Other:    Met with pt  for pre-op visit.  Per pt is taking increased dose of ozempic and feels it still helps with feeling phillips faster. Pt continues to pursue VSG with Dr. Rueda.  Per pt had been hospitalized with bacterial infection in lungs and has been sick with adult acquired asthma for the past three months.  Per pt has been keeping a food record and is consuming  3446-7940 calories, 114 gm protein and 107 gm carbs per day.  Per pt  is eating smaller portions, is eating more vegetables, is drinking more water, is continuing to tapering soda-is drinking only 1 cans per day- used to be 3-5 cans per day, is eating more slowly at dinner meal, is chewing more before swallowing, is eating separately from drinking-admits to needing to practice waiting 30 minutes, is eating 5 times per day-used to be 3 times, is eating 1 serving of vegetables per day, is eating 1 serving of fruit every other day.  Discussed pt need to continue to practice eating separately from drinking.  Pt verbalized understanding.  Per pt is consuming ~16 oz of fluids per day.  Pt encouraged to continue increasing water consumed. Discussed strategies.  Pt verbalized understanding.  Per pt is exercising by walking 20 minutes per day.  Per pt is not currently doing any formal strength training, but has been more mindful.  Pt congratulated for lifestyle changes made to date.  Per pt with scheduled pre-op visit next month.      Nutrition Diagnosis     Nutrition Diagnosis: Morbid obesity: Improvement shown     Intervention     Recommendations/goals:    1.  Continue to keep a food record, My Net Diary, select the macros dashboard.  2.  Strive to consume at least 4-6 meals/snacks per day.  Include protein and produce when you eat.  Aim for 120-130 grams of protein per day.  Try to keep the carbohydrates at 110-120 grams per day or less.    3.  Continue to practice eating strategies, eat separately from drinking, avoid straws, chew food 20-30 times before swallowing.  Make the  meals last 30 minutes.  4.  Aim for 64 oz per day of water.  (Try protein water, adding True Lemon, Crystal Light).  5.  Continue to taper caffeine and carbonation.  6.  Continue to exercise with a goal of 30 minutes per day for exercise (for example,walking).    7.  Add strength training 10 minutes 3 days per week.   (7 minute workout song) or (Gym Rat no more-18 at home exercises) or Nike 10 minute workout on [employer]'s. Focus on on right arm and left leg.  8.  Do the quizzes in the binder on pages 18-24 for review at next visit.           Monitor/Evaluate     Anthropometric measurements, Food/fluid intake/choices, Food intolerances, Activity level, Vitamin/mineral supplementation, Reinforce goals, and Calorie/protein intake  Additional RD visits required to review concepts? yes  Patient understands protein requirements? yes  Patient understand fluid requirements (amount and method of intake)? yes  Patient understands post-operative diet? yes  Patient keeping consistent food records? yes  Patient ready for Liquid Protein Education? No, needs to practice drinking at least 64 oz of water per day, needs to practice waiting after eating to drink       Alda Parra, MARTA, LDN    Face-to-face time spent with pt: 30 minutes

## 2024-05-22 NOTE — PATIENT INSTRUCTIONS
Recommendations/goals:    1.  Continue to keep a food record, My Net Diary, select the macros dashboard.  2.  Strive to consume at least 4-6 meals/snacks per day.  Include protein and produce when you eat.  Aim for 120-130 grams of protein per day.  Try to keep the carbohydrates at 110-120 grams per day or less.    3.  Continue to practice eating strategies, eat separately from drinking, avoid straws, chew food 20-30 times before swallowing.  Make the meals last 30 minutes.  4.  Aim for 64 oz per day of water.  (Try protein water, adding True Lemon, Crystal Light).  5.  Continue to taper caffeine and carbonation.  6.  Continue to exercise with a goal of 30 minutes per day for exercise (for example,walking).    7.  Add strength training 10 minutes 3 days per week.   (7 minute workout song) or (Gym Rat no more-18 at home exercises) or Nike 10 minute workout on [employer]. Focus on on right arm and left leg.  8.  Do the quizzes in the binder on pages 18-24 for review at next visit.

## 2024-05-29 ENCOUNTER — OFFICE VISIT (OUTPATIENT)
Dept: SURGERY | Facility: CLINIC | Age: 44
End: 2024-05-29

## 2024-05-29 ENCOUNTER — TELEPHONE (OUTPATIENT)
Facility: CLINIC | Age: 44
End: 2024-05-29

## 2024-05-29 ENCOUNTER — DOCUMENTATION ONLY (OUTPATIENT)
Dept: SURGERY | Facility: CLINIC | Age: 44
End: 2024-05-29

## 2024-05-29 VITALS
WEIGHT: 284 LBS | SYSTOLIC BLOOD PRESSURE: 128 MMHG | HEIGHT: 63 IN | BODY MASS INDEX: 50.32 KG/M2 | HEART RATE: 78 BPM | OXYGEN SATURATION: 98 % | DIASTOLIC BLOOD PRESSURE: 80 MMHG

## 2024-05-29 NOTE — TELEPHONE ENCOUNTER
1st Attempt: 5/29/2024    Left Voice Mail To Call Back     Outreach patient to help schedule Mammogram screening.     Last visit: [facility]

## 2024-05-29 NOTE — PROGRESS NOTES
[facility], [facility], [place]  [address]  [facility] [address]  Dept: [phone]    2024   Bariatric Surgery Patient Evaluation    Chief Complaint:  morbid obesity     History of Present Illness:  Yareli Lynch is a 44 year old female presenting for surgical follow-up.  She was last seen in 2023, sites severe pulmonary illness as the reason for not following up until now.  She apparently required 2 months of steroids.  She gained a lot of weight, recently lost 5 pounds which puts her exactly at where she was when we first met.    Past Medical History:    Past Medical History:    Allergic rhinitis    Anxiety    Asthma ([facility])    DVT (deep vein thrombosis) in pregnancy ([facility])    Encounter for therapeutic drug monitoring    Essential hypertension    Gestational diabetes ([facility])    High blood pressure    High myopia    History of IBS    History of PCOS    Hypertension    Infertility, female    Morbid obesity with BMI of 45.0-49.9, adult ([facility])    Obesity    PTSD (post-traumatic stress disorder)    Sleep apnea       Past Surgical History:    Past Surgical History:   Procedure Laterality Date      2019, 2021    Colonoscopy  2018    Colonoscopy  2024    ; colon polyps, hemorrhoids    Colonoscopy N/A 2024    Procedure: COLONOSCOPY;  Surgeon: Olive Ny MD;  Location: [facility] ENDOSCOPY    Egd  2024    ; gastric polyps    Ivf package  2018    Other surgical history      egg retrieval for IVF       Family History:    Family History   Problem Relation Age of Onset    Thyroid Disorder Father     Diabetes Father     Diabetes Mother     Hypertension Mother     No Known Problems Maternal Grandmother     No Known Problems Maternal Grandfather     Glaucoma Neg     Retinal detachment Neg     Macular degeneration Neg        Social History:    Social History     Socioeconomic History     Marital status:    Occupational History    Occupation: health    Tobacco Use    Smoking status: Former    Smokeless tobacco: Never    Tobacco comments:     On and off   Vaping Use    Vaping status: Never Used   Substance and Sexual Activity    Alcohol use: No    Drug use: No    Sexual activity: Yes     Partners: Female     Comment: with wife    Other Topics Concern    Blood Transfusions No       Medications:   Current Outpatient Medications:     semaglutide 4 MG/3ML Subcutaneous Solution Pen-injector, Inject 1 mg into the skin once a week., Disp: 3 mL, Rfl: 3    albuterol 108 (90 Base) MCG/ACT Inhalation Aero Soln, Inhale 1-2 puffs into the lungs every 4 to 6 hours as needed for Wheezing., Disp: 18 g, Rfl: 3    dilTIAZem  MG Oral Capsule SR 24 Hr, Take 1 capsule (240 mg total) by mouth daily., Disp: 90 capsule, Rfl: 0    losartan 25 MG Oral Tab, Take 1 tablet (25 mg total) by mouth daily., Disp: 90 tablet, Rfl: 0    ergocalciferol 1.25 MG (22635 UT) Oral Cap, Take 1 capsule (50,000 Units total) by mouth once a week. (Patient not taking: Reported on 3/25/2024), Disp: , Rfl:     Ferrous Sulfate 325 (65 Fe) MG Oral Tab, Take 1 tablet (325 mg total) by mouth daily., Disp: , Rfl:     montelukast 10 MG Oral Tab, Take 1 tablet (10 mg total) by mouth nightly., Disp: , Rfl:     hydroCHLOROthiazide 25 MG Oral Tab, Take 1 tablet (25 mg total) by mouth daily., Disp: 90 tablet, Rfl: 0    albuterol (2.5 MG/3ML) 0.083% Inhalation Nebu Soln, Take 3 mL (2.5 mg total) by nebulization every 4 (four) hours as needed for Wheezing or Shortness of Breath., Disp: 120 mL, Rfl: 0    Budesonide-Formoterol Fumarate (SYMBICORT) 160-4.5 MCG/ACT Inhalation Aerosol, Inhale 2 puffs into the lungs 2 (two) times daily. Rinse mouth, gargle, and spit to follow., Disp: 1 each, Rfl: 0     Allergies:    Allergies   Allergen Reactions    Lisinopril Coughing and UNKNOWN       ROS:      Constitutional: negative  HEENT:  negative  Respiratory: negative  Cardiovascular: negative  Gastrointestinal: negative  Genitourinary: negative  Musculoskeletal: negative  Neurological: negative  Psychological: negative  Endocrine: negative  Immunologic: negative  Integumentary: negative      Physical Exam:  Vital Signs:  /80   Pulse 78   Ht 5' 3\" (1.6 m)   Wt 284 lb (128.8 kg)   LMP 2024   SpO2 98%   BMI 50.31 kg/m²   BMI:  Body mass index is 50.31 kg/m².  General: no acute distress, well-nourished  HENT: normocephalic, atraumatic  Lung: breathing comfortably, symmetrical expansion, no wheezing  Heart: regular rate and rhythm  Abdomen: soft, obese, non-tender, non-distended, no hernia/mass/organomegaly, well-healed Pfannenstiel  Extremities: normal strength, normal ROM, walks without assist device  Neuro: no focal deficits, cranial nerves grossly intact  Psych: alert and oriented, normal affect  Skin: warm, dry    Assessment: 44 year old female with chronic morbid obesity who would benefit from significant and sustained weight loss.  EGD was negative for any signs of esophagitis or Sidhu's.  There was no hiatal hernia.  Although it has been nearly a year since her initial consultation this is only our second visit.  Her decision for sleeve gastrectomy is the better of 2 options in my opinion given her potential need for future steroids and relatively clean EGD.    Dietitian - further visits needed  Cardiology - has follow-up in   Pulmonology - cleared Mar 2024  Psych - cleared 2023  GI - EGD/colonoscopy reviewed  Bariatrician -Dr. Jackson  Labs - low vit D and iron, supp rx    Plan: The patient appears to be a reasonable candidate for bariatric surgery.  She is in the process of completing her preoperative evaluations.  We discussed that clearances  after a year.  Baseline laboratory studies have been checked.  Ultrasound ordered to assess for gallstones was negative.  The patient is following with Dr. Jackson for  medically supervised weight loss.  I will see the patient again in 4 weeks to monitor their progress.  Although the patient is most interested in sleeve gastrectomy, a final decision regarding the choice of surgery will not be made until the above clearances and evaluations are complete.    Jarek Rueda MD, 05/29/24, 9:42 AM

## 2024-06-07 ENCOUNTER — DOCUMENTATION ONLY (OUTPATIENT)
Dept: SURGERY | Facility: CLINIC | Age: 44
End: 2024-06-07

## 2024-06-18 ENCOUNTER — TELEPHONE (OUTPATIENT)
Dept: SURGERY | Facility: CLINIC | Age: 44
End: 2024-06-18

## 2024-06-18 NOTE — TELEPHONE ENCOUNTER
LVM message for pt regarding writer joining video call at 10:14 am today. Left message that another link was sent to patient to see if able to join.

## 2024-06-19 ENCOUNTER — TELEPHONE (OUTPATIENT)
Facility: CLINIC | Age: 44
End: 2024-06-19

## 2024-06-19 DIAGNOSIS — E11.9 TYPE 2 DIABETES MELLITUS WITHOUT COMPLICATION, WITHOUT LONG-TERM CURRENT USE OF INSULIN (HCC): Primary | ICD-10-CM

## 2024-06-20 ENCOUNTER — TELEMEDICINE (OUTPATIENT)
Dept: INTERNAL MEDICINE CLINIC | Facility: CLINIC | Age: 44
End: 2024-06-20

## 2024-06-20 DIAGNOSIS — I10 HTN (HYPERTENSION), BENIGN: ICD-10-CM

## 2024-06-20 DIAGNOSIS — E66.01 MORBID OBESITY WITH BMI OF 50.0-59.9, ADULT (HCC): Primary | ICD-10-CM

## 2024-06-20 DIAGNOSIS — E11.9 TYPE 2 DIABETES MELLITUS WITHOUT COMPLICATION, WITHOUT LONG-TERM CURRENT USE OF INSULIN (HCC): ICD-10-CM

## 2024-06-20 NOTE — PROGRESS NOTES
[facility] BARIATRIC AND WEIGHT LOSS CLINIC  [address]  [facility] [address]  Dept: [phone]  Loc: [phone]  Yareli Lynch verbally consents to a telemedicine service with live, interactive video and audio on 6/20/2024.  Patient understands and accepts financial responsibility for any deductible, co-insurance and/or co-pays associated with this service.    06/20/24      Bariatric Follow-up Nutrition Session    Yareli Lynch is a 44 year old female.     Assessment     Procedure:  Vertical Sleeve Gastrectomy  Here for medical weight management: yes  Revision:  n/a  Surgery Date:  tbd  Medical Diagnosis:  obesity grade III, PCOS, KELECHI, YAMILE waiting for Cpap    Labs:  Lab Results   Component Value Date     (H) 03/25/2024    BUN 12 03/25/2024    BUNCREA 15.8 03/25/2024    CREATSERUM 0.76 03/25/2024    ANIONGAP 5 03/25/2024    GFRNAA 86 02/28/2022    GFRAA 99 02/28/2022    CA 8.9 03/25/2024    OSMOCALC 296 (H) 03/25/2024    ALKPHO 66 09/07/2023    AST 13 (L) 09/07/2023    ALT 19 09/07/2023    BILT 0.2 09/07/2023    TP 7.3 09/07/2023    ALB 3.3 (L) 09/07/2023    GLOBULIN 4.0 09/07/2023     03/25/2024    K 3.4 (L) 03/25/2024     03/25/2024    CO2 30.0 03/25/2024     No results found for: \"MG\"   No results found for: \"PHOS\"    Thyroid:    Lab Results   Component Value Date    TSH 1.360 09/07/2023    TSH 1.010 09/22/2021       Iron Panel:  Lab Results   Component Value Date    IRON 31 (L) 09/07/2023    TRANSFERRIN 359 09/07/2023    TIBCP 535 (H) 09/07/2023    SAT 6 (L) 09/07/2023       CBC:  Lab Results   Component Value Date    WBC 13.0 (H) 01/17/2024    WBC 16.2 (H) 01/16/2024    WBC 19.2 (H) 01/15/2024     Lab Results   Component Value Date    HGB 10.8 (L) 01/17/2024    HGB 11.6 (L) 01/16/2024    HGB 11.9 (L) 01/15/2024      Lab Results   Component Value Date    .0 01/17/2024    .0 01/16/2024    .0 01/15/2024       Diabetes:    Lab Results    Component Value Date     (H) 01/15/2024    A1C 6.1 (H) 01/15/2024       Lipid Panel:  Lab Results   Component Value Date    CHOLEST 160 01/14/2024    TRIG 77 01/14/2024    HDL 49 01/14/2024    LDL 96 01/14/2024    VLDL 13 01/14/2024    NONHDLC 111 01/14/2024        Vitamins/Minerals:  Lab Results   Component Value Date    B12 377 09/07/2023     Lab Results   Component Value Date    VITD 22.2 (L) 09/07/2023     No results found for: \"THIAMINE\"   Lab Results   Component Value Date/Time    VITB1 96.2 09/07/2023 03:17 PM     Lab Results   Component Value Date/Time    FOLIC 14.2 09/07/2023 03:17 PM        Meds:     Current Outpatient Medications:     semaglutide 4 MG/3ML Subcutaneous Solution Pen-injector, Inject 1 mg into the skin once a week., Disp: 3 mL, Rfl: 3    albuterol 108 (90 Base) MCG/ACT Inhalation Aero Soln, Inhale 1-2 puffs into the lungs every 4 to 6 hours as needed for Wheezing., Disp: 18 g, Rfl: 3    dilTIAZem  MG Oral Capsule SR 24 Hr, Take 1 capsule (240 mg total) by mouth daily., Disp: 90 capsule, Rfl: 0    losartan 25 MG Oral Tab, Take 1 tablet (25 mg total) by mouth daily., Disp: 90 tablet, Rfl: 0    ergocalciferol 1.25 MG (81028 UT) Oral Cap, Take 1 capsule (50,000 Units total) by mouth once a week. (Patient not taking: Reported on 3/25/2024), Disp: , Rfl:     Ferrous Sulfate 325 (65 Fe) MG Oral Tab, Take 1 tablet (325 mg total) by mouth daily., Disp: , Rfl:     montelukast 10 MG Oral Tab, Take 1 tablet (10 mg total) by mouth nightly., Disp: , Rfl:     hydroCHLOROthiazide 25 MG Oral Tab, Take 1 tablet (25 mg total) by mouth daily., Disp: 90 tablet, Rfl: 0    albuterol (2.5 MG/3ML) 0.083% Inhalation Nebu Soln, Take 3 mL (2.5 mg total) by nebulization every 4 (four) hours as needed for Wheezing or Shortness of Breath., Disp: 120 mL, Rfl: 0    Budesonide-Formoterol Fumarate (SYMBICORT) 160-4.5 MCG/ACT Inhalation Aerosol, Inhale 2 puffs into the lungs 2 (two) times daily. Rinse mouth,  gargle, and spit to follow., Disp: 1 each, Rfl: 0    Height:    Ht Readings from Last 1 Encounters:   05/29/24 5' 3\" (1.6 m)     Weight:    Wt Readings from Last 6 Encounters:   05/29/24 284 lb (128.8 kg)   05/09/24 288 lb (130.6 kg)   05/01/24 288 lb (130.6 kg)   03/25/24 288 lb (130.6 kg)   01/30/24 287 lb 9.6 oz (130.5 kg)   01/19/24 285 lb 4 oz (129.4 kg)       Weight change:  down 3 lbs since LOV 5/22/24, per pt weighed 285 lbs a few days ago when at doctors office  Post-Op Excess Body Weight Loss: n/a% EBWL  BMI: There is no height or weight on file to calculate BMI.    Protein Intake: 76 grams/day   Carbs: 68 gm/day  Fluid intake:  64 ounces/day    Diet history:       Breakfast      AM Snack       Lunch     PM Snack     Dinner   Overnight oats and 1 cup half caf coffee with stevia in raw and non dairy creamer Pistachios or cashews handful or craisins with peanuts  Protein shake with blueberries and oat milk or sweet kale salad with 2 hb eggs Skips or greek yogurt with granola Chicken or pork chops with potatoes or Haitian chicken air fried or chick pea pasta or frozen veggies broccoli spinach or peas or Kale salad from [employer] with cheese and hb eggs or rice wild and steak and half pork chop                Total Calories:  ~4117-0167  Excessive in: nothing  Inadequate in:  fruits, vegetables, and fiber     Patient has made some modifications and adjustments to diet: yes, is eating smaller portions, is eating more vegetables-now at 1-2 servings per day, is drinking more water, is continuing to tapering soda-last time had soda was 4 days ago- used to be 3-5 cans per day, is eating more slowly at dinner meal, is chewing more before swallowing, is eating separately from drinking-is waiting 30 minutes after eating to drink, is eating 5-6 times per day-used to be 3 times, is eating 1 serving of fruit every day during the week.   Food intolerances:  dairy- diarrhea and bloating   Vitamin/mineral supplements:  Iron and  Other: Prenatal gummie  waiting for another prescription for vit D  Protein supplements:  Premier Protein     Activity Level: moderate  Type: walk and hand weights 2 days per week  Duration: 30-40 minutes  Frequency: per day    Other:  Met with pt for virtual pre-op visit.  Per pt is continuing to take ozempic increased to 1.0 mg and feels it still helps with feeling phillips faster and is helping her to eat less. Pt continues to pursue VSG with Dr. Rueda.   Per pt has been keeping a food record and is consuming  and average of ~9103-2669 calories, 76 gm protein and 68 gm carbs per day.  Per pt is eating smaller portions, is eating more vegetables-now at 1-2 servings per day, is drinking more water, is continuing to tapering soda-last time had soda was four days ago- used to be 3-5 cans per day, is eating more slowly at dinner meal, is chewing more before swallowing, is eating separately from drinking-is waiting 30 minutes after eating to drink, is eating 5-6 times per day-used to be 3 times, is eating 1 serving of fruit every day during the week.   Discussed ideas for continuing to increase protein with meals/snacks.  Provided ideas.  Pt verbalized understanding.  Per pt is consuming more water is now drinking 64 oz of fluids per day.  Per pt using a measured water container was  a game changer for meeting fluid needs.  Per pt is continuing to increase amount of exercise er day is walking 30-40 minutes per day.  Per pt is strength training by using hand weights twice per week.  Pt congratulated for making lifestyle changes.  Pt is ready for LPD instruction which is scheduled for next week.      Nutrition Diagnosis     Nutrition Diagnosis: Morbid obesity: Improvement shown     Intervention     Recommendations/goals:    1.  Continue to keep a food record, My Net Diary, select the macros dashboard.  2.  Continue to consume at least 4-6 meals/snacks per day.  Include protein and produce when you eat.  Aim for 120-130  grams of protein per day.  Try to keep the carbohydrates at 100-120 grams per day or less.  (Try non-fat greek yogurt mixed with ranch dressing for veggie dip, pair feta cheese with beets or watermelon as a snack, mix nonfat greek yogurt with peanut butter for breakfast with fruit, try hummus with cut up veggies as a snack).  3.  Continue to practice eating strategies, eat separately from drinking, avoid straws, chew food 20-30 times before swallowing.  Make the meals last 30 minutes.  4.  Continue to drink at least 64 oz per day of water.  (Try protein water, adding True Lemon, Crystal Light).  5.  Eliminate caffeine and carbonation.  6.  Continue to exercise with a goal of 30 minutes per day for exercise (for example,walking).    7.  Continue strength training 10 minutes 3 days per week.   (7 minute workout song) or (Gym Rat no more-18 at home exercises) or Nike 10 minute workout on [employer]. Focus on on right arm and left leg.  8.  Email the quizzes in the binder on pages 18-24 for review.    Email [email or web address]         Monitor/Evaluate     Anthropometric measurements, Food/fluid intake/choices, Food intolerances, Activity level, Vitamin/mineral supplementation, Reinforce goals, and Calorie/protein intake  Additional RD visits required to review concepts? yes  Patient understands protein requirements? yes  Patient understand fluid requirements (amount and method of intake)? yes  Patient understands post-operative diet? yes  Patient keeping consistent food records? yes  Patient ready for Liquid Protein Education? yes      Alda Parra, MARTA, LDN    Face-to-face time spent with pt: 30 minutes

## 2024-06-20 NOTE — PATIENT INSTRUCTIONS
Recommendations/goals:    1.  Continue to keep a food record, My Net Diary, select the macros dashboard.  2.  Continue to consume at least 4-6 meals/snacks per day.  Include protein and produce when you eat.  Aim for 120-130 grams of protein per day.  Try to keep the carbohydrates at 100-120 grams per day or less.  (Try non-fat greek yogurt mixed with ranch dressing for veggie dip, pair feta cheese with beets or watermelon as a snack, mix nonfat greek yogurt with peanut butter for breakfast with fruit, try hummus with cut up veggies as a snack).  3.  Continue to practice eating strategies, eat separately from drinking, avoid straws, chew food 20-30 times before swallowing.  Make the meals last 30 minutes.  4.  Continue to drink at least 64 oz per day of water.  (Try protein water, adding True Lemon, Crystal Light).  5.  Eliminate caffeine and carbonation.  6.  Continue to exercise with a goal of 30 minutes per day for exercise (for example,walking).    7.  Continue strength training 10 minutes 3 days per week.   (7 minute workout song) or (Gym Rat no more-18 at home exercises) or Nike 10 minute workout on [employer]. Focus on on right arm and left leg.  8.  Email the quizzes in the binder on pages 18-24 for review.    Email [email or web address]

## 2024-06-26 NOTE — PROGRESS NOTES
[facility] BARIATRIC AND WEIGHT LOSS CLINIC  [address]  [facility] [address]  Dept: [phone]  Loc: [phone]    Yareli Lynch verbally consents to a telemedicine service with live, interactive video and audio on 6/27/2024.  Patient understands and accepts financial responsibility for any deductible, co-insurance and/or co-pays associated with this service.   06/27/24    Bariatric Liquid Protein Nutrition Session    Yareli Lynch is a 44 year old female    Assessment   Procedure:  Vertical Sleeve Gastrectomy  Revision: n/a  Surgery Date:  tbd  Medical Diagnosis:  obesity grade III, PCOS, KELECHI, YAMILE waiting for Cpap    Height:    Ht Readings from Last 1 Encounters:   05/29/24 5' 3\" (1.6 m)     Weight:    Wt Readings from Last 6 Encounters:   05/29/24 284 lb (128.8 kg)   05/09/24 288 lb (130.6 kg)   05/01/24 288 lb (130.6 kg)   03/25/24 288 lb (130.6 kg)   01/30/24 287 lb 9.6 oz (130.5 kg)   01/19/24 285 lb 4 oz (129.4 kg)       Weight change:  today's weight on pt home scale is 283 lbs  per pt, down 1 lb from LOV earlier in the month  BMI: There is no height or weight on file to calculate BMI.    Lab Results:  Lab Results   Component Value Date     (H) 03/25/2024    BUN 12 03/25/2024    BUNCREA 15.8 03/25/2024    CREATSERUM 0.76 03/25/2024    ANIONGAP 5 03/25/2024    GFRNAA 86 02/28/2022    GFRAA 99 02/28/2022    CA 8.9 03/25/2024    OSMOCALC 296 (H) 03/25/2024    ALKPHO 66 09/07/2023    AST 13 (L) 09/07/2023    ALT 19 09/07/2023    BILT 0.2 09/07/2023    TP 7.3 09/07/2023    ALB 3.3 (L) 09/07/2023    GLOBULIN 4.0 09/07/2023     03/25/2024    K 3.4 (L) 03/25/2024     03/25/2024    CO2 30.0 03/25/2024       No results found for: \"MG\"  No results found for: \"PHOS\"     Thyroid:    Lab Results   Component Value Date    TSH 1.360 09/07/2023    TSH 1.010 09/22/2021       Iron Panel:  Lab Results   Component Value Date    IRON 31 (L) 09/07/2023    TRANSFERRIN 359  09/07/2023    TIBCP 535 (H) 09/07/2023    SAT 6 (L) 09/07/2023       CBC:  Lab Results   Component Value Date    WBC 13.0 (H) 01/17/2024    WBC 16.2 (H) 01/16/2024    WBC 19.2 (H) 01/15/2024     Lab Results   Component Value Date    HGB 10.8 (L) 01/17/2024    HGB 11.6 (L) 01/16/2024    HGB 11.9 (L) 01/15/2024      Lab Results   Component Value Date    .0 01/17/2024    .0 01/16/2024    .0 01/15/2024        Diabetes:    HGBA1C:    Lab Results   Component Value Date    A1C 6.1 (H) 01/15/2024    A1C 6.7 (H) 09/07/2023    A1C 5.7 (H) 02/28/2022     (H) 01/15/2024       Lipid Panel:  Lab Results   Component Value Date    CHOLEST 160 01/14/2024    TRIG 77 01/14/2024    HDL 49 01/14/2024    LDL 96 01/14/2024    VLDL 13 01/14/2024    NONHDLC 111 01/14/2024       Vitamins/Minerals:  Lab Results   Component Value Date    B12 377 09/07/2023     Lab Results   Component Value Date    VITD 22.2 (L) 09/07/2023     No results found for: \"THIAMINE\"   Lab Results   Component Value Date/Time    VITB1 96.2 09/07/2023 03:17 PM     Lab Results   Component Value Date/Time    FOLIC 14.2 09/07/2023 03:17 PM       Meds:    Current Outpatient Medications:     semaglutide 4 MG/3ML Subcutaneous Solution Pen-injector, Inject 1 mg into the skin once a week., Disp: 3 mL, Rfl: 3    albuterol 108 (90 Base) MCG/ACT Inhalation Aero Soln, Inhale 1-2 puffs into the lungs every 4 to 6 hours as needed for Wheezing., Disp: 18 g, Rfl: 3    dilTIAZem  MG Oral Capsule SR 24 Hr, Take 1 capsule (240 mg total) by mouth daily., Disp: 90 capsule, Rfl: 0    losartan 25 MG Oral Tab, Take 1 tablet (25 mg total) by mouth daily., Disp: 90 tablet, Rfl: 0    ergocalciferol 1.25 MG (74714 UT) Oral Cap, Take 1 capsule (50,000 Units total) by mouth once a week. (Patient not taking: Reported on 3/25/2024), Disp: , Rfl:     Ferrous Sulfate 325 (65 Fe) MG Oral Tab, Take 1 tablet (325 mg total) by mouth daily., Disp: , Rfl:     montelukast 10 MG  Oral Tab, Take 1 tablet (10 mg total) by mouth nightly., Disp: , Rfl:     hydroCHLOROthiazide 25 MG Oral Tab, Take 1 tablet (25 mg total) by mouth daily., Disp: 90 tablet, Rfl: 0    albuterol (2.5 MG/3ML) 0.083% Inhalation Nebu Soln, Take 3 mL (2.5 mg total) by nebulization every 4 (four) hours as needed for Wheezing or Shortness of Breath., Disp: 120 mL, Rfl: 0    Budesonide-Formoterol Fumarate (SYMBICORT) 160-4.5 MCG/ACT Inhalation Aerosol, Inhale 2 puffs into the lungs 2 (two) times daily. Rinse mouth, gargle, and spit to follow., Disp: 1 each, Rfl: 0      Diet recall:          Breakfast         Snack         Lunch          Snack        Dinner         Snack   Overnight oats       Pistachios or cashews handful or craisins with peanuts  Protein shake with blueberries and oat milk or Chiobani with granola or sweet kale salad with 2 hb eggs  Skips or greek yogurt with granola  Pesto spinach with mozarella cheese Bruchetta Chicken or pork chops with potatoes or Palestinian chicken air fried or chick pea pasta or frozen veggies broccoli spinach or peas or Kale salad from [employer] with cheese and hb eggs or rice wild and steak and half pork chop  skips         Total Kcal: ~9297-0965 calories/day  Protein intake: 76 grams/day  Carbs: 68 gm/day  Fluid intake:  64+ ounces/day    Excessive in: nothing  Inadequate in:  nothing  Patient has made some modifications and adjustments to diet: yes,  is eating smaller portions, is eating more vegetables-now at 2 servings per day, is drinking more water, has eliminated soda- used to be 3-5 cans per day, is eating more slowly at dinner meal, is chewing more before swallowing, is eating separately from drinking-is waiting 30 minutes after eating to drink, is eating 5-6 times per day-used to be 3 times, is eating 1 serving of fruit every day during the week.   Meal pattern consists of 2 meals, 3 snacks and 1-2 protein supplements.    Vitamin/mineral supplements:  Iron, Other: PreNatal gummie,  and Vit D waiting for to take  Protein supplements:  Premier Protein  Activity Level: active  Type: walk and other: hand weights  Duration: 30-40 minutes  Frequency: per day    Other:  Met with pt for virtual LPD instruction visit.  Per pt is continuing to take ozempic and feels it still helps with feeling phillips faster and is helping her to eat less. Pt continues to pursue VSG with Dr. Rueda.   Per pt has been keeping a food record, however, need to download again due to having a new phone.  Per pt has eliminated coffee completely since LOV.  Per pt is consuming  and average of ~9270-0264 calories, 76 gm protein and keeping carbs appropriately low per day.  Per pt is eating smaller portions, is eating more vegetables-now at 2 servings per day, is drinking more water, is practicing eating slowly, is chewing more before swallowing, is eating separately from drinking-is waiting 30 minutes after eating to drink, is eating 5-6 times per day-used to be 3 times, is eating 1 serving of fruit every day during the week.   Discussed ideas for continuing to increase protein with meals/snacks.  Provided ideas.  Pt verbalized understanding.  Per pt is consuming more water is now drinking at least 64 oz of fluids per day.   Per pt is continuing to walk at least 30-40 minutes per day.  Per pt is strength training by using hand weights twice per week.  Pt congratulated for making lifestyle changes.  Reviewed quizzes in binder.  Pt scored A+ on all quizzes after review.  Pt to send signed commitment for bariatric surgery to writer.  Reviewed LPD instruction.  Answered pt questions.    Pt is ready for bariatric surgery from a dietary standpoint.      Nutrition Diagnosis: Morbid obesity: Improvement shown    Intervention     Interventions: Counseled for liquid protein diet, Gave hospital orientation, Reviewed quizzes, Materials given LPD checklist, snapshot of diet progression and dietary guidelines following surgery, and Vertical  sleeve gastrectomy surgery scheduled for tbd  Reviewed ERAS requirements.  Provided CHG bath and carb loading handouts.  Provided/reviewed handout on pain control after bariatric surgery.  Recommendations/goals:   Keep it going goals:  1.  Continue to keep a food record, My Net Diary, select the macros dashboard.  2.  Continue to consume at least 4-6 meals/snacks per day.  Include protein and produce when you eat.  Aim for 120-130 grams of protein per day.  Try to keep the carbohydrates at 100-120 grams per day or less.  (Try non-fat greek yogurt mixed with ranch dressing for veggie dip, pair feta cheese with beets or watermelon as a snack, mix nonfat greek yogurt with peanut butter for breakfast with fruit, try hummus with cut up veggies as a snack).  3.  Continue to practice eating strategies, eat separately from drinking, avoid straws, chew food 20-30 times before swallowing.  Make the meals last 30 minutes.  4.  Continue to drink at least 64 oz per day of water.  (Try protein water, adding True Lemon, Crystal Light).  5.  Continue to avoid caffeine and carbonation.  6.  Continue to exercise with a goal of 30 minutes per day for exercise (for example,walking).    7.  Continue strength training 10 minutes 3 days per week.   (7 minute workout song) or (Gym Rat no more-18 at home exercises) or Nike 10 minute workout on [employer]'s. Focus on on right arm and left leg.  8.  Email the commitment for Bariatric Surgery.   Work in progress goals:     1.  Buy the bariatric chewable vitamin, Bariatric Choice.  2.  Line up your protein supplements for liquid protein.  3.  Buy 1 bottle of CHG soap.  4.  Buy the Ensure -Pre Surgery drink, 2 bottles.  5.  Buy the liquid or dissolvable pack of Tylenol.  Questions? Email [email or web address]       Monitor/Evaluate     Anthropometric measurements, Food/fluid intake/choices, Food intolerances, Activity level, Vitamin/mineral supplementation, Reinforce goals, and Calorie/protein  intake  Patient understands protein requirements? yes  Patient understand fluid requirements (amount and method of intake)? yes  Patient understands post-operative diet? yes  Patient completed nutrition quizzes? yes  Patient signed commitment agreement? Yes, to be sent by patient to writer today  Patient with RD clearance for surgery? yes    Alda Parra RD, LDN    Face-to-face time spent with pt: 45 minutes

## 2024-06-27 ENCOUNTER — TELEMEDICINE (OUTPATIENT)
Dept: INTERNAL MEDICINE CLINIC | Facility: CLINIC | Age: 44
End: 2024-06-27

## 2024-06-27 DIAGNOSIS — E11.9 TYPE 2 DIABETES MELLITUS WITHOUT COMPLICATION, WITHOUT LONG-TERM CURRENT USE OF INSULIN (HCC): ICD-10-CM

## 2024-06-27 DIAGNOSIS — I10 HTN (HYPERTENSION), BENIGN: ICD-10-CM

## 2024-06-27 DIAGNOSIS — E66.01 CLASS 3 SEVERE OBESITY WITH BODY MASS INDEX (BMI) OF 45.0 TO 49.9 IN ADULT, UNSPECIFIED OBESITY TYPE, UNSPECIFIED WHETHER SERIOUS COMORBIDITY PRESENT (HCC): Primary | ICD-10-CM

## 2024-06-27 PROCEDURE — 97803 MED NUTRITION INDIV SUBSEQ: CPT

## 2024-06-27 NOTE — PATIENT INSTRUCTIONS
Recommendations/goals:   Keep it going goals:  1.  Continue to keep a food record, My Net Diary, select the macros dashboard.  2.  Continue to consume at least 4-6 meals/snacks per day.  Include protein and produce when you eat.  Aim for 120-130 grams of protein per day.  Try to keep the carbohydrates at 100-120 grams per day or less.  (Try non-fat greek yogurt mixed with ranch dressing for veggie dip, pair feta cheese with beets or watermelon as a snack, mix nonfat greek yogurt with peanut butter for breakfast with fruit, try hummus with cut up veggies as a snack).  3.  Continue to practice eating strategies, eat separately from drinking, avoid straws, chew food 20-30 times before swallowing.  Make the meals last 30 minutes.  4.  Continue to drink at least 64 oz per day of water.  (Try protein water, adding True Lemon, Crystal Light).  5.  Continue to avoid caffeine and carbonation.  6.  Continue to exercise with a goal of 30 minutes per day for exercise (for example,walking).    7.  Continue strength training 10 minutes 3 days per week.   (7 minute workout song) or (Gym Rat no more-18 at home exercises) or Nike 10 minute workout on [employer]'s. Focus on on right arm and left leg.  8.  Email the commitment for Bariatric Surgery.   Work in progress goals:     1.  Buy the bariatric chewable vitamin, Bariatric Choice.  2.  Line up your protein supplements for liquid protein.  3.  Buy 1 bottle of CHG soap.  4.  Buy the Ensure -Pre Surgery drink, 2 bottles.  5.  Buy the liquid or dissolvable pack of Tylenol.  Questions? Email [email or web address]

## 2024-07-24 ENCOUNTER — OFFICE VISIT (OUTPATIENT)
Dept: SURGERY | Facility: CLINIC | Age: 44
End: 2024-07-24
Payer: COMMERCIAL

## 2024-07-24 VITALS
DIASTOLIC BLOOD PRESSURE: 80 MMHG | HEART RATE: 93 BPM | SYSTOLIC BLOOD PRESSURE: 126 MMHG | HEIGHT: 63 IN | WEIGHT: 281 LBS | BODY MASS INDEX: 49.79 KG/M2 | OXYGEN SATURATION: 96 %

## 2024-07-24 DIAGNOSIS — I10 HTN (HYPERTENSION), BENIGN: ICD-10-CM

## 2024-07-24 DIAGNOSIS — E66.01 MORBID OBESITY WITH BMI OF 50.0-59.9, ADULT (HCC): Primary | ICD-10-CM

## 2024-07-24 DIAGNOSIS — Z51.81 ENCOUNTER FOR THERAPEUTIC DRUG MONITORING: ICD-10-CM

## 2024-07-24 DIAGNOSIS — E11.9 TYPE 2 DIABETES MELLITUS WITHOUT COMPLICATION, WITHOUT LONG-TERM CURRENT USE OF INSULIN (HCC): ICD-10-CM

## 2024-07-24 RX ORDER — APREPITANT 40 MG/1
40 CAPSULE ORAL ONCE
Qty: 1 CAPSULE | Refills: 0 | Status: SHIPPED | OUTPATIENT
Start: 2024-07-24 | End: 2024-07-24

## 2024-07-24 NOTE — PROGRESS NOTES
[facility], [facility], [place]  [address]  [facility] [address]  Dept: [phone]    2024   Bariatric Surgery Patient Evaluation    Chief Complaint:  morbid obesity     History of Present Illness:  Yareli Lynch is a 44 year old female presenting for surgical follow-up.  Today she weighs 281 pounds which is 3 fewer than last visit.  She has been getting additional clearances done..    Past Medical History:    Past Medical History:    Allergic rhinitis    Anxiety    Asthma (HCC)    DVT (deep vein thrombosis) in pregnancy ([facility])    Encounter for therapeutic drug monitoring    Essential hypertension    Gestational diabetes (HCC)    High blood pressure    High myopia    History of IBS    History of PCOS    Hypertension    Infertility, female    Morbid obesity with BMI of 45.0-49.9, adult ([facility])    Obesity    PTSD (post-traumatic stress disorder)    Sleep apnea       Past Surgical History:    Past Surgical History:   Procedure Laterality Date      2019, 2021    Colonoscopy      Colonoscopy  2024    ; colon polyps, hemorrhoids    Colonoscopy N/A 2024    Procedure: COLONOSCOPY;  Surgeon: Olive Ny MD;  Location: [facility] ENDOSCOPY    Egd  2024    ; gastric polyps    Ivf package  2018    Other surgical history      egg retrieval for IVF       Family History:    Family History   Problem Relation Age of Onset    Thyroid Disorder Father     Diabetes Father     Diabetes Mother     Hypertension Mother     No Known Problems Maternal Grandmother     No Known Problems Maternal Grandfather     Glaucoma Neg     Retinal detachment Neg     Macular degeneration Neg        Social History:    Social History     Socioeconomic History    Marital status:    Occupational History    Occupation: health    Tobacco Use    Smoking status: Former    Smokeless tobacco: Never    Tobacco  comments:     On and off   Vaping Use    Vaping status: Never Used   Substance and Sexual Activity    Alcohol use: No    Drug use: No    Sexual activity: Yes     Partners: Female     Comment: with wife    Other Topics Concern    Blood Transfusions No       Medications:   Current Outpatient Medications:     semaglutide 4 MG/3ML Subcutaneous Solution Pen-injector, Inject 1 mg into the skin once a week., Disp: 3 mL, Rfl: 3    albuterol 108 (90 Base) MCG/ACT Inhalation Aero Soln, Inhale 1-2 puffs into the lungs every 4 to 6 hours as needed for Wheezing., Disp: 18 g, Rfl: 3    dilTIAZem  MG Oral Capsule SR 24 Hr, Take 1 capsule (240 mg total) by mouth daily., Disp: 90 capsule, Rfl: 0    losartan 25 MG Oral Tab, Take 1 tablet (25 mg total) by mouth daily., Disp: 90 tablet, Rfl: 0    ergocalciferol 1.25 MG (70500 UT) Oral Cap, Take 1 capsule (50,000 Units total) by mouth once a week. (Patient not taking: Reported on 3/25/2024), Disp: , Rfl:     Ferrous Sulfate 325 (65 Fe) MG Oral Tab, Take 1 tablet (325 mg total) by mouth daily., Disp: , Rfl:     montelukast 10 MG Oral Tab, Take 1 tablet (10 mg total) by mouth nightly., Disp: , Rfl:     hydroCHLOROthiazide 25 MG Oral Tab, Take 1 tablet (25 mg total) by mouth daily., Disp: 90 tablet, Rfl: 0    albuterol (2.5 MG/3ML) 0.083% Inhalation Nebu Soln, Take 3 mL (2.5 mg total) by nebulization every 4 (four) hours as needed for Wheezing or Shortness of Breath., Disp: 120 mL, Rfl: 0    Budesonide-Formoterol Fumarate (SYMBICORT) 160-4.5 MCG/ACT Inhalation Aerosol, Inhale 2 puffs into the lungs 2 (two) times daily. Rinse mouth, gargle, and spit to follow., Disp: 1 each, Rfl: 0     Allergies:    Allergies   Allergen Reactions    Lisinopril Coughing and UNKNOWN       ROS:      Constitutional: negative  HEENT: negative  Respiratory: negative  Cardiovascular: negative  Gastrointestinal: negative  Genitourinary: negative  Musculoskeletal: negative  Neurological:  negative  Psychological: negative  Endocrine: negative  Immunologic: negative  Integumentary: negative      Physical Exam:  Vital Signs:  /80   Pulse 93   Ht 5' 3\" (1.6 m)   Wt 281 lb (127.5 kg)   LMP 03/04/2024   SpO2 96%   BMI 49.78 kg/m²   BMI:  Body mass index is 49.78 kg/m².  General: no acute distress, well-nourished  HENT: normocephalic, atraumatic  Lung: breathing comfortably, symmetrical expansion, no wheezing  Heart: regular rate and rhythm  Abdomen: soft, obese, non-tender, non-distended, no hernia/mass/organomegaly, well-healed Pfannenstiel  Extremities: normal strength, normal ROM, walks without assist device  Neuro: no focal deficits, cranial nerves grossly intact  Psych: alert and oriented, normal affect  Skin: warm, dry    Assessment: 44 year old female with chronic morbid obesity who would benefit from significant and sustained weight loss.  EGD was negative for any signs of esophagitis or Sidhu's.  There was no hiatal hernia.  Her decision for sleeve gastrectomy is the better of 2 options in my opinion given her potential need for future steroids and relatively clean EGD.    Dietitian -cleared  Cardiology - cleared  Pulmonology - cleared Mar 2024  Psych - cleared  GI - EGD/colonoscopy reviewed  Bariatrician -Dr. Jackson  Labs - low vit D and iron, supp rx    Plan:     The patient appears to be an excellent candidate for bariatric surgery.  Patient has opted for sleeve gastrectomy.  Patient has completed the routine pre-operative cardiac, pulmonary, nutrition, and psychology evaluations.  I have no further concerns.  Risks of the surgery including but not limited to bleeding, infection, VTE, leak, ulceration, hernias, failure to achieve desired weight loss, acid reflux, and damage to adjacent structures were discussed with the patient who wishes to proceed.  Possibility of encountering a significant hiatal hernia that would require repair with associated risk of re-herniation discussed  as well.  On the other hand, a hiatal hernia evaluated at the time of laparoscopy might not be repaired if felt to be small, clinically not significant, or if technical factors preclude a safe repair.    I have discussed with the patient through a lengthy preoperative process and they verbalize understanding regarding adherence to post-operative regimen, willingness to comply with lifestyle and behavioral modifications, ongoing nutrition recommendations, increased physical activity and exercise, participation in support group, and to make and maintain choices that will improve overall health.  We will be submitting the case to insurance company for approval with plans for surgery on 9/23/24.       Procedure:   LAGB - 0  Lap RYGB - 3  Lap Sleeve - 4  Open RYGB - 6    Age <30 - 1  30-39 - 2  40-49 - 3  50-59 - 4  60+ - 5    BMI - <40 - 1  40-49 - 2  50-59 - 3  60+ - 4    Male - 2  Smoking history - 2  OR time > 3 hours - 2  Prior history of VTE - 5    Total Score  9    0-14 --> Low risk --> standard prophylaxis  15-19 --> Medium risk --> protocol for postop chemoprophylaxis  20 or greater --> high risk --> consider therapeutic chemoprophylaxis.     Jarek Rueda MD, 07/24/24, 12:10 PM

## 2024-07-25 ENCOUNTER — TELEPHONE (OUTPATIENT)
Facility: CLINIC | Age: 44
End: 2024-07-25

## 2024-07-25 ENCOUNTER — PATIENT MESSAGE (OUTPATIENT)
Dept: GASTROENTEROLOGY | Facility: CLINIC | Age: 44
End: 2024-07-25

## 2024-07-25 NOTE — TELEPHONE ENCOUNTER
Patient is having massive stomach pain for 1 week now that comes/goes, but worsening. Patient also has diarrhea and vomiting, please call at [phone],thanks.  *Patient is currently in [place] and asking if she can have a phone visit.

## 2024-07-25 NOTE — TELEPHONE ENCOUNTER
LOV 2024  Last colonoscopy 2024    Spoke to patient  Name, SATISH trejo    Patient states starting last Tuesday, she had a sudden onset of 10/10 abdominal pain that is located below her sternum. The pain is intermittent. She has been taking tums, Gas-X, and pepto which will relieve the pain for a little bit. However, the pain comes back as severe 10/10    Patient states she is also experiencing vomiting and diarrhea.   She denies BRBPR, fevers    I advised patient to go to the ER for an urgent evaluation. Patient asked if she can do a phone visit or go to the urgent care. I explained that if she is having 10/10 severe abdominal pain, she needs to be evaluated in the ER.    Patient verbalized understanding and was agreeable to the plan

## 2024-07-25 NOTE — TELEPHONE ENCOUNTER
From: Yareli Lynch  To: Olive Ny  Sent: 7/25/2024 11:06 AM CDT  Subject: Extreme pain     Hello,    I am in extreme pain in my stomach for more than a week now. Vomiting and diarrhea. Please help?!

## 2024-07-26 NOTE — TELEPHONE ENCOUNTER
Spoke with patient    She did not go to the ER  She states she took more Gas-X and Pepto last night and she feels much better  Pain is 3/10  Denies any episodes of diarrhea or vomiting since yesterday    I scheduled patient for a clinic visit with Tarah in 2 weeks to follow up.  I advised patient to call us back if symptoms return or go to the ER.    She verbalized understanding.      Your Appointments      Wednesday August 14, 2024 2:00 PM  Follow Up Visit with PIOTR Harris  [facility] ([facility]) [facility] [address] [address]  [phone]            Dr. Schaefer,  Let me know if you have further recommendations!  Thank you

## 2024-08-08 RX ORDER — DILTIAZEM HYDROCHLORIDE 240 MG/1
240 CAPSULE, COATED, EXTENDED RELEASE ORAL DAILY
Qty: 90 CAPSULE | Refills: 3 | Status: SHIPPED | OUTPATIENT
Start: 2024-08-08

## 2024-08-08 NOTE — TELEPHONE ENCOUNTER
Refill passed per [facility] protocol.  Requested Prescriptions   Pending Prescriptions Disp Refills    dilTIAZem  MG Oral Capsule SR 24 Hr [Pharmacy Med Name: DILTIAZEM 24H ER(CD) 240 MG CP] 90 capsule 3     Sig: Take 1 capsule (240 mg total) by mouth daily.       Hypertension Medications Protocol Passed - 8/5/2024  2:26 PM        Passed - CMP or BMP in past 12 months        Passed - Last BP reading less than 140/90     BP Readings from Last 1 Encounters:   07/24/24 126/80               Passed - In person appointment or virtual visit in the past 12 mos or appointment in next 3 mos     Recent Outpatient Visits              2 weeks ago Morbid obesity with BMI of 50.0-59.9, adult (HCC)    [facility]    1 month ago Class 3 severe obesity with body mass index (BMI) of 45.0 to 49.9 in adult, unspecified obesity type, unspecified whether serious comorbidity present (HCC) [E66.01, Z68.42]    [address]    Telemedicine    1 month ago Morbid obesity with BMI of 50.0-59.9, adult (HCC) [E66.01, Z68.43]    [address] Ald[address]    Telemedicine    2 months ago     [facility] Jarek Coto MD    Office Visit    2 months ago Morbid obesity with BMI of 50.0-59.9, adult (HCC) [E66.01, Z68.43]    [facility] [address]    Telemedicine          Future Appointments         Provider Department Appt Notes    In 2 weeks Neftali Jackson MD [facility] ([place], [place])                     Passed - EGFRCR or GFRNAA > 50     GFR Evaluation  EGFRCR: 98 , resulted on 3/25/2024             Recent Outpatient Visits              2 weeks ago Morbid obesity with BMI of 50.0-59.9, adult (HCC)    [facility] ([place], [place])  Jarek Rueda MD    Office Visit    1 month ago Class 3 severe obesity with body mass index (BMI) of 45.0 to 49.9 in adult, unspecified obesity type, unspecified whether serious comorbidity present (HCC) [E66.01, Z68.42]    [address] Ald[address]    Telemedicine    1 month ago Morbid obesity with BMI of 50.0-59.9, adult (HCC) [E66.01, Z68.43]    [address]    Telemedicine    2 months ago     [facility]    Office Visit    2 months ago Morbid obesity with BMI of 50.0-59.9, adult (HCC) [E66.01, Z68.43]    [facility]          Future Appointments         Provider Department Appt Notes    In 2 weeks [facility]ali [facility] MD [facility]

## 2024-08-08 NOTE — TELEPHONE ENCOUNTER
Refill passed per [facility] protocol.  Requested Prescriptions   Pending Prescriptions Disp Refills    DILTIAZEM  MG Oral Capsule SR 24 Hr [Pharmacy Med Name: DILTIAZEM 24H ER(CD) 240 MG CP] 30 capsule 2     Sig: TAKE 1 CAPSULE BY MOUTH EVERY DAY       Hypertension Medications Protocol Passed - 8/5/2024  2:26 PM        Passed - CMP or BMP in past 12 months        Passed - Last BP reading less than 140/90     BP Readings from Last 1 Encounters:   07/24/24 126/80               Passed - In person appointment or virtual visit in the past 12 mos or appointment in next 3 mos     Recent Outpatient Visits              2 weeks ago Morbid obesity with BMI of 50.0-59.9, adult (HCC)    [facility]    1 month ago Class 3 severe obesity with body mass index (BMI) of 45.0 to 49.9 in adult, unspecified obesity type, unspecified whether serious comorbidity present (HCC) [E66.01, Z68.42]    [address]    Telemedicine    1 month ago Morbid obesity with BMI of 50.0-59.9, adult (HCC) [E66.01, Z68.43]    [address]    Telemedicine    2 months ago     [facility]    2 months ago Morbid obesity with BMI of 50.0-59.9, adult (HCC) [E66.01, Z68.43]    [facility]          Future Appointments         Provider Department Appt Notes    In 2 weeks Neftali Jackson MD [facility]                     Passed - EGFRCR or GFRNAA > 50     GFR Evaluation  EGFRCR: 98 , resulted on 3/25/2024             Recent Outpatient Visits              2 weeks ago Morbid obesity with BMI of 50.0-59.9, adult (HCC)    [facility]    Office Visit    1 month ago Class 3 severe obesity with body mass index (BMI) of 45.0 to 49.9 in adult, unspecified obesity type, unspecified whether serious comorbidity present (HCC) [E66.01, Z68.42]    [address]    Telemedicine    1 month ago Morbid obesity with BMI of 50.0-59.9, adult (HCC) [E66.01, Z68.43]    [address]    Telemedicine    2 months ago     [facility] Jarek Rueda MD    Office Visit    2 months ago Morbid obesity with BMI of 50.0-59.9, adult (HCC) [E66.01, Z68.43]    [facility] Alda Davis RD    Telemedicine          Future Appointments         Provider Department Appt Notes    In 2 weeks [facility]

## 2024-08-22 DIAGNOSIS — E66.01 MORBID OBESITY (HCC): Primary | ICD-10-CM

## 2024-08-26 ENCOUNTER — TELEPHONE (OUTPATIENT)
Facility: CLINIC | Age: 44
End: 2024-08-26

## 2024-08-27 ENCOUNTER — TELEMEDICINE (OUTPATIENT)
Dept: SURGERY | Facility: CLINIC | Age: 44
End: 2024-08-27
Payer: COMMERCIAL

## 2024-08-27 VITALS — BODY MASS INDEX: 49.79 KG/M2 | WEIGHT: 281 LBS | HEIGHT: 63 IN

## 2024-08-27 DIAGNOSIS — E11.9 TYPE 2 DIABETES MELLITUS WITHOUT COMPLICATION, WITHOUT LONG-TERM CURRENT USE OF INSULIN (HCC): Primary | ICD-10-CM

## 2024-08-27 DIAGNOSIS — E66.01 MORBID OBESITY WITH BMI OF 50.0-59.9, ADULT (HCC): ICD-10-CM

## 2024-08-27 DIAGNOSIS — I10 HTN (HYPERTENSION), BENIGN: ICD-10-CM

## 2024-08-27 DIAGNOSIS — Z51.81 ENCOUNTER FOR THERAPEUTIC DRUG MONITORING: ICD-10-CM

## 2024-08-27 PROCEDURE — 99214 OFFICE O/P EST MOD 30 MIN: CPT | Performed by: INTERNAL MEDICINE

## 2024-08-27 NOTE — PROGRESS NOTES
[facility], [facility], [place]  [address]  [place] [address]  Dept: [phone]     Virtual video Check-In    Yareli Lynch verbally consents to a Virtual/Telephone Check-In visit on 2024.  Patient has been referred to the [facility] website at [email or web address] to review the yearly Consent to Treat document.    Patient understands and accepts financial responsibility for any deductible, co-insurance and/or co-pays associated with this service.    Duration of the service: 22 minutes      Video visit    Patient:  Yareli Lynch  :      3/23/1980  MRN:      [id number]    Chief Complaint:    Chief Complaint   Patient presents with    Follow - Up    Pre-Op Exam     Pre surgical weight loss. Video visit       SUBJECTIVE     History of Present Illness:  Yareli is being seen today for a follow-up for non surgical weight loss    Past Medical History:   Past Medical History:    Allergic rhinitis    Anxiety    Asthma (HCC)    DVT (deep vein thrombosis) in pregnancy ([facility])    Encounter for therapeutic drug monitoring    Essential hypertension    Gestational diabetes (HCC)    High blood pressure    High myopia    History of IBS    History of PCOS    Hypertension    Infertility, female    Morbid obesity with BMI of 45.0-49.9, adult (HCC)    Obesity    PTSD (post-traumatic stress disorder)    Sleep apnea        Comorbidities:  KELECHI-Improvement?  yes and Snoring-Improvement?  yes    OBJECTIVE     Vitals: Ht 5' 3\" (1.6 m)   Wt 281 lb (127.5 kg)   LMP 2024   BMI 49.78 kg/m²     Initial weight loss: -07   Total weight loss: +11   Start weight: 270    Wt Readings from Last 3 Encounters:   24 281 lb (127.5 kg)   24 281 lb (127.5 kg)   24 284 lb (128.8 kg)       Patient Medications:    Current Outpatient Medications   Medication Sig Dispense Refill    dilTIAZem  MG Oral Capsule SR 24 Hr Take 1 capsule (240 mg total) by mouth daily. 90  capsule 3    albuterol 108 (90 Base) MCG/ACT Inhalation Aero Soln Inhale 1-2 puffs into the lungs every 4 to 6 hours as needed for Wheezing. 18 g 3    losartan 25 MG Oral Tab Take 1 tablet (25 mg total) by mouth daily. 90 tablet 0    hydroCHLOROthiazide 25 MG Oral Tab Take 1 tablet (25 mg total) by mouth daily. 90 tablet 0    albuterol (2.5 MG/3ML) 0.083% Inhalation Nebu Soln Take 3 mL (2.5 mg total) by nebulization every 4 (four) hours as needed for Wheezing or Shortness of Breath. 120 mL 0    Budesonide-Formoterol Fumarate (SYMBICORT) 160-4.5 MCG/ACT Inhalation Aerosol Inhale 2 puffs into the lungs 2 (two) times daily. Rinse mouth, gargle, and spit to follow. 1 each 0     Allergies:  Lisinopril     Social History:    Social History     Socioeconomic History    Marital status:      Spouse name: Not on file    Number of children: Not on file    Years of education: Not on file    Highest education level: Not on file   Occupational History    Occupation: health    Tobacco Use    Smoking status: Former    Smokeless tobacco: Never    Tobacco comments:     On and off   Vaping Use    Vaping status: Never Used   Substance and Sexual Activity    Alcohol use: No    Drug use: No    Sexual activity: Yes     Partners: Female     Comment: with wife    Other Topics Concern     Service Not Asked    Blood Transfusions No    Caffeine Concern Not Asked    Occupational Exposure Not Asked    Hobby Hazards Not Asked    Sleep Concern Not Asked    Stress Concern Not Asked    Weight Concern Not Asked    Special Diet Not Asked    Back Care Not Asked    Exercise Not Asked    Bike Helmet Not Asked    Seat Belt Not Asked    Self-Exams Not Asked   Social History Narrative    No H/O abuse     Lives with wife Hallie     Social Determinants of Health     Financial Resource Strain: Low Risk  (1/18/2024)    Financial Resource Strain     Difficulty of Paying Living Expenses: Not on file     Med Affordability: No   Food  Insecurity: No Food Insecurity (2024)    Food Insecurity     Food Insecurity: Never true   Transportation Needs: No Transportation Needs (2024)    Transportation Needs     Lack of Transportation: No   Physical Activity: Inactive (2020)    Received from [facility]    Exercise Vital Sign     Days of Exercise per Week: 0 days     Minutes of Exercise per Session: 0 min   Stress: No Stress Concern Present (2020)    Received from [facility]    [employer] [place] [employer] - Occupational Stress Questionnaire     Feeling of Stress : Not at all   Social Connections: Somewhat Isolated (2020)    Received from [facility]    Social Connection and Isolation Panel [NHANES]     Frequency of Communication with Friends and Family: More than three times a week     Frequency of Social Gatherings with Friends and Family: More than three times a week     Attends Sabianism Services: Never     Active Member of Clubs or Organizations: No     Attends Club or Organization Meetings: Never     Marital Status:    Housing Stability: Low Risk  (2024)    Housing Stability     Housing Instability: No     Housing Instability Emergency: Not on file     Crib or Bassinette: Not on file     Surgical History:    Past Surgical History:   Procedure Laterality Date      2019, 2021    Colonoscopy      Colonoscopy  2024    ; colon polyps, hemorrhoids    Colonoscopy N/A 2024    Procedure: COLONOSCOPY;  Surgeon: Olive Ny MD;  Location: [facility] ENDOSCOPY    Egd  2024    ; gastric polyps    Ivf package  2018    Other surgical history      egg retrieval for IVF     Family History:    Family History   Problem Relation Age of Onset    Thyroid Disorder Father      Diabetes Father     Diabetes Mother     Hypertension Mother     No Known Problems Maternal Grandmother     No Known Problems Maternal Grandfather     Glaucoma Neg     Retinal detachment Neg     Macular degeneration Neg        Food Journal  Reviewed and Discussed:       Patient has a Food Journal?: yes   Patient is reading nutrition labels?  yes  Average Caloric Intake:     Average CHO Intake: 150  Is patient exercising? yes  Type of exercise?     Eating Habits  Patient states the following:  Eats 3 meal(s) per day  Length of time it takes to consume a meal:  20  # of snacks per day: 1 Type of snacks:  chocolate  Amount of soda consumption per day:  regular  Amount of water (in ounces) per day:  64  Drinking between meals only:  yes  Toughest challenge:  soda    Nutritional Goals  Limit carbohydrates to 100 gms per day, Eat 100-200 calories within 1 hour of waking  and Eat 3-4 cups of fresh fruits or vegetables daily    Behavior Modifications Reviewed and Discussed  Eat breakfast, Eat 3 meals per day, Plan meals in advance, Read nutrition labels, Drink 64 oz of water per day, Maintain a daily food journal, No drinking 30 minutes before or after meals, Utlize portion control strategies to reduce calorie intake, Identify triggers for eating and manage cues and Eat slowly and take 20 to 30 minutes to complete each meal    Exercise Goals Reviewed and Discussed        ROS:    Constitutional: positive for fatigue  Respiratory: negative  Cardiovascular: negative  Gastrointestinal: positive for constipation  Musculoskeletal:positive for arthralgias and back pain  Neurological: positive for headaches  Behavioral/Psych: positive for stress  Endocrine: negative  All other systems were reviewed and are negative    Physical Exam:   General appearance: alert, appears stated age, cooperative and moderately obese  Head: Normocephalic, without obvious abnormality, atraumatic  Back: symmetric, no curvature. ROM normal. No CVA  tenderness.  Lungs: clear to auscultation bilaterally  Heart: S1, S2 normal, no murmur, click, rub or gallop, regular rate and rhythm  Abdomen:  soft, obese, non tender  Extremities: extremities normal, atraumatic, no cyanosis or edema  Pulses: 2+ and symmetric  Skin: Skin color, texture, turgor normal. No rashes or lesions  Neurologic: Grossly normal    ASSESSMENT     HYPERTENSION:  The patient's blood pressure has been well controlled.  she has been checking it as instructed and has remained in relatively good control.    Encounter Diagnosis(ses):   Encounter Diagnoses   Name Primary?    Type 2 diabetes mellitus without complication, without long-term current use of insulin (HCC) Yes    HTN (hypertension), benign     Encounter for therapeutic drug monitoring     Morbid obesity with BMI of 50.0-59.9, adult ([facility])        PLAN     Discussed with patient the risks and benefits of  VSG. The patient is interested in bariatric surgery and has begun our presurgical process.     HYPERTENSION: Blood pressure stable on the above medications. No interval change in antihypertensive medication.     OBSTRUCTIVE SLEEP APNEA: Given the patient's history suggestive of obstructive sleep apnea as outlined above, consideration for obtaining a sleep study may be warranted.  Further consideration for obtaining the sleep study will be discussed with the patient's PCP.    DIABETES: Continue current medications.         Goals for next month:  1. Keep a food log.  2. Drink 48-64 ounces of non-caloric beverages per day. No fruit juices or regular soda.  3. Increase activity-upper body exercises, walk 10 minutes per day.  4. Increase fruit and vegetable servings to 5-6 per day.      VSG scheduled for 09/23 with Dr Mohit Abdi over pre and post op meds    Outpatient Encounter Medications as of 8/27/2024   Medication Sig Note    dilTIAZem  MG Oral Capsule SR 24 Hr Take 1 capsule (240 mg total) by mouth daily. 8/27/2024: Continue before  and after bariatric surgery    [DISCONTINUED] semaglutide 4 MG/3ML Subcutaneous Solution Pen-injector Inject 1 mg into the skin once a week. 8/27/2024: Discontinue two weeks prior to bariatric surgery    albuterol 108 (90 Base) MCG/ACT Inhalation Aero Soln Inhale 1-2 puffs into the lungs every 4 to 6 hours as needed for Wheezing. 8/27/2024: Continue before and after bariatric surgery    losartan 25 MG Oral Tab Take 1 tablet (25 mg total) by mouth daily. 8/27/2024: Continue before and after bariatric surgery    [DISCONTINUED] ergocalciferol 1.25 MG (56074 UT) Oral Cap Take 1 capsule (50,000 Units total) by mouth once a week. (Patient not taking: Reported on 3/25/2024)     [DISCONTINUED] Ferrous Sulfate 325 (65 Fe) MG Oral Tab Take 1 tablet (325 mg total) by mouth daily.     [DISCONTINUED] montelukast 10 MG Oral Tab Take 1 tablet (10 mg total) by mouth nightly.     hydroCHLOROthiazide 25 MG Oral Tab Take 1 tablet (25 mg total) by mouth daily. 8/27/2024: Discontinue two weeks prior to bariatric surgery    albuterol (2.5 MG/3ML) 0.083% Inhalation Nebu Soln Take 3 mL (2.5 mg total) by nebulization every 4 (four) hours as needed for Wheezing or Shortness of Breath. 1/18/2024: PRN    Budesonide-Formoterol Fumarate (SYMBICORT) 160-4.5 MCG/ACT Inhalation Aerosol Inhale 2 puffs into the lungs 2 (two) times daily. Rinse mouth, gargle, and spit to follow. 8/27/2024: Continue before and after bariatric surgery     No facility-administered encounter medications on file as of 8/27/2024.           Diagnoses and all orders for this visit:    Type 2 diabetes mellitus without complication, without long-term current use of insulin ([facility])    HTN (hypertension), benign    Encounter for therapeutic drug monitoring    Morbid obesity with BMI of 50.0-59.9, adult ([facility])          Neftali Jackson MD

## 2024-08-27 NOTE — PATIENT INSTRUCTIONS
Outpatient Encounter Medications as of 8/27/2024   Medication Sig Note    dilTIAZem  MG Oral Capsule SR 24 Hr Take 1 capsule (240 mg total) by mouth daily. 8/27/2024: Continue before and after bariatric surgery    semaglutide 4 MG/3ML Subcutaneous Solution Pen-injector Inject 1 mg into the skin once a week. 8/27/2024: Discontinue two weeks prior to bariatric surgery    albuterol 108 (90 Base) MCG/ACT Inhalation Aero Soln Inhale 1-2 puffs into the lungs every 4 to 6 hours as needed for Wheezing. 8/27/2024: Continue before and after bariatric surgery    losartan 25 MG Oral Tab Take 1 tablet (25 mg total) by mouth daily. 8/27/2024: Continue before and after bariatric surgery    [DISCONTINUED] ergocalciferol 1.25 MG (45018 UT) Oral Cap Take 1 capsule (50,000 Units total) by mouth once a week. (Patient not taking: Reported on 3/25/2024)     [DISCONTINUED] Ferrous Sulfate 325 (65 Fe) MG Oral Tab Take 1 tablet (325 mg total) by mouth daily.     [DISCONTINUED] montelukast 10 MG Oral Tab Take 1 tablet (10 mg total) by mouth nightly.     hydroCHLOROthiazide 25 MG Oral Tab Take 1 tablet (25 mg total) by mouth daily. 8/27/2024: Discontinue two weeks prior to bariatric surgery    albuterol (2.5 MG/3ML) 0.083% Inhalation Nebu Soln Take 3 mL (2.5 mg total) by nebulization every 4 (four) hours as needed for Wheezing or Shortness of Breath. 1/18/2024: PRN    Budesonide-Formoterol Fumarate (SYMBICORT) 160-4.5 MCG/ACT Inhalation Aerosol Inhale 2 puffs into the lungs 2 (two) times daily. Rinse mouth, gargle, and spit to follow. 8/27/2024: Continue before and after bariatric surgery     No facility-administered encounter medications on file as of 8/27/2024.

## 2024-09-13 RX ORDER — SEMAGLUTIDE 0.68 MG/ML
0.5 INJECTION, SOLUTION SUBCUTANEOUS WEEKLY
COMMUNITY
Start: 2024-05-28 | End: 2024-09-25

## 2024-09-18 ENCOUNTER — LAB ENCOUNTER (OUTPATIENT)
Dept: LAB | Facility: HOSPITAL | Age: 44
End: 2024-09-18
Attending: SINGLE SPECIALTY
Payer: COMMERCIAL

## 2024-09-18 DIAGNOSIS — K31.9 MUCOSAL ABNORMALITY OF DUODENUM: ICD-10-CM

## 2024-09-18 DIAGNOSIS — Z01.818 PRE-OP TESTING: ICD-10-CM

## 2024-09-18 LAB
ANION GAP SERPL CALC-SCNC: 7 MMOL/L (ref 0–18)
ANTIBODY SCREEN: NEGATIVE
ATRIAL RATE: 71 BPM
BASOPHILS # BLD AUTO: 0.04 X10(3) UL (ref 0–0.2)
BASOPHILS NFR BLD AUTO: 0.5 %
BUN BLD-MCNC: 14 MG/DL (ref 9–23)
BUN/CREAT SERPL: 18.7 (ref 10–20)
CALCIUM BLD-MCNC: 9.2 MG/DL (ref 8.7–10.4)
CHLORIDE SERPL-SCNC: 107 MMOL/L (ref 98–112)
CO2 SERPL-SCNC: 26 MMOL/L (ref 21–32)
CREAT BLD-MCNC: 0.75 MG/DL
DEPRECATED RDW RBC AUTO: 42.7 FL (ref 35.1–46.3)
EGFRCR SERPLBLD CKD-EPI 2021: 100 ML/MIN/1.73M2 (ref 60–?)
EOSINOPHIL # BLD AUTO: 0.16 X10(3) UL (ref 0–0.7)
EOSINOPHIL NFR BLD AUTO: 2 %
ERYTHROCYTE [DISTWIDTH] IN BLOOD BY AUTOMATED COUNT: 14.4 % (ref 11–15)
GLUCOSE BLD-MCNC: 86 MG/DL (ref 70–99)
HCT VFR BLD AUTO: 35.7 %
HGB BLD-MCNC: 11.9 G/DL
IGA SERPL-MCNC: 217.1 MG/DL (ref 40–350)
IMM GRANULOCYTES # BLD AUTO: 0.04 X10(3) UL (ref 0–1)
IMM GRANULOCYTES NFR BLD: 0.5 %
LYMPHOCYTES # BLD AUTO: 2.21 X10(3) UL (ref 1–4)
LYMPHOCYTES NFR BLD AUTO: 27.4 %
MCH RBC QN AUTO: 27.5 PG (ref 26–34)
MCHC RBC AUTO-ENTMCNC: 33.3 G/DL (ref 31–37)
MCV RBC AUTO: 82.6 FL
MONOCYTES # BLD AUTO: 0.43 X10(3) UL (ref 0.1–1)
MONOCYTES NFR BLD AUTO: 5.3 %
NEUTROPHILS # BLD AUTO: 5.19 X10 (3) UL (ref 1.5–7.7)
NEUTROPHILS # BLD AUTO: 5.19 X10(3) UL (ref 1.5–7.7)
NEUTROPHILS NFR BLD AUTO: 64.3 %
OSMOLALITY SERPL CALC.SUM OF ELEC: 290 MOSM/KG (ref 275–295)
P AXIS: 25 DEGREES
P-R INTERVAL: 182 MS
PLATELET # BLD AUTO: 234 10(3)UL (ref 150–450)
POTASSIUM SERPL-SCNC: 4 MMOL/L (ref 3.5–5.1)
Q-T INTERVAL: 406 MS
QRS DURATION: 96 MS
QTC CALCULATION (BEZET): 441 MS
R AXIS: 2 DEGREES
RBC # BLD AUTO: 4.32 X10(6)UL
RH BLOOD TYPE: POSITIVE
SODIUM SERPL-SCNC: 140 MMOL/L (ref 136–145)
T AXIS: 30 DEGREES
VENTRICULAR RATE: 71 BPM
WBC # BLD AUTO: 8.1 X10(3) UL (ref 4–11)

## 2024-09-18 PROCEDURE — 86900 BLOOD TYPING SEROLOGIC ABO: CPT

## 2024-09-18 PROCEDURE — 80048 BASIC METABOLIC PNL TOTAL CA: CPT

## 2024-09-18 PROCEDURE — 93010 ELECTROCARDIOGRAM REPORT: CPT | Performed by: INTERNAL MEDICINE

## 2024-09-18 PROCEDURE — 82784 ASSAY IGA/IGD/IGG/IGM EACH: CPT

## 2024-09-18 PROCEDURE — 36415 COLL VENOUS BLD VENIPUNCTURE: CPT

## 2024-09-18 PROCEDURE — 86364 TISS TRNSGLTMNASE EA IG CLAS: CPT

## 2024-09-18 PROCEDURE — 86901 BLOOD TYPING SEROLOGIC RH(D): CPT

## 2024-09-18 PROCEDURE — 85025 COMPLETE CBC W/AUTO DIFF WBC: CPT

## 2024-09-18 PROCEDURE — 93005 ELECTROCARDIOGRAM TRACING: CPT

## 2024-09-18 PROCEDURE — 86850 RBC ANTIBODY SCREEN: CPT

## 2024-09-19 LAB — TTG IGA SER-ACNC: 0.4 U/ML (ref ?–7)

## 2024-09-23 ENCOUNTER — HOSPITAL ENCOUNTER (INPATIENT)
Facility: HOSPITAL | Age: 44
LOS: 2 days | Discharge: HOME | End: 2024-09-25
Attending: SINGLE SPECIALTY | Admitting: SINGLE SPECIALTY
Payer: COMMERCIAL

## 2024-09-23 ENCOUNTER — ANESTHESIA EVENT (OUTPATIENT)
Dept: SURGERY | Facility: HOSPITAL | Age: 44
End: 2024-09-23
Payer: COMMERCIAL

## 2024-09-23 ENCOUNTER — HOSPITAL ENCOUNTER (INPATIENT)
Facility: HOSPITAL | Age: 44
LOS: 2 days | Discharge: HOME | DRG: 621 | End: 2024-09-25
Attending: SINGLE SPECIALTY | Admitting: SINGLE SPECIALTY
Payer: COMMERCIAL

## 2024-09-23 ENCOUNTER — ANESTHESIA (OUTPATIENT)
Dept: SURGERY | Facility: HOSPITAL | Age: 44
End: 2024-09-23
Payer: COMMERCIAL

## 2024-09-23 DIAGNOSIS — E66.01 MORBID (SEVERE) OBESITY DUE TO EXCESS CALORIES (HCC): ICD-10-CM

## 2024-09-23 DIAGNOSIS — Z01.818 PRE-OP TESTING: Primary | ICD-10-CM

## 2024-09-23 DIAGNOSIS — E11.9 TYPE 2 DIABETES MELLITUS WITHOUT COMPLICATION, WITHOUT LONG-TERM CURRENT USE OF INSULIN (HCC): ICD-10-CM

## 2024-09-23 LAB
B-HCG UR QL: NEGATIVE
GLUCOSE BLDC GLUCOMTR-MCNC: 158 MG/DL (ref 70–99)
GLUCOSE BLDC GLUCOMTR-MCNC: 176 MG/DL (ref 70–99)

## 2024-09-23 PROCEDURE — 3E0T3BZ INTRODUCTION OF ANESTHETIC AGENT INTO PERIPHERAL NERVES AND PLEXI, PERCUTANEOUS APPROACH: ICD-10-PCS | Performed by: SINGLE SPECIALTY

## 2024-09-23 PROCEDURE — 99222 1ST HOSP IP/OBS MODERATE 55: CPT | Performed by: INTERNAL MEDICINE

## 2024-09-23 PROCEDURE — 0DB64Z3 EXCISION OF STOMACH, PERCUTANEOUS ENDOSCOPIC APPROACH, VERTICAL: ICD-10-PCS | Performed by: SINGLE SPECIALTY

## 2024-09-23 DEVICE — STAPLER INT 60 UNIV BLK W/ TRI-STPL: Type: IMPLANTABLE DEVICE | Site: ABDOMEN | Status: FUNCTIONAL

## 2024-09-23 DEVICE — STAPLER INT 60 UNIV PUR W/ TRI-STPL: Type: IMPLANTABLE DEVICE | Site: ABDOMEN | Status: FUNCTIONAL

## 2024-09-23 RX ORDER — ALBUTEROL SULFATE 90 UG/1
INHALANT RESPIRATORY (INHALATION) AS NEEDED
Status: DISCONTINUED | OUTPATIENT
Start: 2024-09-23 | End: 2024-09-23 | Stop reason: SURG

## 2024-09-23 RX ORDER — KETOROLAC TROMETHAMINE 30 MG/ML
INJECTION, SOLUTION INTRAMUSCULAR; INTRAVENOUS AS NEEDED
Status: DISCONTINUED | OUTPATIENT
Start: 2024-09-23 | End: 2024-09-23 | Stop reason: SURG

## 2024-09-23 RX ORDER — GABAPENTIN 300 MG/1
300 CAPSULE ORAL ONCE
Status: COMPLETED | OUTPATIENT
Start: 2024-09-23 | End: 2024-09-23

## 2024-09-23 RX ORDER — ONDANSETRON 2 MG/ML
INJECTION INTRAMUSCULAR; INTRAVENOUS AS NEEDED
Status: DISCONTINUED | OUTPATIENT
Start: 2024-09-23 | End: 2024-09-23 | Stop reason: SURG

## 2024-09-23 RX ORDER — NALOXONE HYDROCHLORIDE 0.4 MG/ML
0.08 INJECTION, SOLUTION INTRAMUSCULAR; INTRAVENOUS; SUBCUTANEOUS AS NEEDED
Status: DISCONTINUED | OUTPATIENT
Start: 2024-09-23 | End: 2024-09-23 | Stop reason: HOSPADM

## 2024-09-23 RX ORDER — SODIUM CHLORIDE, SODIUM LACTATE, POTASSIUM CHLORIDE, CALCIUM CHLORIDE 600; 310; 30; 20 MG/100ML; MG/100ML; MG/100ML; MG/100ML
INJECTION, SOLUTION INTRAVENOUS CONTINUOUS
Status: DISCONTINUED | OUTPATIENT
Start: 2024-09-23 | End: 2024-09-25

## 2024-09-23 RX ORDER — NICOTINE POLACRILEX 4 MG
30 LOZENGE BUCCAL
Status: DISCONTINUED | OUTPATIENT
Start: 2024-09-23 | End: 2024-09-23 | Stop reason: HOSPADM

## 2024-09-23 RX ORDER — OXYCODONE HYDROCHLORIDE 5 MG/1
5 TABLET ORAL EVERY 6 HOURS PRN
Status: DISCONTINUED | OUTPATIENT
Start: 2024-09-23 | End: 2024-09-25

## 2024-09-23 RX ORDER — HYDROMORPHONE HYDROCHLORIDE 1 MG/ML
0.6 INJECTION, SOLUTION INTRAMUSCULAR; INTRAVENOUS; SUBCUTANEOUS EVERY 5 MIN PRN
Status: DISCONTINUED | OUTPATIENT
Start: 2024-09-23 | End: 2024-09-23 | Stop reason: HOSPADM

## 2024-09-23 RX ORDER — LIDOCAINE HYDROCHLORIDE 10 MG/ML
INJECTION, SOLUTION EPIDURAL; INFILTRATION; INTRACAUDAL; PERINEURAL AS NEEDED
Status: DISCONTINUED | OUTPATIENT
Start: 2024-09-23 | End: 2024-09-23 | Stop reason: SURG

## 2024-09-23 RX ORDER — FAMOTIDINE 20 MG/1
20 TABLET, FILM COATED ORAL ONCE
Status: COMPLETED | OUTPATIENT
Start: 2024-09-23 | End: 2024-09-23

## 2024-09-23 RX ORDER — KETOROLAC TROMETHAMINE 30 MG/ML
30 INJECTION, SOLUTION INTRAMUSCULAR; INTRAVENOUS EVERY 6 HOURS
Status: COMPLETED | OUTPATIENT
Start: 2024-09-23 | End: 2024-09-25

## 2024-09-23 RX ORDER — ACETAMINOPHEN 160 MG/5ML
1000 SOLUTION ORAL EVERY 8 HOURS
Status: DISCONTINUED | OUTPATIENT
Start: 2024-09-23 | End: 2024-09-24

## 2024-09-23 RX ORDER — PHENYLEPHRINE HCL 10 MG/ML
VIAL (ML) INJECTION AS NEEDED
Status: DISCONTINUED | OUTPATIENT
Start: 2024-09-23 | End: 2024-09-23 | Stop reason: SURG

## 2024-09-23 RX ORDER — HEPARIN SODIUM 5000 [USP'U]/ML
5000 INJECTION, SOLUTION INTRAVENOUS; SUBCUTANEOUS ONCE
Status: COMPLETED | OUTPATIENT
Start: 2024-09-23 | End: 2024-09-23

## 2024-09-23 RX ORDER — GLYCOPYRROLATE 0.2 MG/ML
INJECTION, SOLUTION INTRAMUSCULAR; INTRAVENOUS AS NEEDED
Status: DISCONTINUED | OUTPATIENT
Start: 2024-09-23 | End: 2024-09-23 | Stop reason: SURG

## 2024-09-23 RX ORDER — MORPHINE SULFATE 2 MG/ML
1 INJECTION, SOLUTION INTRAMUSCULAR; INTRAVENOUS EVERY 2 HOUR PRN
Status: DISCONTINUED | OUTPATIENT
Start: 2024-09-23 | End: 2024-09-25

## 2024-09-23 RX ORDER — ENOXAPARIN SODIUM 100 MG/ML
40 INJECTION SUBCUTANEOUS DAILY
Status: DISCONTINUED | OUTPATIENT
Start: 2024-09-24 | End: 2024-09-25

## 2024-09-23 RX ORDER — HYDRALAZINE HYDROCHLORIDE 20 MG/ML
10 INJECTION INTRAMUSCULAR; INTRAVENOUS EVERY 6 HOURS PRN
Status: DISCONTINUED | OUTPATIENT
Start: 2024-09-23 | End: 2024-09-25

## 2024-09-23 RX ORDER — PROCHLORPERAZINE EDISYLATE 5 MG/ML
5 INJECTION INTRAMUSCULAR; INTRAVENOUS EVERY 8 HOURS PRN
Status: DISCONTINUED | OUTPATIENT
Start: 2024-09-23 | End: 2024-09-23 | Stop reason: HOSPADM

## 2024-09-23 RX ORDER — FAMOTIDINE 10 MG/ML
20 INJECTION, SOLUTION INTRAVENOUS ONCE
Status: COMPLETED | OUTPATIENT
Start: 2024-09-23 | End: 2024-09-23

## 2024-09-23 RX ORDER — MORPHINE SULFATE 4 MG/ML
4 INJECTION, SOLUTION INTRAMUSCULAR; INTRAVENOUS EVERY 2 HOUR PRN
Status: DISCONTINUED | OUTPATIENT
Start: 2024-09-23 | End: 2024-09-25

## 2024-09-23 RX ORDER — DILTIAZEM HYDROCHLORIDE 120 MG/1
240 CAPSULE, EXTENDED RELEASE ORAL DAILY
Status: DISCONTINUED | OUTPATIENT
Start: 2024-09-24 | End: 2024-09-24

## 2024-09-23 RX ORDER — ACETAMINOPHEN 500 MG
1000 TABLET ORAL ONCE
Status: COMPLETED | OUTPATIENT
Start: 2024-09-23 | End: 2024-09-23

## 2024-09-23 RX ORDER — NICOTINE POLACRILEX 4 MG
15 LOZENGE BUCCAL
Status: DISCONTINUED | OUTPATIENT
Start: 2024-09-23 | End: 2024-09-23 | Stop reason: HOSPADM

## 2024-09-23 RX ORDER — METOCLOPRAMIDE HYDROCHLORIDE 5 MG/ML
10 INJECTION INTRAMUSCULAR; INTRAVENOUS ONCE
Status: COMPLETED | OUTPATIENT
Start: 2024-09-23 | End: 2024-09-23

## 2024-09-23 RX ORDER — SCOLOPAMINE TRANSDERMAL SYSTEM 1 MG/1
1 PATCH, EXTENDED RELEASE TRANSDERMAL ONCE
Status: DISCONTINUED | OUTPATIENT
Start: 2024-09-23 | End: 2024-09-25

## 2024-09-23 RX ORDER — HYDROMORPHONE HYDROCHLORIDE 1 MG/ML
0.2 INJECTION, SOLUTION INTRAMUSCULAR; INTRAVENOUS; SUBCUTANEOUS EVERY 5 MIN PRN
Status: DISCONTINUED | OUTPATIENT
Start: 2024-09-23 | End: 2024-09-23 | Stop reason: HOSPADM

## 2024-09-23 RX ORDER — HYDROMORPHONE HYDROCHLORIDE 1 MG/ML
0.4 INJECTION, SOLUTION INTRAMUSCULAR; INTRAVENOUS; SUBCUTANEOUS EVERY 5 MIN PRN
Status: DISCONTINUED | OUTPATIENT
Start: 2024-09-23 | End: 2024-09-23 | Stop reason: HOSPADM

## 2024-09-23 RX ORDER — MORPHINE SULFATE 10 MG/ML
6 INJECTION, SOLUTION INTRAMUSCULAR; INTRAVENOUS EVERY 10 MIN PRN
Status: DISCONTINUED | OUTPATIENT
Start: 2024-09-23 | End: 2024-09-23 | Stop reason: HOSPADM

## 2024-09-23 RX ORDER — MORPHINE SULFATE 4 MG/ML
4 INJECTION, SOLUTION INTRAMUSCULAR; INTRAVENOUS EVERY 10 MIN PRN
Status: DISCONTINUED | OUTPATIENT
Start: 2024-09-23 | End: 2024-09-23 | Stop reason: HOSPADM

## 2024-09-23 RX ORDER — BUPIVACAINE HYDROCHLORIDE AND EPINEPHRINE 2.5; 5 MG/ML; UG/ML
INJECTION, SOLUTION INFILTRATION; PERINEURAL AS NEEDED
Status: DISCONTINUED | OUTPATIENT
Start: 2024-09-23 | End: 2024-09-23 | Stop reason: HOSPADM

## 2024-09-23 RX ORDER — LOSARTAN POTASSIUM 25 MG/1
25 TABLET ORAL EVERY 24 HOURS
Status: DISCONTINUED | OUTPATIENT
Start: 2024-09-23 | End: 2024-09-24

## 2024-09-23 RX ORDER — PANTOPRAZOLE SODIUM 40 MG/1
40 TABLET, DELAYED RELEASE ORAL
Status: DISCONTINUED | OUTPATIENT
Start: 2024-09-23 | End: 2024-09-25

## 2024-09-23 RX ORDER — ONDANSETRON 2 MG/ML
4 INJECTION INTRAMUSCULAR; INTRAVENOUS EVERY 6 HOURS PRN
Status: DISCONTINUED | OUTPATIENT
Start: 2024-09-23 | End: 2024-09-23 | Stop reason: HOSPADM

## 2024-09-23 RX ORDER — CEFAZOLIN SODIUM IN 0.9 % NACL 3 G/100 ML
3 INTRAVENOUS SOLUTION, PIGGYBACK (ML) INTRAVENOUS ONCE
Status: COMPLETED | OUTPATIENT
Start: 2024-09-23 | End: 2024-09-23

## 2024-09-23 RX ORDER — MORPHINE SULFATE 4 MG/ML
2 INJECTION, SOLUTION INTRAMUSCULAR; INTRAVENOUS EVERY 10 MIN PRN
Status: DISCONTINUED | OUTPATIENT
Start: 2024-09-23 | End: 2024-09-23 | Stop reason: HOSPADM

## 2024-09-23 RX ORDER — SODIUM CHLORIDE, SODIUM LACTATE, POTASSIUM CHLORIDE, CALCIUM CHLORIDE 600; 310; 30; 20 MG/100ML; MG/100ML; MG/100ML; MG/100ML
INJECTION, SOLUTION INTRAVENOUS CONTINUOUS
Status: DISCONTINUED | OUTPATIENT
Start: 2024-09-23 | End: 2024-09-23 | Stop reason: HOSPADM

## 2024-09-23 RX ORDER — ALBUTEROL SULFATE 90 UG/1
1-2 INHALANT RESPIRATORY (INHALATION) EVERY 4 HOURS PRN
Status: DISCONTINUED | OUTPATIENT
Start: 2024-09-23 | End: 2024-09-25

## 2024-09-23 RX ORDER — MORPHINE SULFATE 2 MG/ML
2 INJECTION, SOLUTION INTRAMUSCULAR; INTRAVENOUS EVERY 2 HOUR PRN
Status: DISCONTINUED | OUTPATIENT
Start: 2024-09-23 | End: 2024-09-25

## 2024-09-23 RX ORDER — METOCLOPRAMIDE 10 MG/1
10 TABLET ORAL ONCE
Status: COMPLETED | OUTPATIENT
Start: 2024-09-23 | End: 2024-09-23

## 2024-09-23 RX ORDER — ONDANSETRON 2 MG/ML
4 INJECTION INTRAMUSCULAR; INTRAVENOUS EVERY 6 HOURS SCHEDULED
Status: DISCONTINUED | OUTPATIENT
Start: 2024-09-23 | End: 2024-09-25

## 2024-09-23 RX ORDER — DEXAMETHASONE SODIUM PHOSPHATE 4 MG/ML
VIAL (ML) INJECTION AS NEEDED
Status: DISCONTINUED | OUTPATIENT
Start: 2024-09-23 | End: 2024-09-23 | Stop reason: SURG

## 2024-09-23 RX ORDER — DEXTROSE MONOHYDRATE 25 G/50ML
50 INJECTION, SOLUTION INTRAVENOUS
Status: DISCONTINUED | OUTPATIENT
Start: 2024-09-23 | End: 2024-09-23 | Stop reason: HOSPADM

## 2024-09-23 RX ORDER — ROCURONIUM BROMIDE 10 MG/ML
INJECTION, SOLUTION INTRAVENOUS AS NEEDED
Status: DISCONTINUED | OUTPATIENT
Start: 2024-09-23 | End: 2024-09-23 | Stop reason: SURG

## 2024-09-23 RX ADMIN — GLYCOPYRROLATE 0.1 MG: 0.2 INJECTION, SOLUTION INTRAMUSCULAR; INTRAVENOUS at 07:39:00

## 2024-09-23 RX ADMIN — ROCURONIUM BROMIDE 5 MG: 10 INJECTION, SOLUTION INTRAVENOUS at 07:48:00

## 2024-09-23 RX ADMIN — SODIUM CHLORIDE, SODIUM LACTATE, POTASSIUM CHLORIDE, CALCIUM CHLORIDE: 600; 310; 30; 20 INJECTION, SOLUTION INTRAVENOUS at 07:39:00

## 2024-09-23 RX ADMIN — KETOROLAC TROMETHAMINE 30 MG: 30 INJECTION, SOLUTION INTRAMUSCULAR; INTRAVENOUS at 08:29:00

## 2024-09-23 RX ADMIN — LIDOCAINE HYDROCHLORIDE 25 MG: 10 INJECTION, SOLUTION EPIDURAL; INFILTRATION; INTRACAUDAL; PERINEURAL at 07:48:00

## 2024-09-23 RX ADMIN — PHENYLEPHRINE HCL 50 MCG: 10 MG/ML VIAL (ML) INJECTION at 08:02:00

## 2024-09-23 RX ADMIN — ONDANSETRON 4 MG: 2 INJECTION INTRAMUSCULAR; INTRAVENOUS at 08:29:00

## 2024-09-23 RX ADMIN — SODIUM CHLORIDE, SODIUM LACTATE, POTASSIUM CHLORIDE, CALCIUM CHLORIDE: 600; 310; 30; 20 INJECTION, SOLUTION INTRAVENOUS at 08:32:00

## 2024-09-23 RX ADMIN — ROCURONIUM BROMIDE 20 MG: 10 INJECTION, SOLUTION INTRAVENOUS at 07:56:00

## 2024-09-23 RX ADMIN — PHENYLEPHRINE HCL 100 MCG: 10 MG/ML VIAL (ML) INJECTION at 08:07:00

## 2024-09-23 RX ADMIN — DEXAMETHASONE SODIUM PHOSPHATE 6 MG: 4 MG/ML VIAL (ML) INJECTION at 07:56:00

## 2024-09-23 RX ADMIN — ALBUTEROL SULFATE 2 PUFF: 90 INHALANT RESPIRATORY (INHALATION) at 07:39:00

## 2024-09-23 RX ADMIN — CEFAZOLIN SODIUM IN 0.9 % NACL 3 G: 3 G/100 ML INTRAVENOUS SOLUTION, PIGGYBACK (ML) INTRAVENOUS at 07:39:00

## 2024-09-23 RX ADMIN — SODIUM CHLORIDE, SODIUM LACTATE, POTASSIUM CHLORIDE, CALCIUM CHLORIDE: 600; 310; 30; 20 INJECTION, SOLUTION INTRAVENOUS at 08:17:00

## 2024-09-23 RX ADMIN — LIDOCAINE HYDROCHLORIDE 25 MG: 10 INJECTION, SOLUTION EPIDURAL; INFILTRATION; INTRACAUDAL; PERINEURAL at 07:43:00

## 2024-09-23 NOTE — H&P
[facility] / [facility]  Marcos Webster / Sandra / Antonio / Kami / Mohit  Office - [phone]  Answering Service [phone]      Yareli Lynch Patient Status:  Inpatient    3/23/1980 MRN [id number]   Location [facility] PRE OP RECOVERY Attending Jarek Rueda MD   Hosp Day # 0 PCP Yo Winchester,      Subjective:  The patient is a 44 year old obese female admitted for laparoscopic gastric sleeve surgery.  Body mass index is 50.3 kg/m².         Patient Active Problem List    Diagnosis Date Noted    Moderate persistent asthma without complication ([facility]) 2024    Moderate persistent asthma with acute exacerbation ([facility]) 2024    History of non-ST elevation myocardial infarction (NSTEMI) 2024    Type 2 diabetes mellitus without complication, without long-term current use of insulin ([facility]) 2023    Encounter for therapeutic drug monitoring 2023    Morbid obesity with BMI of 50.0-59.9, adult ([facility]) 2022    HTN (hypertension), benign 2022    DVT (deep vein thrombosis) in pregnancy ([facility]) 2021    IBS (irritable bowel syndrome) 06/10/2019    PCOS (polycystic ovarian syndrome) 01/10/2019     Past Medical History:    Allergic rhinitis    Anxiety    Asthma ([facility])    DVT (deep vein thrombosis) in pregnancy ([facility])    Encounter for therapeutic drug monitoring    Essential hypertension    Gestational diabetes ([facility])    High blood pressure    High myopia    History of IBS    History of PCOS    Hx of motion sickness    Hypertension    IBS (irritable bowel syndrome)    Infertility, female    Morbid obesity with BMI of 45.0-49.9, adult ([facility])    Obesity    Prediabetes    PTSD (post-traumatic stress disorder)    Sleep apnea    Visual impairment    wers glasses      Past Surgical History:   Procedure Laterality Date      2019, 2021    Colonoscopy      Colonoscopy  2024    ; colon polyps, hemorrhoids     Colonoscopy N/A 2024    Procedure: COLONOSCOPY;  Surgeon: Olive Ny MD;  Location: [facility] ENDOSCOPY    Egd  2024    ; gastric polyps    Ivf package  2018    Other surgical history      egg retrieval for IVF      Medications Prior to Admission   Medication Sig Dispense Refill Last Dose    dilTIAZem  MG Oral Capsule SR 24 Hr Take 1 capsule (240 mg total) by mouth daily. 90 capsule 3 2024    losartan 25 MG Oral Tab Take 1 tablet (25 mg total) by mouth daily. 90 tablet 0 2024    OZEMPIC, 0.25 OR 0.5 MG/DOSE, 2 MG/3ML Subcutaneous Solution Pen-injector Inject 0.5 mg into the skin once a week.   2024    [] aprepitant (EMEND) 40 MG Oral Cap Take 1 capsule (40 mg total) by mouth one time for 1 dose. 3-4 hours before surgery 1 capsule 0     albuterol 108 (90 Base) MCG/ACT Inhalation Aero Soln Inhale 1-2 puffs into the lungs every 4 to 6 hours as needed for Wheezing. 18 g 3 9/10/2024    hydroCHLOROthiazide 25 MG Oral Tab Take 1 tablet (25 mg total) by mouth daily. 90 tablet 0 2024    albuterol (2.5 MG/3ML) 0.083% Inhalation Nebu Soln Take 3 mL (2.5 mg total) by nebulization every 4 (four) hours as needed for Wheezing or Shortness of Breath. 120 mL 0 Unknown     Allergies   Allergen Reactions    Lisinopril Coughing and UNKNOWN      Social History     Tobacco Use    Smoking status: Former    Smokeless tobacco: Never    Tobacco comments:     On and off   Substance Use Topics    Alcohol use: No      Family History   Problem Relation Age of Onset    Thyroid Disorder Father     Diabetes Father     Diabetes Mother     Hypertension Mother     No Known Problems Maternal Grandmother     No Known Problems Maternal Grandfather     Glaucoma Neg     Retinal detachment Neg     Macular degeneration Neg       Review of Systems      Constitutional: negative  HEENT: negative  Respiratory: negative  Cardiovascular: negative  Gastrointestinal: negative  Genitourinary:  negative  Musculoskeletal: negative  Neurological: negative  Psychological: negative  Endocrine: negative  Immunologic: negative  Integumentary: negative      Objective:  Vital signs in last 24 hours:  Temp:  [97.8 °F (36.6 °C)] 97.8 °F (36.6 °C)  Pulse:  [77] 77  Resp:  [18] 18  BP: (144)/(63) 144/63  SpO2:  [96 %] 96 %    General: no acute distress, well-nourished  HENT: normocephalic, atraumatic  Lung: breathing comfortably, symmetrical expansion, clear to ausculation bilaterally, no wheezing  Heart: regular rate and rhythm, no murmur detected  Abdomen: soft, obese, non-tender, non-distended  Extremities: normal strength, normal ROM  Neuro: no focal deficits, cranial nerves grossly intact  Psych: alert and oriented, normal affect  Skin: warm, dry    Data Review: CBC:   Lab Results   Component Value Date    WBC 8.1 09/18/2024    RBC 4.32 09/18/2024     BMP:   Lab Results   Component Value Date    CO2 26.0 09/18/2024    BUN 14 09/18/2024       Assessment/Plan:  Morbid obesity with failure of conservative therapy. Pt has opted for sleeve gastrectomy.    The patient was informed that risks include, but are not limited to: death, leak, obstruction, bleeding, and sepsis. Any of these could require further surgery. Other risks include DVT, PE, pneumonia, wound dehiscence, hernia, wound infection, the need for dilatations, and the inability to lose appropriate weight and keep it off.     We discussed that our goal is to ameliorate her medical problems and not to obtain a specific body mass index. She understands the risks and benefits and wishes to proceed with the procedure. She has signed a consent form.     Date of Surgery Update (To be completed by Attending Surgeon day of surgery.)   There have been no significant clinical changes since the completion of the above H&P.

## 2024-09-23 NOTE — CONSULTS
[facility]  part of [facility]  Consult Note       Yareli Lynch Patient Status:  Inpatient    3/23/1980  44 year old CSN [id number]   Location 425/425-A Attending Jarek Rueda MD     PCP Yo Winchester,          DATE OF ADMISSION: 2024     CHIEF COMPLAINT: Preop    HISTORY OF PRESENT ILLNESS  This is a 44 year oldfemale with history of obesity who underwent a laparoscopic gastric sleeve on . Patient was admitted post operatively for closer monitoring and care. At time of interview, patient reported having some pain, mostly in left upper quadrant.  Associated with nausea, but denied emesis.  Patient otherwise denied CP, SOB, F/C    PAST MEDICAL HISTORY  Past Medical History:    Allergic rhinitis    Anxiety    Asthma (HCC)    DVT (deep vein thrombosis) in pregnancy ([facility])    Encounter for therapeutic drug monitoring    Essential hypertension    Gestational diabetes (HCC)    High blood pressure    High myopia    History of IBS    History of PCOS    Hx of motion sickness    Hypertension    IBS (irritable bowel syndrome)    Infertility, female    Morbid obesity with BMI of 45.0-49.9, adult (Former[facility])    Obesity    Prediabetes    PTSD (post-traumatic stress disorder)    Sleep apnea    Visual impairment    wers glasses        PAST SURGICAL HISTORY  Past Surgical History:   Procedure Laterality Date      2019, 2021    Colonoscopy  2018    Colonoscopy  2024    ; colon polyps, hemorrhoids    Colonoscopy N/A 2024    Procedure: COLONOSCOPY;  Surgeon: Olive Ny MD;  Location: [facility] ENDOSCOPY    Egd  2024    ; gastric polyps    Ivf package  2018    Other surgical history      egg retrieval for IVF       ALLERGIES   Lisinopril    MEDICATIONS  Current Discharge Medication List        CONTINUE these medications which have NOT CHANGED    Details   dilTIAZem  MG Oral Capsule SR 24 Hr Take 1 capsule (240 mg total)  by mouth daily.  Qty: 90 capsule, Refills: 3      losartan 25 MG Oral Tab Take 1 tablet (25 mg total) by mouth daily.  Qty: 90 tablet, Refills: 0      OZEMPIC, 0.25 OR 0.5 MG/DOSE, 2 MG/3ML Subcutaneous Solution Pen-injector Inject 0.5 mg into the skin once a week.      albuterol 108 (90 Base) MCG/ACT Inhalation Aero Soln Inhale 1-2 puffs into the lungs every 4 to 6 hours as needed for Wheezing.  Qty: 18 g, Refills: 3    Associated Diagnoses: Subacute cough      hydroCHLOROthiazide 25 MG Oral Tab Take 1 tablet (25 mg total) by mouth daily.  Qty: 90 tablet, Refills: 0      albuterol (2.5 MG/3ML) 0.083% Inhalation Nebu Soln Take 3 mL (2.5 mg total) by nebulization every 4 (four) hours as needed for Wheezing or Shortness of Breath.  Qty: 120 mL, Refills: 0    Associated Diagnoses: Moderate persistent reactive airway disease with acute exacerbation (HCC)           STOP taking these medications       aprepitant (EMEND) 40 MG Oral Cap Comments:   Reason for Stopping:               SOCIAL HISTORY  Social History     Socioeconomic History    Marital status:    Occupational History    Occupation: health    Tobacco Use    Smoking status: Former    Smokeless tobacco: Never    Tobacco comments:     On and off   Vaping Use    Vaping status: Never Used   Substance and Sexual Activity    Alcohol use: No    Drug use: No    Sexual activity: Yes     Partners: Female     Comment: with wife    Other Topics Concern    Blood Transfusions No       FAMILY HISTORY  Family History   Problem Relation Age of Onset    Thyroid Disorder Father     Diabetes Father     Diabetes Mother     Hypertension Mother     No Known Problems Maternal Grandmother     No Known Problems Maternal Grandfather     Glaucoma Neg     Retinal detachment Neg     Macular degeneration Neg        PHYSICAL EXAM  Vital signs:  /74 (BP Location: Left arm)   Pulse 80   Temp 97.7 °F (36.5 °C) (Oral)   Resp 16   Ht 5' 2\" (1.575 m)   Wt 275 lb (124.7 kg)    LMP 08/26/2024   SpO2 94%   BMI 50.30 kg/m²      GENERAL:  Awake and alert, in no acute distress.  HEART:  Regular rhythm.  Regular rate   LUNGS:  Air entry was good.  No crackles or wheezes   ABDOMEN: Soft and tender, worse in left upper quadrant.  Incisions clean dry and intact  PSYCHIATRIC: Normal mood      IMAGING  No results found.    Data:  Recent Labs   Lab 09/18/24  1427   RBC 4.32   HGB 11.9*   HCT 35.7   MCV 82.6   MCH 27.5   MCHC 33.3   RDW 14.4   NEPRELIM 5.19   WBC 8.1   .0     Recent Labs   Lab 09/18/24  1427   GLU 86   BUN 14   CREATSERUM 0.75   CA 9.2      K 4.0      CO2 26.0       ASSESSMENT/PLAN      Morbid (severe) obesity due to excess calories (HCC)  -Status post laparoscopic sleeve gastrectomy on 9/23.  - cont pain control, close monitoring with IV narcotics  - Encourage Incentive spirometry  -Started on liquid challenge per surgery.  - F/u further bariatric surgery recs    HTN  -Mild elevations noted   - Restart home regimen  - Monitor and adjust as needed        Plan of care discussed with patient and family at bedside.  Discussed with  RN.      Avila St MD    This note was prepared using Dragon Medical voice recognition dictation software. As a result errors may occur. When identified these errors have been corrected. While every attempt is made to correct errors during dictation discrepancies may still exist

## 2024-09-23 NOTE — PLAN OF CARE
Received patient from PACU, awake and alert. Family at bedside. Continent of bowel and bladder. Activity as tolerated. No distress note, remote tele in place. Pain to abd, will continue pain regimen. Ice to abd, surgical sites are CDI. Nausea noted after movement from PACU, scope patch in place. Zofran scheduled. IV fluids continued. Patient educated on oral hydration and bariatric menu. Call light is within reach. Plan for discharge home tomorrow if cleared with wife and children.       Problem: Patient Centered Care  Goal: Patient preferences are identified and integrated in the patient's plan of care  Description: Interventions:  - What would you like us to know as we care for you?   - Provide timely, complete, and accurate information to patient/family  - Incorporate patient and family knowledge, values, beliefs, and cultural backgrounds into the planning and delivery of care  - Encourage patient/family to participate in care and decision-making at the level they choose  - Honor patient and family perspectives and choices  Outcome: Progressing     Problem: Patient/Family Goals  Goal: Patient/Family Long Term Goal  Description: Patient's Long Term Goal:     Interventions:  -   - See additional Care Plan goals for specific interventions  Outcome: Progressing  Goal: Patient/Family Short Term Goal  Description: Patient's Short Term Goal:     Interventions:   -   - See additional Care Plan goals for specific interventions  Outcome: Progressing

## 2024-09-23 NOTE — ANESTHESIA PREPROCEDURE EVALUATION
Anesthesia PreOp Note    HPI:     Yareli Lynch is a 44 year old female who presents for preoperative consultation requested by: Jarek Rueda MD    Date of Surgery: 2024    Procedure(s):  Laparoscopic, possible open, gastric sleeve  Indication: Obesity, BMI, HtN, PCOS, YAMILE, DM, FE def anemia    Relevant Problems   No relevant active problems       NPO:  Last Liquid Consumption Date: 24     Last Solid Consumption Date: 24  Last Solid Consumption Time: 1800  Last Liquid Consumption Date: 24          History Review:  Patient Active Problem List    Diagnosis Date Noted    Moderate persistent asthma without complication ([facility]) 2024    Moderate persistent asthma with acute exacerbation ([facility]) 2024    History of non-ST elevation myocardial infarction (NSTEMI) 2024    Type 2 diabetes mellitus without complication, without long-term current use of insulin ([facility]) 2023    Encounter for therapeutic drug monitoring 2023    Morbid obesity with BMI of 50.0-59.9, adult ([facility]) 2022    HTN (hypertension), benign 2022    DVT (deep vein thrombosis) in pregnancy ([facility]) 2021    IBS (irritable bowel syndrome) 06/10/2019    PCOS (polycystic ovarian syndrome) 01/10/2019       Past Medical History:    Allergic rhinitis    Anxiety    Asthma ([facility])    DVT (deep vein thrombosis) in pregnancy ([facility])    Encounter for therapeutic drug monitoring    Essential hypertension    Gestational diabetes ([facility])    High blood pressure    High myopia    History of IBS    History of PCOS    Hx of motion sickness    Hypertension    IBS (irritable bowel syndrome)    Infertility, female    Morbid obesity with BMI of 45.0-49.9, adult ([facility])    Obesity    Prediabetes    PTSD (post-traumatic stress disorder)    Sleep apnea    Visual impairment    wers glasses       Past Surgical History:   Procedure Laterality Date      2019, 2021    Colonoscopy  2018    Colonoscopy  2024     ; colon polyps, hemorrhoids    Colonoscopy N/A 2024    Procedure: COLONOSCOPY;  Surgeon: Olive Ny MD;  Location: [facility] ENDOSCOPY    Egd  2024    ; gastric polyps    Ivf package  2018    Other surgical history      egg retrieval for IVF       Medications Prior to Admission   Medication Sig Dispense Refill Last Dose    dilTIAZem  MG Oral Capsule SR 24 Hr Take 1 capsule (240 mg total) by mouth daily. 90 capsule 3 2024    losartan 25 MG Oral Tab Take 1 tablet (25 mg total) by mouth daily. 90 tablet 0 2024    OZEMPIC, 0.25 OR 0.5 MG/DOSE, 2 MG/3ML Subcutaneous Solution Pen-injector Inject 0.5 mg into the skin once a week.   2024    [] aprepitant (EMEND) 40 MG Oral Cap Take 1 capsule (40 mg total) by mouth one time for 1 dose. 3-4 hours before surgery 1 capsule 0     albuterol 108 (90 Base) MCG/ACT Inhalation Aero Soln Inhale 1-2 puffs into the lungs every 4 to 6 hours as needed for Wheezing. 18 g 3 9/10/2024    hydroCHLOROthiazide 25 MG Oral Tab Take 1 tablet (25 mg total) by mouth daily. 90 tablet 0 2024    albuterol (2.5 MG/3ML) 0.083% Inhalation Nebu Soln Take 3 mL (2.5 mg total) by nebulization every 4 (four) hours as needed for Wheezing or Shortness of Breath. 120 mL 0 Unknown     Current Facility-Administered Medications Ordered in Epic   Medication Dose Route Frequency Provider Last Rate Last Admin    lactated ringers infusion   Intravenous Continuous Jarek Rueda MD        ceFAZolin (Ancef) 3 g in sodium chloride 0.9% 100mL IVPB premix  3 g Intravenous Once Jarek Rueda MD         No current [facility]-ordered outpatient medications on file.       Allergies   Allergen Reactions    Lisinopril Coughing and UNKNOWN       Family History   Problem Relation Age of Onset    Thyroid Disorder Father     Diabetes Father     Diabetes Mother     Hypertension Mother     No Known Problems Maternal Grandmother     No Known Problems Maternal  Grandfather     Glaucoma Neg     Retinal detachment Neg     Macular degeneration Neg      Social History     Socioeconomic History    Marital status:    Occupational History    Occupation: health    Tobacco Use    Smoking status: Former    Smokeless tobacco: Never    Tobacco comments:     On and off   Vaping Use    Vaping status: Never Used   Substance and Sexual Activity    Alcohol use: No    Drug use: No    Sexual activity: Yes     Partners: Female     Comment: with wife    Other Topics Concern    Blood Transfusions No       Available pre-op labs reviewed.  Lab Results   Component Value Date    WBC 8.1 09/18/2024    RBC 4.32 09/18/2024    HGB 11.9 (L) 09/18/2024    HCT 35.7 09/18/2024    MCV 82.6 09/18/2024    MCH 27.5 09/18/2024    MCHC 33.3 09/18/2024    RDW 14.4 09/18/2024    .0 09/18/2024    URINEPREG Negative 09/23/2024     Lab Results   Component Value Date     09/18/2024    K 4.0 09/18/2024     09/18/2024    CO2 26.0 09/18/2024    BUN 14 09/18/2024    CREATSERUM 0.75 09/18/2024    GLU 86 09/18/2024    PGLU 176 (H) 09/23/2024    CA 9.2 09/18/2024          Vital Signs:  Body mass index is 50.3 kg/m².   height is 1.575 m (5' 2\") and weight is 124.7 kg (275 lb). Her oral temperature is 97.8 °F (36.6 °C). Her blood pressure is 144/63 and her pulse is 77. Her respiration is 18 and oxygen saturation is 96%.   Vitals:    09/13/24 1256 09/23/24 0600   BP:  144/63   Pulse:  77   Resp:  18   Temp:  97.8 °F (36.6 °C)   TempSrc:  Oral   SpO2:  96%   Weight: 129.3 kg (285 lb) 124.7 kg (275 lb)   Height: 1.575 m (5' 2\") 1.575 m (5' 2\")        Anesthesia Evaluation      Airway   Mallampati: III  TM distance: >3 FB  Neck ROM: full  Dental      Pulmonary - normal exam   (+) asthma, sleep apnea  Cardiovascular - normal exam  (+) hypertension, past MI    ROS comment: Coronary Angiography  RCA:  Dominant and free of obstructive disease, supplies PDA and PL     Left main:  Free of obstructive  disease     Left anterior descending:  Free of obstructive disease, supplies multiple diagonals which are non-obstructive     Circumflex:  Free of obstructive disease, supplies multiple OM branches which are patent    Echo:  Conclusions:     1. Left ventricle: The cavity size was normal. Wall thickness was mildly to      moderately increased. Systolic function was normal. The estimated      ejection fraction was 65-70%. Unable to assess LV diastolic function due      to heart rhythm.   2. Right ventricle: The cavity size was normal. Systolic function was      normal.   3. Left atrium: The left atrial volume was normal.   Impressions:  No previous study was available for comparison.       Neuro/Psych      GI/Hepatic/Renal      Endo/Other    Abdominal   (+) obese                 Anesthesia Plan:   ASA:  3  Plan:   General  Airway:  ETT and Video laryngoscope  Informed Consent Plan and Risks Discussed With:  Patient      I have informed Yareli Lynch and/or legal guardian or family member of the nature of the anesthetic plan, benefits, risks including possible dental damage if relevant, major complications, and any alternative forms of anesthetic management.   All of the patient's questions were answered to the best of my ability. The patient desires the anesthetic management as planned.  Aurelio Vazquez DO  9/23/2024 7:08 AM  Present on Admission:  **None**

## 2024-09-23 NOTE — ANESTHESIA POSTPROCEDURE EVALUATION
Patient: Yareli Lynch    Procedure Summary       Date: 09/23/24 Room / Location: [facility] MAIN OR 01 / [facility] MAIN OR    Anesthesia Start: 0739 Anesthesia Stop: 0850    Procedure: Laparoscopic gastric sleeve (Abdomen) Diagnosis: (Obesity, BMI, HtN, PCOS, YAMILE, DM, FE def anemia)    Surgeons: Jarek Rueda MD Anesthesiologist: Aurelio Vazquez DO    Anesthesia Type: general ASA Status: 3            Anesthesia Type: general    Vitals Value Taken Time   /86 09/23/24 0849   Temp 97.3 °F (36.3 °C) 09/23/24 0849   Pulse 80 09/23/24 0849   Resp 32 09/23/24 0849   SpO2 97 % 09/23/24 0849   Vitals shown include unfiled device data.    [facility] AN Post Evaluation:   Patient Evaluated in PACU  Patient Participation: complete - patient participated  Level of Consciousness: awake  Pain Score: 2  Pain Management: adequate  Airway Patency:patent  Dental exam unchanged from preop  Yes    Nausea/Vomiting: none  Cardiovascular Status: acceptable  Respiratory Status: acceptable and nasal cannula  Postoperative Hydration acceptable  Comments: Accucheck 158  Report to RENETTA Mittal CRNA  9/23/2024 8:50 AM

## 2024-09-23 NOTE — ANESTHESIA PROCEDURE NOTES
Airway  Date/Time: 9/23/2024 7:51 AM  Urgency: Elective      General Information and Staff    Patient location during procedure: OR  Anesthesiologist: Aurelio Vazquez DO  Resident/CRNA: Marion Lunsford CRNA  Performed: CRNA   Performed by: Marion Lunsford CRNA  Authorized by: Aurelio Vazquez DO      Indications and Patient Condition  Indications for airway management: anesthesia  Sedation level: deep  Preoxygenated: yes  Patient position: sniffing  Mask difficulty assessment: 1 - vent by mask    Final Airway Details  Final airway type: endotracheal airway      Successful airway: ETT  Cuffed: yes   Successful intubation technique: Video laryngoscopy  Endotracheal tube insertion site: oral  Blade: GlideScope  Blade size: #3  ETT size (mm): 7.0    Cormack-Lehane Classification: grade I - full view of glottis  Placement verified by: capnometry   Measured from: lips  Number of attempts at approach: 1    Additional Comments  Dental exam unchanged from pre iop

## 2024-09-23 NOTE — OPERATIVE REPORT
[facility] / [facility]  Marcos Webster / Sandra / Antonio / Kami / Mohit  Office - [phone]  Answering Service [phone]        Date: 9/23/2024  Preop Diagnosis: Morbid Obesity secondary to excess calories   Postop Diagnosis: Morbid Obesity secondary to excess calories  Procedure: Laparoscopic sleeve gastrectomy  Surgeon: Mohit  Assistant: Sandra  Anesthesia: GETA  EBL: 2 ml  Complications: None    Indications: Pt is a 44 year old female who presents for elective bariatric surgery.  she has been counseled regarding the treatment of severe obesity, the options of behavioral, medical, and surgical treatment plans, and the potential pros and cons of each. she understands the multidisciplinary approach to the treatment of obesity, and the need for long-term followup.   Through a lengthy preoperative counseling process, she has elected for the sleeve gastrectomy as her procedure of choice.  she understands the different options for bariatric surgery, the laparoscopic approach, the possibility conversion to open, the anticipated pain and recovery time, the anticipated weight loss, the pros and cons of each operation, and the risks of bariatric surgery associated, including but not limited to universal risks such as bleeding, infection, DVT, PE, cardiac and pulmonary complications, and the small risk of suffering a fatal complication/death, as well as risks specific to the staple line leak, and understands the risk of postoperative reflux.  I discussed the possibility of encountering a hiatal hernia during the procedure in which case we may fix it in addition to the sleeve gastrectomy.  she also understands the potential long term sequelae and complications such as marginal ulcer, stricture, postoperative reflux.  she also understands the need for lifelong nutritional supplementation, and long-term follow-up, and agrees to proceed.    Operative Summary: Patient was brought to the  operating room and placed under general anesthesia.  Preoperative antibiotics and heparin were given and was secured to the operating room table.  Prepped and draped in a sterile fashion.  An Optiview technique was used to insert an 11 mm left paramedian trocar and the abdomen was insufflated and the patient tolerated the insufflation well throughout the entire case.  Cursory examination of the abdomen was unremarkable on insertion of the laparoscope.  The patient was then placed in steep reverse Trendelenburg position.  A 5 mm left lateral trocar, a 5 mm right upper quadrant trocar, and a 15 mm right paramedian trocar were all inserted under direct vision.  A Dolores liver retractor was inserted through an epigastric port.  0.25% Marcaine was injected for local anesthesia during trocar placement as well as a bilateral TAP block.      We began by mobilizing the greater curvature of the stomach using an Enseal device.  We took this dissection all the way up along the greater curve and mobilize the entire fundus.  The angle of His was carefully dissected out identifying the anterior border of the left samuel.  The posterior fundus was carefully mobilized off of the retroperitoneum.  There was no hiatal hernia noted.  Once the fundus was completely mobilized we continued our dissection distally along the greater curvature towards the antrum.  We stopped the dissection at the starting point of our sleeve gastrectomy, which was about 6 cm proximal to the pylorus.    We then advanced a 40 [place] Islander Visi G bougie that was inserted by anesthesia and guided along the lesser curve towards the antrum.  We then performed our sleeve gastrectomy starting with sequential fires of the stapler with Seamguard reinforcements.  Along our sleeve, we ensured that we did not narrow the sleeve at the incisura and that we did not cause any twisting or corkscrewing of the sleeve along its path.  We completed our sleeve with a total of 2 black  loads and 2 purple loads, taking care to ensure that we did not leave a significant posterior fundic pouch.  The final seam guard was cut sharply, completing the sleeve gastrectomy.    We ensured that there was excellent hemostasis along all dissection lines.  The Visi G was pulled back a few centimeters and the antrum was occluded with atraumatic grasper.  A leak test was performed by insufflating the VISI G under irrigated water.  There was good distention of the sleeve.  There is no evidence of any leak.  There is no evidence of any bleeding along the staple lines.  The irrigation was suctioned out.  The Dolores liver retractor was removed under direct vision.  The patient was flattened out.  The sleeve gastrectomy was extracted from the right paramedian trocar site.  The fascia at the extraction site was closed with 0 PDS suture passed with a Lm No.  Trocars were removed under direct vision. The skin of all incisions was closed with 4-0 Vicryl and Dermabond.  Patient tolerated the procedure well and was brought to the postanesthesia recovery unit in stable condition.

## 2024-09-24 ENCOUNTER — DOCUMENTATION ONLY (OUTPATIENT)
Dept: SURGERY | Facility: CLINIC | Age: 44
End: 2024-09-24

## 2024-09-24 LAB
ANION GAP SERPL CALC-SCNC: 5 MMOL/L (ref 0–18)
BASOPHILS # BLD AUTO: 0.01 X10(3) UL (ref 0–0.2)
BASOPHILS NFR BLD AUTO: 0.1 %
BUN BLD-MCNC: 7 MG/DL (ref 9–23)
BUN/CREAT SERPL: 9.2 (ref 10–20)
CALCIUM BLD-MCNC: 9.2 MG/DL (ref 8.7–10.4)
CHLORIDE SERPL-SCNC: 106 MMOL/L (ref 98–112)
CO2 SERPL-SCNC: 26 MMOL/L (ref 21–32)
CREAT BLD-MCNC: 0.76 MG/DL
DEPRECATED RDW RBC AUTO: 41.8 FL (ref 35.1–46.3)
EGFRCR SERPLBLD CKD-EPI 2021: 98 ML/MIN/1.73M2 (ref 60–?)
EOSINOPHIL # BLD AUTO: 0.01 X10(3) UL (ref 0–0.7)
EOSINOPHIL NFR BLD AUTO: 0.1 %
ERYTHROCYTE [DISTWIDTH] IN BLOOD BY AUTOMATED COUNT: 14.3 % (ref 11–15)
EST. AVERAGE GLUCOSE BLD GHB EST-MCNC: 111 MG/DL (ref 68–126)
GLUCOSE BLD-MCNC: 123 MG/DL (ref 70–99)
HBA1C MFR BLD: 5.5 % (ref ?–5.7)
HCT VFR BLD AUTO: 34.9 %
HGB BLD-MCNC: 11.8 G/DL
IMM GRANULOCYTES # BLD AUTO: 0.05 X10(3) UL (ref 0–1)
IMM GRANULOCYTES NFR BLD: 0.5 %
LYMPHOCYTES # BLD AUTO: 1.27 X10(3) UL (ref 1–4)
LYMPHOCYTES NFR BLD AUTO: 12.1 %
MCH RBC QN AUTO: 27.7 PG (ref 26–34)
MCHC RBC AUTO-ENTMCNC: 33.8 G/DL (ref 31–37)
MCV RBC AUTO: 81.9 FL
MONOCYTES # BLD AUTO: 0.62 X10(3) UL (ref 0.1–1)
MONOCYTES NFR BLD AUTO: 5.9 %
NEUTROPHILS # BLD AUTO: 8.52 X10 (3) UL (ref 1.5–7.7)
NEUTROPHILS # BLD AUTO: 8.52 X10(3) UL (ref 1.5–7.7)
NEUTROPHILS NFR BLD AUTO: 81.3 %
OSMOLALITY SERPL CALC.SUM OF ELEC: 283 MOSM/KG (ref 275–295)
PLATELET # BLD AUTO: 224 10(3)UL (ref 150–450)
POTASSIUM SERPL-SCNC: 4.3 MMOL/L (ref 3.5–5.1)
RBC # BLD AUTO: 4.26 X10(6)UL
SODIUM SERPL-SCNC: 137 MMOL/L (ref 136–145)
WBC # BLD AUTO: 10.5 X10(3) UL (ref 4–11)

## 2024-09-24 PROCEDURE — 99233 SBSQ HOSP IP/OBS HIGH 50: CPT | Performed by: HOSPITALIST

## 2024-09-24 RX ORDER — LOSARTAN POTASSIUM 50 MG/1
50 TABLET ORAL DAILY
Status: DISCONTINUED | OUTPATIENT
Start: 2024-09-24 | End: 2024-09-25

## 2024-09-24 RX ORDER — ACETAMINOPHEN 160 MG/5ML
500 SOLUTION ORAL EVERY 8 HOURS
Status: DISCONTINUED | OUTPATIENT
Start: 2024-09-24 | End: 2024-09-25

## 2024-09-24 RX ORDER — HYDROCODONE BITARTRATE AND ACETAMINOPHEN 7.5; 325 MG/1; MG/1
1 TABLET ORAL EVERY 4 HOURS PRN
Status: DISCONTINUED | OUTPATIENT
Start: 2024-09-24 | End: 2024-09-25

## 2024-09-24 RX ORDER — DILTIAZEM HYDROCHLORIDE 30 MG/1
60 TABLET, FILM COATED ORAL EVERY 8 HOURS SCHEDULED
Status: DISCONTINUED | OUTPATIENT
Start: 2024-09-24 | End: 2024-09-25

## 2024-09-24 NOTE — PROGRESS NOTES
[facility] / [facility]  Marcos Webster / Sandra / Antonio / Kami / Mohit  Office - [phone]  Answering Service [phone]      Progress Note    Yareli Lynch Patient Status:  Inpatient    3/23/1980 MRN [id number]   Location [facility] 4W/SW/SE Attending Melisa Tomas MD   Hosp Day # 1 PCP Yo Winchester, DO     Subjective:  Pt feels ok, some pain with drinking, BP up, hospitalist ordered meds    Objective/Physical Exam:  Temp:  [97.3 °F (36.3 °C)-98.3 °F (36.8 °C)] 98.1 °F (36.7 °C)  Pulse:  [71-96] 84  Resp:  [14-24] 18  BP: (137-185)/(70-95) 179/87  SpO2:  [94 %-100 %] 99 %    Intake/Output Summary (Last 24 hours) at 2024 0809  Last data filed at 2024 0425  Gross per 24 hour   Intake 2894 ml   Output 2302 ml   Net 592 ml       General:    nad  Lungs:  unlabored  CV:  RRR, HR 80  Abdomen:  Mild tender near incisions, no hernias, no infection, no distension  Current Meds:  Current Facility-Administered Medications   Medication Dose Route Frequency    lactated ringers infusion   Intravenous Continuous    scopolamine (Transderm-Scop) 1 MG/3DAYS patch 1 patch  1 patch Transdermal Once    lactated ringers infusion   Intravenous Continuous    enoxaparin (Lovenox) 40 MG/0.4ML SUBQ injection 40 mg  40 mg Subcutaneous Daily    acetaminophen (Tylenol) 160 MG/5ML oral liquid 1,000 mg  1,000 mg Oral Q8H    ketorolac (Toradol) 30 MG/ML injection 30 mg  30 mg Intravenous Q6H    oxyCODONE immediate release tab 5 mg  5 mg Oral Q6H PRN    morphINE PF 2 MG/ML injection 1 mg  1 mg Intravenous Q2H PRN    Or    morphINE PF 2 MG/ML injection 2 mg  2 mg Intravenous Q2H PRN    Or    morphINE PF 4 MG/ML injection 4 mg  4 mg Intravenous Q2H PRN    ondansetron (Zofran) 4 MG/2ML injection 4 mg  4 mg Intravenous 4 times per day    pantoprazole (Protonix) DR tab 40 mg  40 mg Oral QAM AC    dilTIAZem ER (Dilacor XR) 24 hr cap 240 mg  240 mg Oral Daily    losartan (Cozaar) tab  25 mg  25 mg Oral Q24H    albuterol (Ventolin HFA) 108 (90 Base) MCG/ACT inhaler 1-2 puff  1-2 puff Inhalation Q4H PRN    hydrALAzine (Apresoline) 20 mg/mL injection 10 mg  10 mg Intravenous Q6H PRN       Labs:  Lab Results   Component Value Date    WBC 10.5 09/24/2024    HGB 11.8 (L) 09/24/2024    HCT 34.9 (L) 09/24/2024    .0 09/24/2024     09/24/2024    K 4.3 09/24/2024    CO2 26.0 09/24/2024    BUN 7 (L) 09/24/2024     (H) 09/24/2024       Imaging:  No results found.    Assessment/Plan:  S/p sleeve, doing ok, needs better po- 10oz thus far. Ambulate, pain control, BP control  Home tomorrow    New Flores MD  9/24/2024  8:09 AM

## 2024-09-24 NOTE — PROGRESS NOTES
[facility]  part of [facility]    Progress Note    Yareli Lynch Patient Status:  Inpatient    3/23/1980 MRN [id number]   Location [facility] 4W/SW/SE Attending Melisa Tomas MD   Hosp Day # 1 PCP Yo Winchester DO     Chief complaint: post op  Subjective:   Pains controled, mostly c/o nausea, sore throat, not drinking much liquids  Attributes nausea to oxy  States she didn't have such issues before with norco    Mom at bedside    Objective:   Blood pressure (!) 187/92, pulse 92, temperature 98.3 °F (36.8 °C), temperature source Oral, resp. rate 18, height 5' 2\" (1.575 m), weight 275 lb (124.7 kg), last menstrual period 2024, SpO2 100%, not currently breastfeeding.    HEENT: conjunctivae/corneas clear. PERRL, EOM's intact.  Neck: no adenopathy, no carotid bruit, no JVD, supple  Pulmonary:  clear to auscultation bilaterally  Cardiovascular: S1, S2 normal, no murmur, click, rub or gallop, regular rate and rhythm  Abdominal: soft, non-tender; bowel sounds normal; no masses,  no organomegaly  Extremities: no cyanosis or edema  Pulses: palpable and symmetric  Skin: No rashes or lesions  Neurologic: Alert and oriented X 3,    Psychiatric: calm, cooperative    Assessment and Plan:      Morbid (severe) obesity due to excess calories (HCC)  -Status post laparoscopic sleeve gastrectomy on .  - has been on ozempic pre op  - cont pain control, close monitoring with IV narcotics  - however c/o nausea, difficulty keeping up with fluids  - may try norco instead of oxy  - Encourage Incentive spirometry  - cont liquid challenge per surgery.  - F/u further bariatric surgery recs     HTN  - BP elevations noted -->incr dose of losartan  - not able to take home dilacor due to size/not able to crush-->change to   - hydrochlorothiazide hold due to risk of dehydration post op  - Monitor and adjust as needed  - labs ok    Asthma-->Cont albuterol prn    DVT prophylaxis: lovenox     Dispo:  pending progress, probably tomorrow       Chart reviewed, including current vitals, notes, labs and imaging  Pertinent past medical records reviewed  Labs ordered and medications adjusted as outlined above  Coordinate care with care team/consultants  Discussed with patient and family at bedside results of tests, management plan as outlined above, and the need for ongoing hospitalization  D/w RN     MARIEL high  severe acute illness/or exacerbation of chronic illness posing a threat to life, IV medications, requiring close monitoring in hospital.       Results:     Lab Results   Component Value Date    WBC 10.5 09/24/2024    HGB 11.8 (L) 09/24/2024    HCT 34.9 (L) 09/24/2024    .0 09/24/2024    CREATSERUM 0.76 09/24/2024    BUN 7 (L) 09/24/2024     09/24/2024    K 4.3 09/24/2024     09/24/2024    CO2 26.0 09/24/2024     (H) 09/24/2024    CA 9.2 09/24/2024    ALB 3.3 (L) 09/07/2023    ALKPHO 66 09/07/2023    BILT 0.2 09/07/2023    TP 7.3 09/07/2023    AST 13 (L) 09/07/2023    ALT 19 09/07/2023    PTT 38.2 (H) 01/16/2024    TSH 1.360 09/07/2023    DDIMER <0.27 01/14/2024    B12 377 09/07/2023       No results found.          TANNER DEL RIO MD  9/24/2024

## 2024-09-24 NOTE — PROGRESS NOTES
Provided/reviewed bariatric post-op discharge instructions; stressed importance of fluids aim for 64 ozs/day, pt had 10 ozs fluids so far; caring for incisions, activities/allowed/avoid; meds to take/avoid; importance of bariatric vits; pt  purchased bariatric vits; unsure of brand; pt will check with RD when she gets home for appropriateness; managing constipation; post-op fu; pt has appt next week w/ surgeon; signs and symptoms of concern; contact info; pt agreed and verbalized understanding.

## 2024-09-24 NOTE — PLAN OF CARE
Problem: Patient Centered Care  Goal: Patient preferences are identified and integrated in the patient's plan of care  Description: Interventions:  - What would you like us to know as we care for you?   - Provide timely, complete, and accurate information to patient/family  - Incorporate patient and family knowledge, values, beliefs, and cultural backgrounds into the planning and delivery of care  - Encourage patient/family to participate in care and decision-making at the level they choose  - Honor patient and family perspectives and choices  Outcome: Progressing     Problem: Patient/Family Goals  Goal: Patient/Family Long Term Goal  Description: Patient's Long Term Goal:     Interventions:  - Follow up with MD  - Pain management   - See additional Care Plan goals for specific interventions  Outcome: Progressing  Goal: Patient/Family Short Term Goal  Description: Patient's Short Term Goal:     Interventions:   - Pain management  - Monitor lab  - See additional Care Plan goals for specific interventions  Outcome: Progressing     Problem: PAIN - ADULT  Goal: Verbalizes/displays adequate comfort level or patient's stated pain goal  Description: INTERVENTIONS:  - Encourage pt to monitor pain and request assistance  - Assess pain using appropriate pain scale  - Administer analgesics based on type and severity of pain and evaluate response  - Implement non-pharmacological measures as appropriate and evaluate response  - Consider cultural and social influences on pain and pain management  - Manage/alleviate anxiety  - Utilize distraction and/or relaxation techniques  - Monitor for opioid side effects  - Notify MD/LIP if interventions unsuccessful or patient reports new pain  - Anticipate increased pain with activity and pre-medicate as appropriate  Outcome: Progressing     Problem: SAFETY ADULT - FALL  Goal: Free from fall injury  Description: INTERVENTIONS:  - Assess pt frequently for physical needs  - Identify  cognitive and physical deficits and behaviors that affect risk of falls.  - [place] fall precautions as indicated by assessment.  - Educate pt/family on patient safety including physical limitations  - Instruct pt to call for assistance with activity based on assessment  - Modify environment to reduce risk of injury  - Provide assistive devices as appropriate  - Consider OT/PT consult to assist with strengthening/mobility  - Encourage toileting schedule  Outcome: Progressing     Problem: DISCHARGE PLANNING  Goal: Discharge to home or other facility with appropriate resources  Description: INTERVENTIONS:  - Identify barriers to discharge w/pt and caregiver  - Include patient/family/discharge partner in discharge planning  - Arrange for needed discharge resources and transportation as appropriate  - Identify discharge learning needs (meds, wound care, etc)  - Arrange for interpreters to assist at discharge as needed  - Consider post-discharge preferences of patient/family/discharge partner  - Complete POLST form as appropriate  - Assess patient's ability to be responsible for managing their own health  - Refer to Case Management Department for coordinating discharge planning if the patient needs post-hospital services based on physician/LIP order or complex needs related to functional status, cognitive ability or social support system  Outcome: Progressing     Yareli was resting in bed, c/o increased pain upon movement and ambulation. She also c/o nausea. PRN Oxy given alongside scheduled meds with some relief noted. She's on IVF. She's ambulating well in room with standby assist, and voiding freely in the bathroom. Tele in place. Plan for discharge back to home when medically stable.

## 2024-09-24 NOTE — PROGRESS NOTES
Bariatric Inpatient Nutrition Note      Yareli Lynch is a 44 year old female.    Procedure: Sleeve gastrectomy  Surgery Date: 9/23/24  Medical Diagnosis: Morbid obesity    Height:    Ht Readings from Last 1 Encounters:   09/23/24 5' 2\" (1.575 m)       Weight:    Wt Readings from Last 6 Encounters:   09/23/24 275 lb (124.7 kg)   08/27/24 281 lb (127.5 kg)   07/24/24 281 lb (127.5 kg)   05/29/24 284 lb (128.8 kg)   05/09/24 288 lb (130.6 kg)   05/01/24 288 lb (130.6 kg)       Weight change:  - 6 lbs since LOV   BMI: Body mass index is 50.3 kg/m².    Nutrition Diagnosis: Morbid obesity: Unresolved  Patient complains of: abdominal pain, right side, pain lvl 8    Pt's mother in room; pt asked why she is not allowed to drink with straws; provided explanation to their satisfaction.    Pt awake and alert, sitting up in bed.    Diet history    Current Diet Order: Bariatric Clear Liquid  Protein Intake:  none so far  Fluid Intake:  10 ozs so far; pt reports pain in top center of abdomen after sipping    Food Intolerances: Dairy  Vitamins/Minerals: Other: pt unsure, says it is 1 a day vits; pt will check with RD when she gets home to confirm appropriateness      Labs    Recent Labs   Lab 09/18/24  1427 09/24/24  0534   RBC 4.32 4.26   HGB 11.9* 11.8*   HCT 35.7 34.9*   MCV 82.6 81.9   MCH 27.5 27.7   MCHC 33.3 33.8   RDW 14.4 14.3   NEPRELIM 5.19 8.52*   WBC 8.1 10.5   .0 224.0       Recent Labs   Lab 09/18/24  1427 09/24/24  0534   GLU 86 123*   BUN 14 7*   CREATSERUM 0.75 0.76   CA 9.2 9.2    137   K 4.0 4.3    106   CO2 26.0 26.0       No results found for: \"MG\"  No results found for: \"PHOS\"    HGBA1C:    Lab Results   Component Value Date    A1C 6.1 (H) 01/15/2024    A1C 6.7 (H) 09/07/2023    A1C 5.7 (H) 02/28/2022     (H) 01/15/2024       Vitamins/Minerals:  Lab Results   Component Value Date    B12 377 09/07/2023     Lab Results   Component Value Date    VITD 22.2 (L) 09/07/2023     No  results found for: \"THIAMINE\"   Lab Results   Component Value Date/Time    VITB1 96.2 09/07/2023 03:17 PM     Lab Results   Component Value Date/Time    FOLIC 14.2 09/07/2023 03:17 PM       Medications    IV Fluids: 150 ml/hr   lactated ringers infusion   Intravenous Continuous    [COMPLETED] acetaminophen (Tylenol Extra Strength) tab 1,000 mg  1,000 mg Oral Once    [COMPLETED] famotidine (Pepcid) tab 20 mg  20 mg Oral Once    Or    [COMPLETED] famotidine (Pepcid) 20 mg/2mL injection 20 mg  20 mg Intravenous Once    [COMPLETED] metoclopramide (Reglan) tab 10 mg  10 mg Oral Once    Or    [COMPLETED] metoclopramide (Reglan) 5 mg/mL injection 10 mg  10 mg Intravenous Once    [COMPLETED] heparin (Porcine) 5000 UNIT/ML injection 5,000 Units  5,000 Units Subcutaneous Once    [COMPLETED] gabapentin (Neurontin) cap 300 mg  300 mg Oral Once    [COMPLETED] ceFAZolin (Ancef) 3 g in sodium chloride 0.9% 100mL IVPB premix  3 g Intravenous Once    scopolamine (Transderm-Scop) 1 MG/3DAYS patch 1 patch  1 patch Transdermal Once    lactated ringers infusion   Intravenous Continuous    enoxaparin (Lovenox) 40 MG/0.4ML SUBQ injection 40 mg  40 mg Subcutaneous Daily    acetaminophen (Tylenol) 160 MG/5ML oral liquid 1,000 mg  1,000 mg Oral Q8H    ketorolac (Toradol) 30 MG/ML injection 30 mg  30 mg Intravenous Q6H    oxyCODONE immediate release tab 5 mg  5 mg Oral Q6H PRN    morphINE PF 2 MG/ML injection 1 mg  1 mg Intravenous Q2H PRN    Or    morphINE PF 2 MG/ML injection 2 mg  2 mg Intravenous Q2H PRN    Or    morphINE PF 4 MG/ML injection 4 mg  4 mg Intravenous Q2H PRN    ondansetron (Zofran) 4 MG/2ML injection 4 mg  4 mg Intravenous 4 times per day    pantoprazole (Protonix) DR tab 40 mg  40 mg Oral QAM AC    dilTIAZem ER (Dilacor XR) 24 hr cap 240 mg  240 mg Oral Daily    losartan (Cozaar) tab 25 mg  25 mg Oral Q24H    albuterol (Ventolin HFA) 108 (90 Base) MCG/ACT inhaler 1-2 puff  1-2 puff Inhalation Q4H PRN    hydrALAzine  (Apresoline) 20 mg/mL injection 10 mg  10 mg Intravenous Q6H PRN          Recommendations/Goals: Reviewed bariatric diet stages, fluid intake goals and chart, Begin bariatric diet stage 1 upon discharge from hospital, Follow discharge instructions , Progress through bariatric diet stages as directed and as tolerated, Start bariatric vitamins this weekend as tolerated, Call the registered dietitian for questions, and Return to clinic  3-4 weeks post-op    Monitor/Evaluate: Anthropometric measurements, Food/fluid intake/choices, Food intolerances, Activity level, Vitamin/mineral supplementation, Reinforce goals, Calorie/protein intake, and Other:  RTC for 1st surgeon post-op visit 7-10 days.    Neha Cano MS, RD, LDN

## 2024-09-24 NOTE — PLAN OF CARE
Patient alert and oriented x4. HTN, prn hydralazine given. MD notifeid. PO antihypertensives as ordered. BP improved. PO intake encouraged, pt somewhat improving throughout the day, though not necessarily meeting 4oz/ hr requirements. Voiding freely. Incisions c/d/I. +belch. +flatus. Ambulation encouraged. Ambulating in hallway with family. Fall precautions in place.   Problem: Patient Centered Care  Goal: Patient preferences are identified and integrated in the patient's plan of care  Description: Interventions:  - What would you like us to know as we care for you?   - Provide timely, complete, and accurate information to patient/family  - Incorporate patient and family knowledge, values, beliefs, and cultural backgrounds into the planning and delivery of care  - Encourage patient/family to participate in care and decision-making at the level they choose  - Honor patient and family perspectives and choices  Outcome: Progressing     Problem: Patient/Family Goals  Goal: Patient/Family Long Term Goal  Description: Patient's Long Term Goal:     Interventions:  -   - See additional Care Plan goals for specific interventions  Outcome: Progressing  Goal: Patient/Family Short Term Goal  Description: Patient's Short Term Goal:     Interventions:   -   - See additional Care Plan goals for specific interventions  Outcome: Progressing     Problem: PAIN - ADULT  Goal: Verbalizes/displays adequate comfort level or patient's stated pain goal  Description: INTERVENTIONS:  - Encourage pt to monitor pain and request assistance  - Assess pain using appropriate pain scale  - Administer analgesics based on type and severity of pain and evaluate response  - Implement non-pharmacological measures as appropriate and evaluate response  - Consider cultural and social influences on pain and pain management  - Manage/alleviate anxiety  - Utilize distraction and/or relaxation techniques  - Monitor for opioid side effects  - Notify MD/LIP if  interventions unsuccessful or patient reports new pain  - Anticipate increased pain with activity and pre-medicate as appropriate  Outcome: Progressing     Problem: SAFETY ADULT - FALL  Goal: Free from fall injury  Description: INTERVENTIONS:  - Assess pt frequently for physical needs  - Identify cognitive and physical deficits and behaviors that affect risk of falls.  - [place] fall precautions as indicated by assessment.  - Educate pt/family on patient safety including physical limitations  - Instruct pt to call for assistance with activity based on assessment  - Modify environment to reduce risk of injury  - Provide assistive devices as appropriate  - Consider OT/PT consult to assist with strengthening/mobility  - Encourage toileting schedule  Outcome: Progressing     Problem: DISCHARGE PLANNING  Goal: Discharge to home or other facility with appropriate resources  Description: INTERVENTIONS:  - Identify barriers to discharge w/pt and caregiver  - Include patient/family/discharge partner in discharge planning  - Arrange for needed discharge resources and transportation as appropriate  - Identify discharge learning needs (meds, wound care, etc)  - Arrange for interpreters to assist at discharge as needed  - Consider post-discharge preferences of patient/family/discharge partner  - Complete POLST form as appropriate  - Assess patient's ability to be responsible for managing their own health  - Refer to Case Management Department for coordinating discharge planning if the patient needs post-hospital services based on physician/LIP order or complex needs related to functional status, cognitive ability or social support system  Outcome: Progressing

## 2024-09-25 VITALS
WEIGHT: 275 LBS | TEMPERATURE: 98 F | DIASTOLIC BLOOD PRESSURE: 68 MMHG | BODY MASS INDEX: 50.61 KG/M2 | HEIGHT: 62 IN | OXYGEN SATURATION: 96 % | RESPIRATION RATE: 18 BRPM | SYSTOLIC BLOOD PRESSURE: 133 MMHG | HEART RATE: 71 BPM

## 2024-09-25 PROCEDURE — 99239 HOSP IP/OBS DSCHRG MGMT >30: CPT | Performed by: HOSPITALIST

## 2024-09-25 RX ORDER — LOSARTAN POTASSIUM 50 MG/1
50 TABLET ORAL DAILY
Qty: 30 TABLET | Refills: 0 | Status: SHIPPED | OUTPATIENT
Start: 2024-09-26

## 2024-09-25 RX ORDER — DILTIAZEM HCL 60 MG
60 TABLET ORAL 3 TIMES DAILY
Qty: 90 TABLET | Refills: 0 | Status: SHIPPED | OUTPATIENT
Start: 2024-09-25

## 2024-09-25 RX ORDER — PANTOPRAZOLE SODIUM 40 MG/1
40 TABLET, DELAYED RELEASE ORAL
Qty: 30 TABLET | Refills: 2 | Status: SHIPPED | OUTPATIENT
Start: 2024-09-26

## 2024-09-25 RX ORDER — HYDROCODONE BITARTRATE AND ACETAMINOPHEN 7.5; 325 MG/1; MG/1
1 TABLET ORAL EVERY 4 HOURS PRN
Qty: 15 TABLET | Refills: 0 | Status: SHIPPED | OUTPATIENT
Start: 2024-09-25 | End: 2024-10-02

## 2024-09-25 NOTE — PROGRESS NOTES
[facility] / [facility]  Marcos Webster / Sandra / Antonio / Kami / Mohit  Office - [phone]  Answering Service [phone]      Progress Note    Yareli Lynch Patient Status:  Inpatient    3/23/1980 MRN [id number]   Location [facility] 4W/SW/SE Attending Melisa Tomas MD   Hosp Day # 2 PCP Yo Winchester, DO     Subjective:  Doing great, pain controlled, sima liquids, ambulating    Objective/Physical Exam:  Temp:  [97.6 °F (36.4 °C)-98.1 °F (36.7 °C)] 98.1 °F (36.7 °C)  Pulse:  [71-97] 71  Resp:  [18-20] 18  BP: (133-152)/(68-81) 133/68  SpO2:  [96 %-100 %] 96 %    Intake/Output Summary (Last 24 hours) at 2024 1115  Last data filed at 2024 0700  Gross per 24 hour   Intake 3975 ml   Output 1375 ml   Net 2600 ml       General:    nad  Lungs:  unlabored  CV:  RRR, HR 80  Abdomen:  Mild tender near incisions, no hernias, no infection, no distension  Current Meds:  Current Facility-Administered Medications   Medication Dose Route Frequency    losartan (Cozaar) tab 50 mg  50 mg Oral Daily    dilTIAZem (cardIZEM) tab 60 mg  60 mg Oral Q8H ZAIN    HYDROcodone-acetaminophen (Norco) 7.5-325 MG per tab 1 tablet  1 tablet Oral Q4H PRN    acetaminophen (Tylenol) 160 MG/5ML oral liquid 500 mg  500 mg Oral Q8H    lactated ringers infusion   Intravenous Continuous    scopolamine (Transderm-Scop) 1 MG/3DAYS patch 1 patch  1 patch Transdermal Once    lactated ringers infusion   Intravenous Continuous    enoxaparin (Lovenox) 40 MG/0.4ML SUBQ injection 40 mg  40 mg Subcutaneous Daily    oxyCODONE immediate release tab 5 mg  5 mg Oral Q6H PRN    morphINE PF 2 MG/ML injection 1 mg  1 mg Intravenous Q2H PRN    Or    morphINE PF 2 MG/ML injection 2 mg  2 mg Intravenous Q2H PRN    Or    morphINE PF 4 MG/ML injection 4 mg  4 mg Intravenous Q2H PRN    ondansetron (Zofran) 4 MG/2ML injection 4 mg  4 mg Intravenous 4 times per day    pantoprazole (Protonix) DR tab 40 mg  40 mg  Oral QAM AC    albuterol (Ventolin HFA) 108 (90 Base) MCG/ACT inhaler 1-2 puff  1-2 puff Inhalation Q4H PRN    hydrALAzine (Apresoline) 20 mg/mL injection 10 mg  10 mg Intravenous Q6H PRN       Labs:  Lab Results   Component Value Date    WBC 10.5 09/24/2024    HGB 11.8 (L) 09/24/2024    HCT 34.9 (L) 09/24/2024    .0 09/24/2024     09/24/2024    K 4.3 09/24/2024    CO2 26.0 09/24/2024    BUN 7 (L) 09/24/2024     (H) 09/24/2024       Imaging:  No results found.    Assessment/Plan:  S/p sleeve, doing well post-op, showing a lot better effort today  - cont liquids  - importance of hydration discussed with pt who verbalizes understanding  - DC home today  - FU in clinic next week    Jarek Rueda MD, 09/25/24, 11:15 AM

## 2024-09-25 NOTE — PLAN OF CARE
Problem: Patient Centered Care  Goal: Patient preferences are identified and integrated in the patient's plan of care  Description: Interventions:  - What would you like us to know as we care for you?   - Provide timely, complete, and accurate information to patient/family  - Incorporate patient and family knowledge, values, beliefs, and cultural backgrounds into the planning and delivery of care  - Encourage patient/family to participate in care and decision-making at the level they choose  - Honor patient and family perspectives and choices  Outcome: Progressing     Problem: Patient/Family Goals  Goal: Patient/Family Long Term Goal  Description: Patient's Long Term Goal:     Interventions:  - Follow up with MD  - See additional Care Plan goals for specific interventions  Outcome: Progressing  Goal: Patient/Family Short Term Goal  Description: Patient's Short Term Goal:     Interventions:   - Pain management  - Monitor lab  - See additional Care Plan goals for specific interventions  Outcome: Progressing     Problem: PAIN - ADULT  Goal: Verbalizes/displays adequate comfort level or patient's stated pain goal  Description: INTERVENTIONS:  - Encourage pt to monitor pain and request assistance  - Assess pain using appropriate pain scale  - Administer analgesics based on type and severity of pain and evaluate response  - Implement non-pharmacological measures as appropriate and evaluate response  - Consider cultural and social influences on pain and pain management  - Manage/alleviate anxiety  - Utilize distraction and/or relaxation techniques  - Monitor for opioid side effects  - Notify MD/LIP if interventions unsuccessful or patient reports new pain  - Anticipate increased pain with activity and pre-medicate as appropriate  Outcome: Progressing     Problem: SAFETY ADULT - FALL  Goal: Free from fall injury  Description: INTERVENTIONS:  - Assess pt frequently for physical needs  - Identify cognitive and physical  deficits and behaviors that affect risk of falls.  - [place] fall precautions as indicated by assessment.  - Educate pt/family on patient safety including physical limitations  - Instruct pt to call for assistance with activity based on assessment  - Modify environment to reduce risk of injury  - Provide assistive devices as appropriate  - Consider OT/PT consult to assist with strengthening/mobility  - Encourage toileting schedule  Outcome: Progressing     Problem: DISCHARGE PLANNING  Goal: Discharge to home or other facility with appropriate resources  Description: INTERVENTIONS:  - Identify barriers to discharge w/pt and caregiver  - Include patient/family/discharge partner in discharge planning  - Arrange for needed discharge resources and transportation as appropriate  - Identify discharge learning needs (meds, wound care, etc)  - Arrange for interpreters to assist at discharge as needed  - Consider post-discharge preferences of patient/family/discharge partner  - Complete POLST form as appropriate  - Assess patient's ability to be responsible for managing their own health  - Refer to Case Management Department for coordinating discharge planning if the patient needs post-hospital services based on physician/LIP order or complex needs related to functional status, cognitive ability or social support system  Outcome: Progressing     Yareli was resting comfortably on the recliner, stated feeling better, up and ambulating well in the room. She's voiding freely in the bathroom. Her pain was managed with PRN [place] and scheduled Toradol at this time. IVF still infusing, she's tolerating clear liquid diet. Plan for discharge back to home when medically stable.

## 2024-09-25 NOTE — PLAN OF CARE
Problem: PAIN - ADULT  Goal: Verbalizes/displays adequate comfort level or patient's stated pain goal  Description: INTERVENTIONS:  - Encourage pt to monitor pain and request assistance  - Assess pain using appropriate pain scale  - Administer analgesics based on type and severity of pain and evaluate response  - Implement non-pharmacological measures as appropriate and evaluate response  - Consider cultural and social influences on pain and pain management  - Manage/alleviate anxiety  - Utilize distraction and/or relaxation techniques  - Monitor for opioid side effects  - Notify MD/LIP if interventions unsuccessful or patient reports new pain  - Anticipate increased pain with activity and pre-medicate as appropriate  Outcome: Adequate for Discharge     Problem: SAFETY ADULT - FALL  Goal: Free from fall injury  Description: INTERVENTIONS:  - Assess pt frequently for physical needs  - Identify cognitive and physical deficits and behaviors that affect risk of falls.  - [place] fall precautions as indicated by assessment.  - Educate pt/family on patient safety including physical limitations  - Instruct pt to call for assistance with activity based on assessment  - Modify environment to reduce risk of injury  - Provide assistive devices as appropriate  - Consider OT/PT consult to assist with strengthening/mobility  - Encourage toileting schedule  Outcome: Adequate for Discharge     Problem: DISCHARGE PLANNING  Goal: Discharge to home or other facility with appropriate resources  Description: INTERVENTIONS:  - Identify barriers to discharge w/pt and caregiver  - Include patient/family/discharge partner in discharge planning  - Arrange for needed discharge resources and transportation as appropriate  - Identify discharge learning needs (meds, wound care, etc)  - Arrange for interpreters to assist at discharge as needed  - Consider post-discharge preferences of patient/family/discharge partner  - Complete POLST form as  appropriate  - Assess patient's ability to be responsible for managing their own health  - Refer to Case Management Department for coordinating discharge planning if the patient needs post-hospital services based on physician/LIP order or complex needs related to functional status, cognitive ability or social support system  Outcome: Adequate for Discharge   Pt alert and oriented. Up with min assist. Encourage her to ambulate. Getting Nor co as needed for pain and tylenol elixir and Toradol scheduled. Voiding freely and plan is to go home with family. Mother at bed side. Call light with in reach and instructed to call for assistance.

## 2024-09-26 ENCOUNTER — TELEPHONE (OUTPATIENT)
Dept: SURGERY | Facility: CLINIC | Age: 44
End: 2024-09-26

## 2024-09-26 ENCOUNTER — PATIENT OUTREACH (OUTPATIENT)
Dept: CASE MANAGEMENT | Age: 44
End: 2024-09-26

## 2024-09-26 NOTE — DISCHARGE SUMMARY
[facility]  DISCHARGE SUMMARY     Yareli Lynch Patient Status:  Inpatient    3/23/1980 MRN [id number]   Location [facility] 4W/SW/SE Attending No att. providers found   Hosp Day # 2 PCP Yo Winchester DO     Date of Admission: 2024  Date of Discharge: 2024  Discharge Disposition: Home or Self Care    Admitting Chief Complaint:   Obesity, BMI, HtN, PCOS, YAMILE, DM, FE def anemia  Morbid (severe) obesity due to excess calories (HCC)    PCP: Yo Winchester DO    Discharge Diagnosis:   Morbid (severe) obesity due to excess calories (HCC)  -Status post laparoscopic sleeve gastrectomy       HTN  Asthma, mild intermittent, stable        History of Present Illness:   Per Dr. St  This is a 44 year oldfemale with history of obesity who underwent a laparoscopic gastric sleeve on . Patient was admitted post operatively for closer monitoring and care. At time of interview, patient reported having some pain, mostly in left upper quadrant.  Associated with nausea, but denied emesis.  Patient otherwise denied CP, SOB, F/C           Brief Synopsis:   Morbid (severe) obesity due to excess calories (HCC)  -Status post laparoscopic sleeve gastrectomy on .  - has been on ozempic pre op  - cont pain control, close monitoring with IV narcotics, successfully transition to oral medication  - Initial nausea, difficulty keeping up with fluids, resolved after changing oxy to Norco  - Encourage Incentive spirometry  - cont liquid challenge per surgery.  - F/u further bariatric surgery recs     HTN  - BP elevations noted -->incr dose of losartan  - not able to take home dilacor due to size/not able to crush post op x 1 month-->changed to short acting cardizem  - hydrochlorothiazide hold due to risk of dehydration post op  - overall BP well control on discharge  - we discussed anticipated steady decrease in BP post op with wt loss and ongoing need to taper her medications, as well as need to daily  monitoring BP at home  -f/u PCP with BP log  - labs ok     Asthma-->Cont albuterol prn     DVT prophylaxis: lovenox     Dispo: home           Discharge Medication List:     Discharge Medications        START taking these medications        Instructions Prescription details   dilTIAZem 60 MG Tabs  Commonly known as: cardIZEM      Take 1 tablet (60 mg total) by mouth 3 (three) times daily.   Quantity: 90 tablet  Refills: 0     HYDROcodone-acetaminophen 7.5-325 MG Tabs  Commonly known as: Norco      Take 1 tablet by mouth every 4 (four) hours as needed.   Quantity: 15 tablet  Refills: 0     pantoprazole 40 MG Tbec  Commonly known as: Protonix      Take 1 tablet (40 mg total) by mouth every morning before breakfast.   Quantity: 30 tablet  Refills: 2            CHANGE how you take these medications        Instructions Prescription details   losartan 50 MG Tabs  Commonly known as: Cozaar  What changed:   medication strength  how much to take  when to take this      Take 1 tablet (50 mg total) by mouth daily.   Quantity: 30 tablet  Refills: 0            CONTINUE taking these medications        Instructions Prescription details   albuterol (2.5 MG/3ML) 0.083% Nebu  Commonly known as: Ventolin      Take 3 mL (2.5 mg total) by nebulization every 4 (four) hours as needed for Wheezing or Shortness of Breath.   Quantity: 120 mL  Refills: 0     albuterol 108 (90 Base) MCG/ACT Aers  Commonly known as: Ventolin HFA      Inhale 1-2 puffs into the lungs every 4 to 6 hours as needed for Wheezing.   Quantity: 18 g  Refills: 3            STOP taking these medications      aprepitant 40 MG Caps  Commonly known as: Emend        dilTIAZem  MG Cp24  Commonly known as: CardIZEM CD        hydroCHLOROthiazide 25 MG Tabs        Ozempic (0.25 or 0.5 MG/DOSE) 2 MG/3ML Sopn  Generic drug: semaglutide                  Where to Get Your Medications        These medications were sent to [facility] [address] [place], [place] - [address]  [phone], [phone]  [address] [place] [id number]      Phone: [phone]   dilTIAZem 60 MG Tabs  HYDROcodone-acetaminophen 7.5-325 MG Tabs  losartan 50 MG Tabs  pantoprazole 40 MG Tbec         Follow-up appointment:   No follow-up provider specified.    Vital signs:  Resp:  [18] 18  BP: (133)/(68) 133/68  SpO2:  [96 %] 96 %    Physical Exam:    General: No acute distress. Eager to go home. Seen with mother at bedside  Respiratory: Clear to auscultation bilaterally. No wheezes. No rhonchi.  Cardiovascular: S1, S2. Regular rate and rhythm. No murmurs, rubs or gallops.   Abdomen: Soft, nontender, nondistended.  Positive bowel sounds. No rebound or guarding.  Neurologic: No focal neurological deficits.   Musculoskeletal: Moves all extremities.  Extremities: No edema.  -----------------------------------------------------------------------------------------------  PATIENT DISCHARGE INSTRUCTIONS: See electronic chart    I d/w pt and family the available results, management plan, medications changes, and discharge instructions including follow ups as outlined above.   Scripts sent to pharmacy.      Hospital Discharge Diagnoses:  post op    Lace+ Score: 56  59-90 High Risk  29-58 Medium Risk  0-28   Low Risk.    TCM Follow-Up Recommendation:  LACE 29-58: Moderate Risk of readmission after discharge from the hospital.        TANNER DEL RIO MD 9/26/2024    Time spent:  > 30 minutes

## 2024-09-26 NOTE — PROGRESS NOTES
Attempted to reach the patient to complete a Transitional Care Management-Hospital follow up call. Left a message for the patient to call the nurse care manager back at, [phone].

## 2024-10-01 DIAGNOSIS — E66.01 MORBID OBESITY WITH BMI OF 50.0-59.9, ADULT (HCC): Primary | ICD-10-CM

## 2024-10-02 ENCOUNTER — DOCUMENTATION ONLY (OUTPATIENT)
Dept: SURGERY | Facility: CLINIC | Age: 44
End: 2024-10-02

## 2024-10-02 ENCOUNTER — OFFICE VISIT (OUTPATIENT)
Dept: SURGERY | Facility: CLINIC | Age: 44
End: 2024-10-02
Payer: COMMERCIAL

## 2024-10-02 VITALS
OXYGEN SATURATION: 99 % | HEIGHT: 63 IN | DIASTOLIC BLOOD PRESSURE: 80 MMHG | SYSTOLIC BLOOD PRESSURE: 128 MMHG | HEART RATE: 85 BPM | BODY MASS INDEX: 46.95 KG/M2 | TEMPERATURE: 97 F | WEIGHT: 265 LBS

## 2024-10-02 DIAGNOSIS — Z98.84 S/P LAPAROSCOPIC SLEEVE GASTRECTOMY: Primary | ICD-10-CM

## 2024-10-02 RX ORDER — TRAMADOL HYDROCHLORIDE 50 MG/1
50 TABLET ORAL EVERY 6 HOURS PRN
Qty: 12 TABLET | Refills: 0 | Status: SHIPPED | OUTPATIENT
Start: 2024-10-02

## 2024-10-02 NOTE — PROGRESS NOTES
[facility], [facility], [place]  [address]  [facility] [address]  Dept: [phone]    10/2/2024   Bariatric Surgery Patient Evaluation    Chief Complaint:  morbid obesity, preop 281, sleeve  24    History of Present Illness:  Yareli Lynch is a 44 year old female presenting for surgical follow-up.  Today she weighs 265 pounds which is 16 fewer than last visit.  She has been having some pain at night from the right sided incision when she is trying to go to sleep, it is better throughout the day.  She has been taking Tylenol for this but does not feel like it is enough.  Oxycodone was too hard on her stomach however so she does not take that.    Past Medical History:    Past Medical History:    Allergic rhinitis    Anxiety    Asthma ([facility])    DVT (deep vein thrombosis) in pregnancy ([facility])    Encounter for therapeutic drug monitoring    Essential hypertension    Gestational diabetes ([facility])    High blood pressure    High myopia    History of IBS    History of PCOS    Hx of motion sickness    Hypertension    IBS (irritable bowel syndrome)    Infertility, female    Morbid obesity with BMI of 45.0-49.9, adult (HCC)    Obesity    Prediabetes    PTSD (post-traumatic stress disorder)    S/P laparoscopic sleeve gastrectomy    Sleep apnea    Visual impairment    wers glasses       Past Surgical History:    Past Surgical History:   Procedure Laterality Date      2019, 2021    Colonoscopy  2018    Colonoscopy  2024    ; colon polyps, hemorrhoids    Colonoscopy N/A 2024    Procedure: COLONOSCOPY;  Surgeon: Olive Ny MD;  Location: [facility] ENDOSCOPY    Egd  2024    ; gastric polyps    Ivf package  2018    Other surgical history      egg retrieval for IVF       Family History:    Family History   Problem Relation Age of Onset    Thyroid Disorder Father     Diabetes Father     Diabetes  Mother     Hypertension Mother     No Known Problems Maternal Grandmother     No Known Problems Maternal Grandfather     Glaucoma Neg     Retinal detachment Neg     Macular degeneration Neg        Social History:    Social History     Socioeconomic History    Marital status:    Occupational History    Occupation: health    Tobacco Use    Smoking status: Former    Smokeless tobacco: Never    Tobacco comments:     On and off   Vaping Use    Vaping status: Never Used   Substance and Sexual Activity    Alcohol use: No    Drug use: No    Sexual activity: Yes     Partners: Female     Comment: with wife    Other Topics Concern    Blood Transfusions No       Medications:   Current Outpatient Medications:     pantoprazole 40 MG Oral Tab EC, Take 1 tablet (40 mg total) by mouth every morning before breakfast., Disp: 30 tablet, Rfl: 2    HYDROcodone-acetaminophen 7.5-325 MG Oral Tab, Take 1 tablet by mouth every 4 (four) hours as needed., Disp: 15 tablet, Rfl: 0    losartan 50 MG Oral Tab, Take 1 tablet (50 mg total) by mouth daily., Disp: 30 tablet, Rfl: 0    dilTIAZem 60 MG Oral Tab, Take 1 tablet (60 mg total) by mouth 3 (three) times daily., Disp: 90 tablet, Rfl: 0    albuterol 108 (90 Base) MCG/ACT Inhalation Aero Soln, Inhale 1-2 puffs into the lungs every 4 to 6 hours as needed for Wheezing., Disp: 18 g, Rfl: 3    albuterol (2.5 MG/3ML) 0.083% Inhalation Nebu Soln, Take 3 mL (2.5 mg total) by nebulization every 4 (four) hours as needed for Wheezing or Shortness of Breath., Disp: 120 mL, Rfl: 0     Allergies:    Allergies   Allergen Reactions    Lisinopril Coughing and UNKNOWN       ROS:      Constitutional: negative  HEENT: negative  Respiratory: negative  Cardiovascular: negative  Gastrointestinal: negative  Genitourinary: negative  Musculoskeletal: negative  Neurological: negative  Psychological: negative  Endocrine: negative  Immunologic: negative  Integumentary: negative      Physical Exam:  Vital  Signs:  LMP 08/26/2024   BMI:  There is no height or weight on file to calculate BMI.  General: no acute distress, well-nourished  HENT: normocephalic, atraumatic  Lung: breathing comfortably, symmetrical expansion, no wheezing  Heart: regular rate and rhythm  Abdomen: soft, mildly tender at extraction incision, non-distended, no hernia/mass/organomegaly, incisions healing well, no sign of infection  Extremities: normal strength, normal ROM, walks without assist device  Neuro: no focal deficits, cranial nerves grossly intact  Psych: alert and oriented, normal affect  Skin: warm, dry      Assessment: 44 year old female with chronic morbid obesity who would benefit from significant and sustained weight loss, now doing well after sleeve gastrectomy.  Still having some postoperative pain but nothing concerning on exam.    Bariatrician -Dr. Jackson    Plan:      Cont diet advancements per post-op schedule  Cont fluid and protein goals  I will give her some tramadol to help with pain  Cont PPI for 3 months  Exercise - walking ok, may gradually advance activity in 1 week, no restrictions at 6 weeks  Follow-up as scheduled for 6 week post-op visit    Jarek Rueda MD, 10/02/24, 10:36 AM

## 2024-10-02 NOTE — PROGRESS NOTES
Provided/reviewed bariatric post-op orientation and Bariatric HELP card; instructed pt to aim for 64 ozs fluids/day; pt's current fluid intake ~ 48 ozs/day, will work up to 64 ozs/day; taking Bariatric Choice 4x/day; meds to take/avoid; activities/allowed/avoid; signs and symptoms of concern; contact info; also instructed pt to keep card in wallet and in the unlikely event pt ends up int ED/IC/UC to present card to MD and inform bariatric staff; pt agreed and verbalized understanding.

## 2024-10-10 NOTE — PROGRESS NOTES
Multiple attempts to reach the pt and messages left however unable to reach the pt. Past TCM timeframe, closing encounter.

## 2024-10-21 DIAGNOSIS — I10 HTN (HYPERTENSION), BENIGN: ICD-10-CM

## 2024-10-21 DIAGNOSIS — Z98.84 S/P LAPAROSCOPIC SLEEVE GASTRECTOMY: Primary | ICD-10-CM

## 2024-10-21 DIAGNOSIS — E66.01 MORBID OBESITY WITH BMI OF 40.0-44.9, ADULT (HCC): ICD-10-CM

## 2024-10-21 DIAGNOSIS — E11.9 TYPE 2 DIABETES MELLITUS WITHOUT COMPLICATION, WITHOUT LONG-TERM CURRENT USE OF INSULIN (HCC): ICD-10-CM

## 2024-10-23 ENCOUNTER — TELEMEDICINE (OUTPATIENT)
Dept: SURGERY | Facility: CLINIC | Age: 44
End: 2024-10-23
Payer: COMMERCIAL

## 2024-10-23 DIAGNOSIS — E66.01 MORBID OBESITY WITH BMI OF 40.0-44.9, ADULT (HCC): ICD-10-CM

## 2024-10-23 DIAGNOSIS — I10 HTN (HYPERTENSION), BENIGN: ICD-10-CM

## 2024-10-23 DIAGNOSIS — E11.9 TYPE 2 DIABETES MELLITUS WITHOUT COMPLICATION, WITHOUT LONG-TERM CURRENT USE OF INSULIN (HCC): Primary | ICD-10-CM

## 2024-10-23 PROCEDURE — 97803 MED NUTRITION INDIV SUBSEQ: CPT

## 2024-10-23 NOTE — PATIENT INSTRUCTIONS
Recommendations/goals:      1.  Restart a food record, My Net Diary, select the macros dashboard.  2.  Continue to consume at least 4-6 meals/snacks per day.  Include protein and produce when you eat.  Aim for 60-80 grams of protein per day.  Try to keep the carbohydrates at 50 grams per day or less.    3.  Continue to practice eating strategies, eat separately from drinking, avoid straws, chew food 20-30 times before swallowing.  Make the meals last 30 minutes.  4.  Continue to drink at least 64 oz per day of water.  (Try adding lemon, mint or fruit to water, Protein 2 O water, add True Lemon, Crystal Light, use a measured container).  5.  Avoid caffeine. Try herbal tea.  6.  Continue to exercise with a goal of 30 minutes per day for exercise (for example,walking).    7.  Add strength training at least 10 minutes 3 days per week in a couple weeks with Dr. Rueda's approval.  (7 minute workout song) or (Gym Rat no more-18 at home exercises)   8.  Switch to bariatric capsule with additional calcium citrate in a couple weeks with Dr. Rueda's approval.    Bariatric Vitamins  Both the sleeve gastrectomy and the cholo-en-y gastric bypass can alter your digestion enough that it can affect eh absorption ability of certain important nutrients.  Because of this, it is extremely important that:   You take a vitamin supplement, ideally one formulated for the surgery you have had, for the rest of your life.  You follow up to check levels of a number of these vitamins and minerals to help monitor for any deficiencies and to ensure the supplement you are on is working appropriately.  Every person is a bit different in terms of how the surgery affects the absorption of these various vitamins and micronutrients.  People also often get vitamins and nutrients in the food they eat as well.  When you come for your follow up visits, it is helpful to either bring the bottle of the vitamins you are taking or take a picture of them.  As you  will see, many of the companies/vitamins have very similar sounding names, and each company also has different vitamins available, so knowing which specific vitamin you are taking can help us ensure you are on the right one that will work for you.  We are providing options as we are not committed to any specific brand or vitamin but want to make sure you take an appropriate vitamin for your surgery.  The market is often changing and there are options that are not on this list.  If you are interested in taking a supplement not on the recommended list. Please call, message us, take a picture or bring the bottles of any vitamins or supplements you are taking to your visit to discuss.   Recommended Bariatric Vitamins  Right After Surgery, we recommend using a chewable, liquid, or meltable vitamin formulation.  Most of the chewable formulations are designed to be taken either 2, 3, or 4 times per day.  Some are once daily, but the concern with once daily vitamins is always whether the absorption is appropriate.  Our main recommendation is to take something you can take reliably and we check your levels and adjust accordingly.  Bariatric Choice All in one Chewable multivitamin and calcium citrate- 4 per day  Available at:  "[employer] (online)   or  [place] vitamin powder - 2 scoops per day  Available at: [employer], [employer]    6 Weeks After Surgery, if you are tolerating solid food well and not having swallowing issues, you may switch to a capsule vitamin.  If the chewable work for you, continue taking them, but for many patients, the capsules are easier.    [place] Complete Bariatric Vitamin Formula Multivitamin with Calcium and Iron - 6 tablets per day  Available at:[employer], "[employer]    The following capsules do not contain calcium. It is recommended to take an additional 0650-1172 mg of calcium citrate daily   Bariatric Advantage Ultra Multivitamin with Iron-3 capsules per day  Available  at: Bariatricadvantage.com, Amazon, Bariatric Pal        [place] with 45 mg Iron- 1 capsules per day  Available at:www.procarenow.com, Bariatric Pal online     Bariatric Fusion all in one with 45 mg of iron- 1 capsule per day     Available at Bariatric Fusion.com, Vitamin [employer], [facility]

## 2024-10-23 NOTE — PROGRESS NOTES
[facility] BAR[facility]ATR[facility] AND [facility] CLINIC  [address]  [facility] [address]  Dept: [phone]  Loc: [phone]  Yareli Lynch verbally consents to a telemedicine service with live, interactive video and audio on 10/23/2024.  Patient understands and accepts financial responsibility for any deductible, co-insurance and/or co-pays associated with this service.   10/23/24      Bariatric Follow-up Nutrition Session    Yareli Lynch is a 44 year old female.     Assessment     Procedure:  Vertical Sleeve Gastrectomy  Here for medical weight management: no  Revision:  no  Surgery Date:  9/23/24  Medical Diagnosis:  obesity grade III, HTN, PCOS, KELECHI    Labs:  Lab Results   Component Value Date     (H) 09/24/2024    BUN 7 (L) 09/24/2024    BUNCREA 9.2 (L) 09/24/2024    CREATSERUM 0.76 09/24/2024    ANIONGAP 5 09/24/2024    GFRNAA 86 02/28/2022    GFRAA 99 02/28/2022    CA 9.2 09/24/2024    OSMOCALC 283 09/24/2024    ALKPHO 66 09/07/2023    AST 13 (L) 09/07/2023    ALT 19 09/07/2023    BILT 0.2 09/07/2023    TP 7.3 09/07/2023    ALB 3.3 (L) 09/07/2023    GLOBULIN 4.0 09/07/2023     09/24/2024    K 4.3 09/24/2024     09/24/2024    CO2 26.0 09/24/2024     No results found for: \"MG\"   No results found for: \"PHOS\"    Thyroid:    Lab Results   Component Value Date    TSH 1.360 09/07/2023    TSH 1.010 09/22/2021       Iron Panel:  Lab Results   Component Value Date    IRON 31 (L) 09/07/2023    TRANSFERRIN 359 09/07/2023    TIBCP 535 (H) 09/07/2023    SAT 6 (L) 09/07/2023       CBC:  Lab Results   Component Value Date    WBC 10.5 09/24/2024    WBC 8.1 09/18/2024    WBC 13.0 (H) 01/17/2024     Lab Results   Component Value Date    HGB 11.8 (L) 09/24/2024    HGB 11.9 (L) 09/18/2024    HGB 10.8 (L) 01/17/2024      Lab Results   Component Value Date    .0 09/24/2024    .0 09/18/2024    .0 01/17/2024       Diabetes:    Lab Results   Component Value Date      09/24/2024    A1C 5.5 09/24/2024       Lipid Panel:  Lab Results   Component Value Date    CHOLEST 160 01/14/2024    TRIG 77 01/14/2024    HDL 49 01/14/2024    LDL 96 01/14/2024    VLDL 13 01/14/2024    NONHDLC 111 01/14/2024        Vitamins/Minerals:  Lab Results   Component Value Date    B12 377 09/07/2023     Lab Results   Component Value Date    VITD 22.2 (L) 09/07/2023     No results found for: \"THIAMINE\"   Lab Results   Component Value Date/Time    VITB1 96.2 09/07/2023 03:17 PM     Lab Results   Component Value Date/Time    FOLIC 14.2 09/07/2023 03:17 PM        Meds:     Current Outpatient Medications:     traMADol 50 MG Oral Tab, Take 1 tablet (50 mg total) by mouth every 6 (six) hours as needed for Pain., Disp: 12 tablet, Rfl: 0    pantoprazole 40 MG Oral Tab EC, Take 1 tablet (40 mg total) by mouth every morning before breakfast., Disp: 30 tablet, Rfl: 2    losartan 50 MG Oral Tab, Take 1 tablet (50 mg total) by mouth daily., Disp: 30 tablet, Rfl: 0    dilTIAZem 60 MG Oral Tab, Take 1 tablet (60 mg total) by mouth 3 (three) times daily., Disp: 90 tablet, Rfl: 0    albuterol 108 (90 Base) MCG/ACT Inhalation Aero Soln, Inhale 1-2 puffs into the lungs every 4 to 6 hours as needed for Wheezing., Disp: 18 g, Rfl: 3    albuterol (2.5 MG/3ML) 0.083% Inhalation Nebu Soln, Take 3 mL (2.5 mg total) by nebulization every 4 (four) hours as needed for Wheezing or Shortness of Breath., Disp: 120 mL, Rfl: 0    Height:    Ht Readings from Last 1 Encounters:   10/02/24 5' 3\" (1.6 m)     Weight:    Wt Readings from Last 6 Encounters:   10/02/24 265 lb (120.2 kg)   09/23/24 275 lb (124.7 kg)   08/27/24 281 lb (127.5 kg)   07/24/24 281 lb (127.5 kg)   05/29/24 284 lb (128.8 kg)   05/09/24 288 lb (130.6 kg)       Weight change:  down 17 lbs since day of surgery per pt stated weight of 258 lbs this past Sunday  Post-Op Excess Body Weight Loss: 12.6% EBWL  BMI: There is no height or weight on file to calculate  BMI.    Protein Intake: 81-85 grams/day  Carbs:unsure not keeping a food record  Fluid intake:  64 ounces/day    Diet history:       Breakfast      AM Snack       Lunch     PM Snack     Dinner   Skips 1/7 days or Soft boiled egg and cream of wheat and oatmeal half cup cinnamon or Orgain protein shake Peanut butter and banana Roast chicken 1 oz Roast chicken 1 oz and avocado mash and cheese and after dinner protein shake Pot roast -0.5-1 oz with potato and carrots               Total Calories:  unsure  Excessive in: nothing  Inadequate in:  nothing     Patient has made some modifications and adjustments to diet: yes, per pt is eating separately from drinking-waiting 30 minutes after eating to drink, is chewing food well before swallowing, eating more slowly, is eating smaller portions, is eating more protein, is eating at least 5 times per day.   Food intolerances:  no  Vitamin/mineral supplements:  Bariatric Choice 3  Protein supplements:  Other Orgain, Protein water and Premier Protein     Activity Level: active  Type: walk  Duration: 30 minutes  Frequency: per day    Other: Met with pt for 1 month post op visit.  Pt is 12.6% EBWL s/p VSG with Dr. Rueda.  Per pt is not keeping a food record. Per diet recall pt is consuming ~81-85 gm protein per day.   Current diet is meeting protein needs, pt encouraged to restart a food record to ensure is meeting protein needs while keeping carbs appropriately low.  Pt verbalized understanding.  Per pt is taking bariatric vitamin daily, 3 per day.  Encouraged pt to crush and add to food to get in 4 per day.  Pt verbalized understanding.  Per pt is tolerating all foods.   Per pt is eating separately from drinking-waiting 30 minutes after eating to drink, is chewing food well before swallowing, eating more slowly, is eating smaller portions, is eating more protein, is eating at least 5 times per day. Per pt is exercising regularly, walking 30 minutes per day.  Per pt is not  currently doing strength training, will plan in another couple of weeks to begin with Dr. Rueda's approval.  Pt with follow up visit scheduled for 4 months post op.       Nutrition Diagnosis     Nutrition Diagnosis: Morbid obesity: Improvement shown     Intervention     Recommendations/goals:      1.  Restart a food record, My Net Diary, select the macros dashboard.  2.  Continue to consume at least 4-6 meals/snacks per day.  Include protein and produce when you eat.  Aim for 60-80 grams of protein per day.  Try to keep the carbohydrates at 50 grams per day or less.    3.  Continue to practice eating strategies, eat separately from drinking, avoid straws, chew food 20-30 times before swallowing.  Make the meals last 30 minutes.  4.  Continue to drink at least 64 oz per day of water.  (Try adding lemon, mint or fruit to water, Protein 2 O water, add True Lemon, Crystal Light, use a measured container).  5.  Avoid caffeine. Try herbal tea.  6.  Continue to exercise with a goal of 30 minutes per day for exercise (for example,walking).    7.  Add strength training at least 10 minutes 3 days per week in a couple weeks with Dr. Rueda's approval.  (7 minute workout song) or (Gym Rat no more-18 at home exercises)   8.  Switch to bariatric capsule with additional calcium citrate in a couple weeks with Dr. Rueda's approval.        Monitor/Evaluate     Anthropometric measurements, Food/fluid intake/choices, Food intolerances, Activity level, Vitamin/mineral supplementation, Reinforce goals, and Calorie/protein intake  Additional RD visits required to review concepts? yes  Patient understands protein requirements? yes  Patient understand fluid requirements (amount and method of intake)? yes  Patient understands post-operative diet? yes  Patient keeping consistent food records? no  Patient ready for Liquid Protein Education? N/a      Alda Parra RD, LDN    Face-to-face time spent with pt: 24 minutes

## 2024-10-24 ENCOUNTER — TELEPHONE (OUTPATIENT)
Facility: CLINIC | Age: 44
End: 2024-10-24

## 2024-10-24 DIAGNOSIS — I25.2 HISTORY OF NON-ST ELEVATION MYOCARDIAL INFARCTION (NSTEMI): Primary | ICD-10-CM

## 2024-10-24 DIAGNOSIS — I10 HTN (HYPERTENSION), BENIGN: ICD-10-CM

## 2024-10-24 DIAGNOSIS — K58.9 IRRITABLE BOWEL SYNDROME, UNSPECIFIED TYPE: ICD-10-CM

## 2024-10-24 RX ORDER — DILTIAZEM HCL 60 MG
60 TABLET ORAL 3 TIMES DAILY
Qty: 90 TABLET | Refills: 0 | Status: SHIPPED | OUTPATIENT
Start: 2024-10-24 | End: 2024-10-28

## 2024-10-24 RX ORDER — PANTOPRAZOLE SODIUM 40 MG/1
40 TABLET, DELAYED RELEASE ORAL
Qty: 30 TABLET | Refills: 2 | Status: SHIPPED | OUTPATIENT
Start: 2024-10-24

## 2024-10-24 RX ORDER — LOSARTAN POTASSIUM 50 MG/1
50 TABLET ORAL DAILY
Qty: 30 TABLET | Refills: 0 | Status: SHIPPED | OUTPATIENT
Start: 2024-10-24

## 2024-10-24 NOTE — TELEPHONE ENCOUNTER
Left message for patient to call back and discuss on home and cell. Voicemail did not identify name/number.         Munaxhart message sent as well.

## 2024-10-24 NOTE — TELEPHONE ENCOUNTER
Outgoing call to patient to reschedule an appointment for 10/25/24 due to provider not being available. Patient stated that she will be running out of her medication prescribed to her at her ER visit. Appointment was rescheduled and call was routed to the nurse line for further assistance.     No further action is required at this time.

## 2024-10-24 NOTE — TELEPHONE ENCOUNTER
Patient was scheduled for 10/25/24 for a hospital follow up with Dr. Ledesma but it was canceled. She will be out of 3 medications that were prescribed while she was in patient. She needs these meds and cannot wait for her follow up she now has with Dr. Ledesma on 11/11/24 as she will be out of the pills. Scheduled for 10/28/24 at 4 pm with Dr. Winchester    Left a detailed message of the scheduled appointment for 10/28/24 and canceled the appointment with Dr. Ledesma        She needs Diltazem HCL 60 mg 3 times daily, Losartan Potassim 50 mg daily and Pantoprazole 40 mg daily as they only gave her a 30 day supply    Please review pended meds (only a 30 day supply)

## 2024-10-25 NOTE — TELEPHONE ENCOUNTER
Spoke with Patient. Verified name and date of birth.  Informed her medications were sent to [facility] pharmacy.  Verbalized understanding.

## 2024-10-28 ENCOUNTER — OFFICE VISIT (OUTPATIENT)
Facility: CLINIC | Age: 44
End: 2024-10-28
Payer: COMMERCIAL

## 2024-10-28 VITALS
WEIGHT: 254 LBS | OXYGEN SATURATION: 98 % | HEIGHT: 63 IN | BODY MASS INDEX: 45 KG/M2 | HEART RATE: 66 BPM | SYSTOLIC BLOOD PRESSURE: 126 MMHG | DIASTOLIC BLOOD PRESSURE: 82 MMHG

## 2024-10-28 DIAGNOSIS — I10 ESSENTIAL HYPERTENSION: Primary | ICD-10-CM

## 2024-10-28 DIAGNOSIS — D22.9 SKIN MOLE: ICD-10-CM

## 2024-10-28 DIAGNOSIS — E11.9 TYPE 2 DIABETES MELLITUS WITHOUT COMPLICATION, WITHOUT LONG-TERM CURRENT USE OF INSULIN (HCC): ICD-10-CM

## 2024-10-28 LAB
CREAT UR-SCNC: 333.3 MG/DL
MICROALBUMIN UR-MCNC: 1.5 MG/DL
MICROALBUMIN/CREAT 24H UR-RTO: 4.5 UG/MG (ref ?–30)

## 2024-10-28 PROCEDURE — 99214 OFFICE O/P EST MOD 30 MIN: CPT | Performed by: FAMILY MEDICINE

## 2024-10-28 PROCEDURE — 82043 UR ALBUMIN QUANTITATIVE: CPT | Performed by: FAMILY MEDICINE

## 2024-10-28 PROCEDURE — 82570 ASSAY OF URINE CREATININE: CPT | Performed by: FAMILY MEDICINE

## 2024-10-28 PROCEDURE — 3074F SYST BP LT 130 MM HG: CPT | Performed by: FAMILY MEDICINE

## 2024-10-28 PROCEDURE — 3044F HG A1C LEVEL LT 7.0%: CPT | Performed by: FAMILY MEDICINE

## 2024-10-28 PROCEDURE — 3008F BODY MASS INDEX DOCD: CPT | Performed by: FAMILY MEDICINE

## 2024-10-28 PROCEDURE — 3079F DIAST BP 80-89 MM HG: CPT | Performed by: FAMILY MEDICINE

## 2024-10-28 RX ORDER — DILTIAZEM HYDROCHLORIDE 240 MG/1
240 CAPSULE, COATED, EXTENDED RELEASE ORAL DAILY
Qty: 90 CAPSULE | Refills: 1 | Status: SHIPPED | OUTPATIENT
Start: 2024-10-28

## 2024-10-28 NOTE — PROGRESS NOTES
HPI:    Patient ID: Yareli Lynch is a 44 year old female who presents for hospital f/u.    HPI  Had sleeve gastrectomy on 9/23/24.   Feeling improved overall.     HTN:   Meds were adjusted during hospital stay.   Now on diltiazem 60mg TID and losartan 50mg daily.   Not taking hydrochlorothiazide.     Past Medical History:    Allergic rhinitis    Anxiety    Asthma (HCC)    DVT (deep vein thrombosis) in pregnancy ([facility])    Encounter for therapeutic drug monitoring    Essential hypertension    Gestational diabetes (HCC)    High blood pressure    High myopia    History of IBS    History of PCOS    Hx of motion sickness    Hypertension    IBS (irritable bowel syndrome)    Infertility, female    Morbid obesity with BMI of 45.0-49.9, adult ([facility])    Obesity    Prediabetes    PTSD (post-traumatic stress disorder)    S/P laparoscopic sleeve gastrectomy    Sleep apnea    Visual impairment    wers glasses        Current Outpatient Medications   Medication Sig Dispense Refill    dilTIAZem  MG Oral Capsule SR 24 Hr Take 1 capsule (240 mg total) by mouth daily. 90 capsule 1    losartan 50 MG Oral Tab Take 1 tablet (50 mg total) by mouth daily. 30 tablet 0    pantoprazole 40 MG Oral Tab EC Take 1 tablet (40 mg total) by mouth every morning before breakfast. 30 tablet 2    albuterol 108 (90 Base) MCG/ACT Inhalation Aero Soln Inhale 1-2 puffs into the lungs every 4 to 6 hours as needed for Wheezing. 18 g 3    albuterol (2.5 MG/3ML) 0.083% Inhalation Nebu Soln Take 3 mL (2.5 mg total) by nebulization every 4 (four) hours as needed for Wheezing or Shortness of Breath. 120 mL 0        Allergies[1]    Review of Systems   Constitutional: Negative.    Respiratory: Negative.     Cardiovascular: Negative.    Gastrointestinal: Negative.    Neurological: Negative.    All other systems reviewed and are negative.           /82   Pulse 66   Ht 5' 3\" (1.6 m)   Wt 254 lb (115.2 kg)   LMP 08/26/2024   SpO2 98%   BMI 44.99 kg/m²      PHYSICAL EXAM:   Physical Exam  Constitutional:       General: She is not in acute distress.     Appearance: Normal appearance. She is well-developed. She is not ill-appearing, toxic-appearing or diaphoretic.   HENT:      Head: Normocephalic and atraumatic.   Eyes:      Extraocular Movements: Extraocular movements intact.      Conjunctiva/sclera: Conjunctivae normal.   Cardiovascular:      Rate and Rhythm: Normal rate and regular rhythm.      Heart sounds: Normal heart sounds. No murmur heard.     No friction rub. No gallop.   Pulmonary:      Effort: Pulmonary effort is normal. No respiratory distress.      Breath sounds: Normal breath sounds. No wheezing or rales.   Musculoskeletal:      Cervical back: Neck supple.      Right lower leg: No edema.      Left lower leg: No edema.   Lymphadenopathy:      Cervical: No cervical adenopathy.   Skin:     General: Skin is warm and dry.      Capillary Refill: Capillary refill takes less than 2 seconds.   Neurological:      General: No focal deficit present.      Mental Status: She is alert.   Psychiatric:         Mood and Affect: Mood normal.         Behavior: Behavior normal.         Thought Content: Thought content normal.         Judgment: Judgment normal.             ASSESSMENT/PLAN:     Encounter Diagnoses   Name Primary?    Essential hypertension Yes    Type 2 diabetes mellitus without complication, without long-term current use of insulin (HCC)     Skin mole        1. Essential hypertension  -Well-controlled.  -Will resume ER formulation of diltiazem. Discontinue IR. Continue losartan 50mg daily.   -Resume home BP monitoring and will send BP log 3 weeks after transition.   -Tentative plan for f/u & annual visit in 03/2025.     2. Type 2 diabetes mellitus without complication, without long-term current use of insulin (HCC)  - Microalb/Creat Ratio, Random Urine  -Recent A1c reviewed and normal.   -Diet-controlled.   -Check urine MCR today.  -Plan to update A1c at  annual visit in 03/2025.     3. Skin mole  - Derm Referral - In Network  -Derm referral re-generated.     Meds This Visit:  Requested Prescriptions     Signed Prescriptions Disp Refills    dilTIAZem  MG Oral Capsule SR 24 Hr 90 capsule 1     Sig: Take 1 capsule (240 mg total) by mouth daily.       Imaging & Referrals:  DERM - INTERNAL       Yo Winchester,   ID#[id number]         [1]   Allergies  Allergen Reactions    Lisinopril Coughing and UNKNOWN

## 2024-11-04 NOTE — Clinical Note
Continue care with Dr. Tejeda  Check glucoses 4x/day and review with MFM weekly.   Will consider low dose Levemir if her FBS remain elevated  Monthly growth and biophysical profile ultrasound  Weekly NST, increased to twice weekly at  34  wks    Altered nutrition related lab values evidenced by elevated Na, Cl, Glu and low Alb related to CHARISSE, DM and other medical problems.

## 2024-11-06 ENCOUNTER — OFFICE VISIT (OUTPATIENT)
Dept: SURGERY | Facility: CLINIC | Age: 44
End: 2024-11-06
Payer: COMMERCIAL

## 2024-11-06 VITALS
DIASTOLIC BLOOD PRESSURE: 80 MMHG | WEIGHT: 251 LBS | SYSTOLIC BLOOD PRESSURE: 132 MMHG | BODY MASS INDEX: 44.47 KG/M2 | HEART RATE: 68 BPM | OXYGEN SATURATION: 99 % | HEIGHT: 63 IN

## 2024-11-06 DIAGNOSIS — Z98.84 S/P LAPAROSCOPIC SLEEVE GASTRECTOMY: Primary | ICD-10-CM

## 2024-11-06 NOTE — PROGRESS NOTES
[facility], [facility], [place]  [address]  [facility] [address]  Dept: [phone]    2024   Bariatric Surgery Patient Evaluation    Chief Complaint:  morbid obesity, preop 281, sleeve  24    History of Present Illness:  Yareli Lynch is a 44 year old female presenting for surgical follow-up.  Today she weighs 251 pounds which is 14 fewer than last visit.  She made it through the diet stages without any major issues.  She admits to inconsistent logging of food but last time she logged a few weeks ago she was at 500 nina.    Past Medical History:    Past Medical History:    Allergic rhinitis    Anxiety    Asthma ([facility])    DVT (deep vein thrombosis) in pregnancy ([facility])    Encounter for therapeutic drug monitoring    Essential hypertension    Gestational diabetes ([facility])    High blood pressure    High myopia    History of IBS    History of PCOS    Hx of motion sickness    Hypertension    IBS (irritable bowel syndrome)    Infertility, female    Morbid obesity with BMI of 45.0-49.9, adult (HCC)    Obesity    Prediabetes    PTSD (post-traumatic stress disorder)    S/P laparoscopic sleeve gastrectomy    Sleep apnea    Visual impairment    wers glasses       Past Surgical History:    Past Surgical History:   Procedure Laterality Date      2019, 2021    Colonoscopy      Colonoscopy  2024    ; colon polyps, hemorrhoids    Colonoscopy N/A 2024    Procedure: COLONOSCOPY;  Surgeon: Olive Ny MD;  Location: [facility] ENDOSCOPY    Egd  2024    ; gastric polyps    Ivf package  2018    Other surgical history      egg retrieval for IVF       Family History:    Family History   Problem Relation Age of Onset    Thyroid Disorder Father     Diabetes Father     Diabetes Mother     Hypertension Mother     No Known Problems Maternal Grandmother     No Known Problems Maternal Grandfather      Glaucoma Neg     Retinal detachment Neg     Macular degeneration Neg        Social History:    Social History     Socioeconomic History    Marital status:    Occupational History    Occupation: health    Tobacco Use    Smoking status: Former    Smokeless tobacco: Never    Tobacco comments:     On and off   Vaping Use    Vaping status: Never Used   Substance and Sexual Activity    Alcohol use: No    Drug use: No    Sexual activity: Yes     Partners: Female     Comment: with wife    Other Topics Concern    Blood Transfusions No       Medications:   Current Outpatient Medications:     dilTIAZem  MG Oral Capsule SR 24 Hr, Take 1 capsule (240 mg total) by mouth daily., Disp: 90 capsule, Rfl: 1    losartan 50 MG Oral Tab, Take 1 tablet (50 mg total) by mouth daily., Disp: 30 tablet, Rfl: 0    pantoprazole 40 MG Oral Tab EC, Take 1 tablet (40 mg total) by mouth every morning before breakfast., Disp: 30 tablet, Rfl: 2    albuterol 108 (90 Base) MCG/ACT Inhalation Aero Soln, Inhale 1-2 puffs into the lungs every 4 to 6 hours as needed for Wheezing., Disp: 18 g, Rfl: 3    albuterol (2.5 MG/3ML) 0.083% Inhalation Nebu Soln, Take 3 mL (2.5 mg total) by nebulization every 4 (four) hours as needed for Wheezing or Shortness of Breath., Disp: 120 mL, Rfl: 0     Allergies:    Allergies   Allergen Reactions    Lisinopril Coughing and UNKNOWN       ROS:      Constitutional: negative  HEENT: negative  Respiratory: negative  Cardiovascular: negative  Gastrointestinal: negative  Genitourinary: negative  Musculoskeletal: negative  Neurological: negative  Psychological: negative  Endocrine: negative  Immunologic: negative  Integumentary: negative      Physical Exam:  Vital Signs:  /80   Pulse 68   Ht 5' 3\" (1.6 m)   Wt 251 lb (113.9 kg)   LMP 08/26/2024   SpO2 99%   BMI 44.46 kg/m²   BMI:  Body mass index is 44.46 kg/m².  General: no acute distress, well-nourished  HENT: normocephalic, atraumatic  Lung:  breathing comfortably, symmetrical expansion, no wheezing  Heart: regular rate and rhythm  Abdomen: soft, nontender, non-distended, no hernia/mass/organomegaly, incisions healed  Extremities: normal strength, normal ROM, walks without assist device  Neuro: no focal deficits, cranial nerves grossly intact  Psych: alert and oriented, normal affect  Skin: warm, dry      Assessment: 44 year old female with chronic morbid obesity who would benefit from significant and sustained weight loss, now doing well after sleeve gastrectomy.      Bariatrician -Dr. Jackson    Plan:      Cont portion controlled meals, needs to start logging foods more consistently again  Cont fluid and protein goals  Cont PPI for total of 3 months  Exercise - may increase activity, no restrictions now  No longer has to crush pills or use chewable tablets  Follow-up as scheduled for 3 month post-op visit    Jarek Rueda MD, 11/06/24, 10:38 AM

## 2024-11-27 DIAGNOSIS — I10 HTN (HYPERTENSION), BENIGN: ICD-10-CM

## 2024-12-02 RX ORDER — LOSARTAN POTASSIUM 50 MG/1
50 TABLET ORAL DAILY
Qty: 90 TABLET | Refills: 3 | Status: SHIPPED | OUTPATIENT
Start: 2024-12-02

## 2024-12-02 NOTE — TELEPHONE ENCOUNTER
On 10/24/2024 we sent Quantity: 30 with 0 refills for lack of appointment   Patient had an appointment with Dr. Winchester on 10/28/2024 for hypertension, Type 2 diabetes mellitus without complication, without long-term current use of insulin, and skin mole.     Notes from visit on 10/28/2024  HTN:   Meds were adjusted during hospital stay.   Now on diltiazem 60mg TID and losartan 50mg daily.   Not taking hydrochlorothiazide.     1. Essential hypertension  -Well-controlled.  -Will resume ER formulation of diltiazem. Discontinue IR. Continue losartan 50mg daily.   -Resume home BP monitoring and will send BP log 3 weeks after transition.   -Tentative plan for f/u & annual visit in 03/2025.

## 2024-12-02 NOTE — TELEPHONE ENCOUNTER
Refill Per Protocol     Requested Prescriptions   Pending Prescriptions Disp Refills    LOSARTAN 50 MG Oral Tab [Pharmacy Med Name: LOSARTAN POTASSIUM 50 MG TAB] 30 tablet 0     Sig: TAKE 1 TABLET BY MOUTH EVERY DAY       Hypertension Medications Protocol Passed - 12/2/2024 10:12 AM        Passed - CMP or BMP in past 12 months        Passed - Last BP reading less than 140/90     BP Readings from Last 1 Encounters:   11/06/24 132/80               Passed - In person appointment or virtual visit in the past 12 mos or appointment in next 3 mos     Recent Outpatient Visits              [facility]    [facility]    1 month ago Essential hypertension    [facility] Yo Winchester DO    Office Visit    1 month ago Type 2 diabetes mellitus without complication, without long-term current use of insulin (HCC) [E[facility]    2 months ago [facility]P [facility] gast[facility]    [facility] Visit    3 months ago Type 2 diabetes mellitus without complication, without long-term current use of insulin (HCC)    [facility]    [facility]icine          Future Appointments         Provider Department Appt Notes    In 1 month [facility]    In 2 months [facility]                    Passed - EGFRCR or GFRNAA > 50     GFR Evaluation  EGFRCR: 98 , resulted on 9/24/2024                 Future Appointments         Provider Department Appt Notes    In 1 month [facility]     In 2 months [facility]          Recent Outpatient Visits              3 weeks ago [facility]    [facility]    1 month ago Essential hypertension    [facility] Yo Winchester DO    Office Visit    1 month ago Type 2 diabetes mellitus without complication, without long-term current use of insulin (HCC) [E11.9]    [facility] [place] DaisyAlda Pascual RD    Telemedicine    2 months ago S[facility]P laparoscopic sleeve gastrectomy    [facility]    [facility] Visit    3 months ago Type 2 diabetes mellitus without complication, without long-term current use of insulin (HCC)    [facility]    Telemedicine

## 2025-01-22 ENCOUNTER — OFFICE VISIT (OUTPATIENT)
Dept: SURGERY | Facility: CLINIC | Age: 45
End: 2025-01-22
Payer: COMMERCIAL

## 2025-01-22 VITALS
HEART RATE: 70 BPM | BODY MASS INDEX: 41.99 KG/M2 | SYSTOLIC BLOOD PRESSURE: 128 MMHG | DIASTOLIC BLOOD PRESSURE: 80 MMHG | HEIGHT: 63 IN | OXYGEN SATURATION: 100 % | WEIGHT: 237 LBS

## 2025-01-22 DIAGNOSIS — E66.01 MORBID OBESITY WITH BMI OF 50.0-59.9, ADULT (HCC): Primary | ICD-10-CM

## 2025-01-22 DIAGNOSIS — Z98.84 S/P LAPAROSCOPIC SLEEVE GASTRECTOMY: ICD-10-CM

## 2025-01-22 DIAGNOSIS — E66.01 MORBID (SEVERE) OBESITY DUE TO EXCESS CALORIES (HCC): ICD-10-CM

## 2025-01-22 NOTE — PROGRESS NOTES
[facility], [facility], [place]  [address]  [facility] [address]  Dept: [phone]    2025    Bariatric Surgery Patient Evaluation    Chief Complaint:  morbid obesity, preop 281, sleeve 24    History of Present Illness:  Yareli Lynch is a 44 year old female presenting for surgical follow-up.  Today she weighs 237 pounds which is 14 fewer than last visit.  She has been logging food more often.  Calories she estimates in the 7716-8589 /day range.  Walking at work 30 min a day and doing water aerobics once a week for exercise.    Past Medical History:    Past Medical History:    Allergic rhinitis    Anxiety    Asthma ([facility])    DVT (deep vein thrombosis) in pregnancy ([facility])    Encounter for therapeutic drug monitoring    Essential hypertension    Gestational diabetes ([facility])    High blood pressure    High myopia    History of IBS    History of PCOS    Hx of motion sickness    Hypertension    IBS (irritable bowel syndrome)    Infertility, female    Morbid obesity with BMI of 45.0-49.9, adult ([facility])    Obesity    Prediabetes    PTSD (post-traumatic stress disorder)    S/P laparoscopic sleeve gastrectomy    Sleep apnea    Visual impairment    wers glasses       Past Surgical History:    Past Surgical History:   Procedure Laterality Date      2019, 2021    Colonoscopy      Colonoscopy  2024    ; colon polyps, hemorrhoids    Colonoscopy N/A 2024    Procedure: COLONOSCOPY;  Surgeon: Olive Ny MD;  Location: [facility] ENDOSCOPY    Egd  2024    ; gastric polyps    Ivf package  2018    Other surgical history      egg retrieval for IVF       Family History:    Family History   Problem Relation Age of Onset    Thyroid Disorder Father     Diabetes Father     Diabetes Mother     Hypertension Mother     No Known Problems Maternal Grandmother     No Known Problems Maternal Grandfather      Glaucoma Neg     Retinal detachment Neg     Macular degeneration Neg        Social History:    Social History     Socioeconomic History    Marital status:    Occupational History    Occupation: health    Tobacco Use    Smoking status: Former    Smokeless tobacco: Never    Tobacco comments:     On and off   Vaping Use    Vaping status: Never Used   Substance and Sexual Activity    Alcohol use: No    Drug use: No    Sexual activity: Yes     Partners: Female     Comment: with wife    Other Topics Concern    Blood Transfusions No       Medications:   Current Outpatient Medications:     losartan 50 MG Oral Tab, Take 1 tablet (50 mg total) by mouth daily., Disp: 90 tablet, Rfl: 3    dilTIAZem  MG Oral Capsule SR 24 Hr, Take 1 capsule (240 mg total) by mouth daily., Disp: 90 capsule, Rfl: 1    pantoprazole 40 MG Oral Tab EC, Take 1 tablet (40 mg total) by mouth every morning before breakfast., Disp: 30 tablet, Rfl: 2    albuterol 108 (90 Base) MCG/ACT Inhalation Aero Soln, Inhale 1-2 puffs into the lungs every 4 to 6 hours as needed for Wheezing., Disp: 18 g, Rfl: 3    albuterol (2.5 MG/3ML) 0.083% Inhalation Nebu Soln, Take 3 mL (2.5 mg total) by nebulization every 4 (four) hours as needed for Wheezing or Shortness of Breath., Disp: 120 mL, Rfl: 0     Allergies:    Allergies   Allergen Reactions    Lisinopril Coughing and UNKNOWN       ROS:      Constitutional: negative  HEENT: negative  Respiratory: negative  Cardiovascular: negative  Gastrointestinal: negative  Genitourinary: negative  Musculoskeletal: negative  Neurological: negative  Psychological: negative  Endocrine: negative  Immunologic: negative  Integumentary: negative      Physical Exam:  Vital Signs:  /80   Pulse 70   Ht 5' 3\" (1.6 m)   Wt 237 lb (107.5 kg)   LMP 08/26/2024   SpO2 100%   BMI 41.98 kg/m²   BMI:  Body mass index is 41.98 kg/m².  General: no acute distress, well-nourished  HENT: normocephalic,  atraumatic  Lung: breathing comfortably, symmetrical expansion, no wheezing  Heart: regular rate and rhythm  Abdomen: soft, nontender, non-distended, no hernia/mass/organomegaly, incisions healed  Extremities: normal strength, normal ROM, walks without assist device  Neuro: no focal deficits, cranial nerves grossly intact  Psych: alert and oriented, normal affect  Skin: warm, dry      Assessment: 44 year old female with chronic morbid obesity who would benefit from significant and sustained weight loss, now doing well after sleeve gastrectomy.      Bariatrician -Dr. Jackson    Plan:      Cont portion controlled meals, calories need to be less, 600-900 more appropriate  Cont fluid and protein goals  PPI done  Exercise - continue to increase as tolerated, she thinks she can do weights at work  Follow-up as scheduled for 6 month post-op visit    Jarek Rueda MD, 01/22/25, 9:46 AM

## 2025-01-23 ENCOUNTER — DOCUMENTATION ONLY (OUTPATIENT)
Dept: SURGERY | Facility: CLINIC | Age: 45
End: 2025-01-23

## 2025-01-23 NOTE — PROGRESS NOTES
Appointment audit/chart review phone call per protocol.   Per records, patient has appointments as follows:  Surgeon 01/21/2025  Dietician 10/23/2024  Bariatrician 02/13/2025  Patient was here to see Dr Rueda pt confirmed she has not been hospitalized or visited any ED since surgery.   
26-May-2024 07:18

## 2025-05-08 DIAGNOSIS — E11.9 TYPE 2 DIABETES MELLITUS WITHOUT COMPLICATION, WITHOUT LONG-TERM CURRENT USE OF INSULIN (HCC): ICD-10-CM

## 2025-05-08 RX ORDER — SEMAGLUTIDE 1.34 MG/ML
1 INJECTION, SOLUTION SUBCUTANEOUS
Refills: 3 | OUTPATIENT
Start: 2025-05-08

## (undated) DEVICE — SOLUTION IRRIG 1000ML 0.9% NACL USP BTL

## (undated) DEVICE — MEDI-VAC NON-CONDUCTIVE SUCTION TUBING 6MM X 1.8M (6FT.) L: Brand: CARDINAL HEALTH

## (undated) DEVICE — PENCIL ES BTTN SWCH W/ TIP HOLSTER E-Z CLN

## (undated) DEVICE — UNIVERSAL STAPLER: Brand: ENDO GIA ULTRA

## (undated) DEVICE — STERILE POLYISOPRENE POWDER-FREE SURGICAL GLOVES: Brand: PROTEXIS

## (undated) DEVICE — TROCARS: Brand: KII® OPTICAL ACCESS SYSTEM

## (undated) DEVICE — YANKAUER,BULB TIP,W/O VENT,RIGID,STERILE: Brand: MEDLINE

## (undated) DEVICE — [HIGH FLOW INSUFFLATOR,  DO NOT USE IF PACKAGE IS DAMAGED,  KEEP DRY,  KEEP AWAY FROM SUNLIGHT,  PROTECT FROM HEAT AND RADIOACTIVE SOURCES.]: Brand: PNEUMOSURE

## (undated) DEVICE — GLOVE SUR 7.5 SENSICARE PI PIP CRM PWD F

## (undated) DEVICE — ADHESIVE SKIN TOP FOR WND CLSR DERMBND ADV

## (undated) DEVICE — MARYLAND JAW LAPAROSCOPIC SEALER/DIVIDER COATED: Brand: LIGASURE

## (undated) DEVICE — LASSO POLYPECTOMY SNARE: Brand: LASSO

## (undated) DEVICE — MAT TRNSF 34X78IN C1200LB NYL POLYPR FBR

## (undated) DEVICE — BLACK INTELLIGENT RELOAD: Brand: TRI-STAPLE 2.0

## (undated) DEVICE — Device: Brand: SUTURE PASSOR PRO

## (undated) DEVICE — SUT PDS II 1 27IN CT-1 ABSRB VLT L36MM 1/2

## (undated) DEVICE — MEDI-VAC NON-CONDUCTIVE SUCTION TUBING: Brand: CARDINAL HEALTH

## (undated) DEVICE — TROCAR: Brand: KII FIOS FIRST ENTRY

## (undated) DEVICE — GLOVE SUR 7 SENSICARE PI PIP CRM PWD F

## (undated) DEVICE — Device: Brand: JELCO

## (undated) DEVICE — SUTURE PLAIN GUT 3-0 CT-1

## (undated) DEVICE — 3M™ STERI-DRAPE™ INSTRUMENT POUCH 1018L: Brand: STERI-DRAPE™

## (undated) DEVICE — VISIGI 3D®  CALIBRATION SYSTEM  SIZE 40FR STD W/ BULB: Brand: BOEHRINGER® VISIGI 3D™ SLEEVE GASTRECTOMY CALIBRATION SYSTEM, SIZE 40FR W/BULB

## (undated) DEVICE — SHEET,DRAPE,53X77,STERILE: Brand: MEDLINE

## (undated) DEVICE — UNDYED BRAIDED (POLYGLACTIN 910), SYNTHETIC ABSORBABLE SUTURE: Brand: COATED VICRYL

## (undated) DEVICE — KIT ENDO ORCAPOD 160/180/190

## (undated) DEVICE — TRAP POLYP W/ 2 SPEC TY CLR MAGNIFYING WIND

## (undated) DEVICE — 60 ML SYRINGE REGULAR TIP: Brand: MONOJECT

## (undated) DEVICE — GIJAW SINGLE-USE BIOPSY FORCEPS WITH NEEDLE: Brand: GIJAW

## (undated) DEVICE — Device: Brand: DUAL NARE NASAL CANNULAE FEMALE LUER CON 7FT O2 TUBE

## (undated) DEVICE — CONMED SCOPE SAVER BITE BLOCK, 20X27 MM: Brand: SCOPE SAVER

## (undated) DEVICE — LARGE, DISPOSABLE ALEXIS O C-SECTION PROTECTOR - RETRACTOR: Brand: ALEXIS ® O C-SECTION PROTECTOR - RETRACTOR

## (undated) DEVICE — VIOLET BRAIDED (POLYGLACTIN 910), SYNTHETIC ABSORBABLE SUTURE: Brand: COATED VICRYL

## (undated) DEVICE — KIT CLEAN ENDOKIT 1.1OZ GOWNX2

## (undated) DEVICE — METZENBAUM ADTEC SINGLE USE DISSECTING SCISSORS, SHAFT ONLY, MONOPOLAR, CURVED TO LEFT, WORKING LENGTH: 16 1/2", (420 MM), DIAM. 5 MM, INSULATED, DOUBLE ACTION, STERILE, DISPOSABLE, PACKAGE OF 10 PIECES: Brand: AESCULAP

## (undated) DEVICE — LAP CHOLE: Brand: MEDLINE INDUSTRIES, INC.

## (undated) DEVICE — SLIM BODY SKIN STAPLER: Brand: APPOSE ULC

## (undated) DEVICE — TROCAR: Brand: KII® SLEEVE

## (undated) NOTE — LETTER
05/15/19    RE: Carlos A Alex    : 3/23/1980    To Whom It May Concern:    Our patient, Carlos A Alex,      __x__Has received treatment in our office on __05/15/2019________________.        _____Has been hospitalized on the following dates _______________

## (undated) NOTE — Clinical Note
Continue care with Dr. Santi Florezison glucoses 4x/day and review with OB weekly  Monthly growth US in third trimester, add BPP to each growth ultrasound starting at 32 weeks  Weekly NST at 32 wks / twice weekly at  34  wks      Hgb A1C q trimest

## (undated) NOTE — Clinical Note
Thank you for the consult. I saw Ms. Hesham Morley in the endocrine/diabetes clinic today. Please see attached my note. Please feel free to contact me with any questions. Thanks!

## (undated) NOTE — Clinical Note
BI, TCM call made, see [facility] notes. [facility] Confirmed PCP office TCM appointment with patient on 1/19/2024 at 4:30 pm with Dr Ledesma.

## (undated) NOTE — Clinical Note
Montserrat. I saw MsForest Micheal Alvares in the endocrine/diabetes clinic today. Please see attached my note. Please feel free to contact me with any questions. Thanks!

## (undated) NOTE — LETTER
May 14, 2019    Monica Weinstein, [address] [facility]     Patient: Jon Bianchi   YOB: 1980   Date of Visit: 5/14/2019       Dear Dr. Saravanan Pelayo MD:    Thank you for referring Jon Bianchi to me for evaluatio Social History: Social History    Tobacco Use      Smoking status: Never Smoker      Smokeless tobacco: Never Used    Alcohol use: No      Frequency: Never    Drug use: No      Medications:    Current Outpatient Medications:  Labetalol HCl 300 MG Oral Tab Vitreous Vitreous floaters Vitreous floaters          Fundus Exam       Right Left    Disc Good rim Good rim    C/D Ratio 0.1 0.1    Macula Normal, no BDR  Normal, no BDR     Vessels Normal Normal    Periphery lattice inferior  lattice inferior

## (undated) NOTE — LETTER
:  3/23/1980      To Whom It May Concern: This patient was seen in our office on 22 for treatment of a medical condition. She shall remain home from work for the time being. Her tentative RTW date is 5/3/22. If this office may be of further assistance, please do not hesitate to contact us.       Sincerely,        Harvey Fonseca DO

## (undated) NOTE — LETTER
05/15/19    RE: Haley Conneaut    : 3/23/1980    To Whom It May Concern:    Our patient, Haley Rain,      _x____Has received treatment in our office on _05/15/2019________________.        _____Has been hospitalized on the following dates _______________

## (undated) NOTE — LETTER
[facility] 3W/SW  [address] [facility] [address]  Dept: [phone]  Loc: [phone]         January 17, 2024    Patient: Yareli Lynch   YOB: 1980   Date of Visit: 1/14/2024       To Whom It May Concern:    Please excuse Yareli Lynch from work because she was seen and treated at [facility] on 1/14-1/17/2024, and needs to recuperate at home. She is going to follow up with her PCP prior returning to work.    If you have any questions or concerns, please don't hesitate to call.      Encounter Provider(s):    Falguni Chapman MD Ghouse, Farah S, MD Wong, Wayne D, MD Potaczek, Barbara E, MD Kilander, Kendall, PA-C George, Resmi Ann, APRN Madonia, Shannon Marie, APRN     12:28 PM  1/17/2024

## (undated) NOTE — LETTER
[place] [address]  Authorization for Invasive Procedures  Date: 01/15/2024           Time: 1620    I hereby authorize Dr. Graham, my physician and his/her assistants (if applicable), which may include medical students, residents, and/or fellows, to perform the following surgical operation/ procedure and administer such anesthesia as may be determined necessary by my physician:  Operation/Procedure name (s)  Cardiac Catheterization, Left Ventricular Cineangiography, Bilateral Selective Coronary Angiography and/or Right Heart Catheterization; possible Percutaneous Transluminal Coronary Angioplasty, Coronary Atherectomy, Coronary Stent, Intracoronary Thrombolytic therapy, Antiplatelet therapy and/or Intravascular Ultrasound on Yareli PEDERSEN Lynch   2.   I recognize that during the surgical operation/procedure, unforeseen conditions may necessitate additional or different procedures than those listed above.  I, therefore, further authorize and request that the above-named surgeon, assistants, or designees perform such procedures as are, in their judgment, necessary and desirable.    3.   My surgeon/physician has discussed prior to my surgery the potential benefits, risks and side effects of this procedure; the likelihood of achieving goals; and potential problems that might occur during recuperation.  They also discussed reasonable alternatives to the procedure, including risks, benefits, and side effects related to the alternatives and risks related to not receiving this procedure.  I have had all my questions answered and I acknowledge that no guarantee has been made as to the result that may be obtained.    4.   Should the need arise during my operation/procedure, which includes change of level of care prior to discharge, I also consent to the administration of blood and/or blood products.  Further, I understand that despite careful testing and screening of blood or blood products by  collecting agencies, I may still be subject to ill effects as a result of receiving a blood transfusion and/or blood products.  The following are some, but not all, of the potential risks that can occur: fever and allergic reactions, hemolytic reactions, transmission of diseases such as Hepatitis, AIDS and Cytomegalovirus (CMV) and fluid overload.  In the event that I wish to have an autologous transfusion of my own blood, or a directed donor transfusion, I will discuss this with my physician.  Check only if Refusing Blood or Blood Products  I understand refusal of blood or blood products as deemed necessary by my physician may have serious consequences to my condition to include possible death. I hereby assume responsibility for my refusal and release the hospital, its personnel, and my physicians from any responsibility for the consequences of my refusal.          o  Refuse      5.   I authorize the use of any specimen, organs, tissues, body parts or foreign objects that may be removed from my body during the operation/procedure for diagnosis, research or teaching purposes and their subsequent disposal by hospital authorities.  I also authorize the release of specimen test results and/or written reports to my treating physician on the hospital medical staff or other referring or consulting physicians involved in my care, at the discretion of the Pathologist or my treating physician.    6.   I consent to the photographing or videotaping of the operations or procedures to be performed, including appropriate portions of my body for medical, scientific, or educational purposes, provided my identity is not revealed by the pictures or by descriptive texts accompanying them.  If the procedure has been photographed/videotaped, the surgeon will obtain the original picture, image, videotape or CD.  The hospital will not be responsible for storage, release or maintenance of the picture, image, tape or CD.    7.   I consent  to the presence of a  or observers in the operating room as deemed necessary by my physician or their designees.    8.   I recognize that in the event my procedure results in extended X-Ray/fluoroscopy time, I may develop a skin reaction.    9. If I have a Do Not Attempt Resuscitation (DNAR) order in place, that status will be suspended while in the operating room, procedural suite, and during the recovery period unless otherwise explicitly stated by me (or a person authorized to consent on my behalf). The surgeon or my attending physician will determine when the applicable recovery period ends for purposes of reinstating the DNAR order.  10. Patients having a sterilization procedure: I understand that if the procedure is successful the results will be permanent and it will therefore be impossible for me to inseminate, conceive, or bear children.  I also understand that the procedure is intended to result in sterility, although the result has not been guaranteed.   11. I acknowledge that my physician has explained sedation/analgesia administration to me including the risk and benefits I consent to the administration of sedation/analgesia as may be necessary or desirable in the judgment of my physician.    I CERTIFY THAT I HAVE READ AND FULLY UNDERSTAND THE ABOVE CONSENT TO OPERATION and/or OTHER PROCEDURE.        ____________________________________       _________________________________      ______________________________  Signature of Patient         Signature of Responsible Person        Printed Name of Responsible Person    ____________________________________      _________________________________      ______________________________       Signature of Witness          Relationship to Patient                       Date                                       Time  Patient Name: Yareli PEDERSEN Syed     : 3/23/1980                 Printed: January 15, 2024      Medical Record #: [id number]                       Page 1 of 2           STATEMENT OF PHYSICIAN My signature below affirms that prior to the time of the procedure; I have explained to the patient and/or his/her legal representative, the risks and benefits involved in the proposed treatment and any reasonable alternative to the proposed treatment. I have also explained the risks and benefits involved in refusal of the proposed treatment and alternatives to the proposed treatment and have answered the patient's questions. If I have a significant financial interest in a co-management agreement or a significant financial interest in any product or implant, or other significant relationship used in this procedure/surgery, I have disclosed this and had a discussion with my patient.     _______________________________________________________________ _____________________________  (Signature of Physician)                                                                                         (Date)                                   (Time)  Patient Name: Yareli Lynch     : 3/23/1980                 Printed: January 15, 2024      Medical Record #: [id number]                      Page 2 of 2

## (undated) NOTE — LETTER
1/26/2024      To Whom It May Concern:    Yareli Lynch is currently under the medical care of our office.   She may return to work on 1/29/23.   Activity is restricted as follows: none.    If you require additional information please contact our office.      Sincerely,  Dr Valencia Machado, DO  [address] [place] [address]   [phone]      Document generated by:  Rochelle BUITRAGO RN

## (undated) NOTE — LETTER
[facility]  Administration of Anesthesia  I, Yareli NUVIA LechugaLynch agree to be cared for by a physician anesthesiologist alone and/or with a nurse anesthetist, who is specially trained to monitor me and give me medicine to put me to sleep or keep me comfortable during my procedure    I understand that my anesthesiologist and/or anesthetist is not an employee or agent of [facility] or [employer]. He or she works for [facility]    As the patient asking for anesthesia services, I agree to:  Allow the anesthesiologist (anesthesia doctor) to give me medicine and do additional procedures as necessary. Some examples are: Starting or using an “IV” to give me medicine, fluids or blood during my procedure, and having a breathing tube placed to help me breathe when I’m asleep (intubation). In the event that my heart stops working properly, I understand that my anesthesiologist will make every effort to sustain my life, unless otherwise directed by [facility] Do Not Resuscitate documents.  Tell my anesthesia doctor before my procedure:  If I am pregnant.  The last time that I ate or drank.  iii. All of the medicines I take (including prescriptions, herbal supplements, and pills I can buy without a prescription (including street drugs/illegal medications). Failure to inform my anesthesiologist about these medicines may increase my risk of anesthetic complications.  iv.If I am allergic to anything or have had a reaction to anesthesia before.  I understand how the anesthesia medicine will help me (benefits).  I understand that with any type of anesthesia medicine there are risks:  The most common risks are: nausea, vomiting, sore throat, muscle soreness, damage to my eyes, mouth, or teeth (from breathing tube placement).  Rare risks include: remembering what happened during my procedure, allergic reactions to medications, injury to my airway, heart, lungs, vision, nerves, or  muscles and in extremely rare instances death.  My doctor has explained to me other choices available to me for my care (alternatives).  Pregnant Patients (“epidural”):  I understand that the risks of having an epidural (medicine given into my back to help control pain during labor), include itching, low blood pressure, difficulty urinating, headache or slowing of the baby’s heart. Very rare risks include infection, bleeding, seizure, irregular heart rhythms and nerve injury.  Regional Anesthesia (“spinal”, “epidural”, & “nerve blocks”):  I understand that rare but potential complications include headache, bleeding, infection, seizure, irregular heart rhythms, and nerve injury.    _____________________________________________________________________________  Patient (or Representative) Signature/Relationship to Patient  Date   Time    _____________________________________________________________________________   Name (if used)    Language/Organization   Time    _____________________________________________________________________________  Nurse Anesthetist Signature     Date   Time  _____________________________________________________________________________  Anesthesiologist Signature     Date   Time  I have discussed the procedure and information above with the patient (or patient’s representative) and answered their questions. The patient or their representative has agreed to have anesthesia services.    _____________________________________________________________________________  Witness        Date   Time  I have verified that the signature is that of the patient or patient’s representative, and that it was signed before the procedure  Patient Name: Yareli Lynch     : 3/23/1980                 Printed: 2024 at 7:16 AM    Medical Record #: [id number]                                            Page 1 of 1  ----------ANESTHESIA CONSENT----------

## (undated) NOTE — LETTER
19    RE: Emerita Reeves    : 3/23/1980    To Whom It May Concern:    Our patient, Emerita Gouldfred,      __x___Has received treatment in our office on _2019________________.        _____Has been hospitalized on the following dates _______________

## (undated) NOTE — LETTER
19    RE: Marce Yin    : 3/23/1980    To Whom It May Concern:    Our patient, Marce Yin,      __x___Has received treatment in our office on __2019_______________.        _____Has been hospitalized on the following dates _______________

## (undated) NOTE — Clinical Note
Continue care with Dr. Tejeda  Check glucoses 4x/day and review with OB weekly  Monthly growth and biophysical profile ultrasound  Weekly NST at 32 wks / twice weekly at  34  wks      Hgb A1C q trimester  Home BP monitoring     Aspirin 81-120mg

## (undated) NOTE — Clinical Note
Thanks for the referral.  I recommend switching out her labetolol to another bp medicine. This is a common medicine that causes weight gain.   I also recommend a sleep study  Starting her on Diethylpropion for appetite suppression  Thanks  Renate Wallace

## (undated) NOTE — LETTER
[facility]  Administration of Anesthesia  1. Hever Coles, or _________________________________ acting on her behalf, (Patient) (Dependent/Representative) request to receive anesthesia for my pending procedure/operation/treatment.   A bleeding, seizure, cardiac arrest and death. 7. AWARENESS: I understand that it is possible (but unlikely) to have explicit memory of events from the operating room while under general anesthesia.   8. ELECTROCONVULSIVE THERAPY PATIENTS: This consent serve below affirms that prior to the time of the procedure, I have explained to the patient and/or his/her guardian, the risks and benefits of undergoing anesthesia, as well as any reasonable alternatives.     ___________________________________________________

## (undated) NOTE — LETTER
:  3/23/1980      To Whom It May Concern: This patient was seen in our office on 22. She should continue maternity leave for a total of 12 weeks. If this office may be of further assistance, please do not hesitate to contact us.       Sincerely,        Chapito Vela DO

## (undated) NOTE — LETTER
[address], [place], [place]  Authorization for Surgical Operation and Procedure                                                                                           I hereby authorize Olive Ny MD, my physician and his/her assistants (if applicable), which may include medical students, residents, and/or fellows, to perform the following surgical operation/ procedure and administer such anesthesia as may be determined necessary by my physician: Operation/Procedure name (s) COLONOSCOPY/ ESOPHAGOGASTRODUODENOSCOPY (EGD) on Yareli Lynch   2.   I recognize that during the surgical operation/procedure, unforeseen conditions may necessitate additional or different procedures than those listed above.  I, therefore, further authorize and request that the above-named surgeon, assistants, or designees perform such procedures as are, in their judgment, necessary and desirable.    3.   My surgeon/physician has discussed prior to my surgery the potential benefits, risks and side effects of this procedure; the likelihood of achieving goals; and potential problems that might occur during recuperation.  They also discussed reasonable alternatives to the procedure, including risks, benefits, and side effects related to the alternatives and risks related to not receiving this procedure.  I have had all my questions answered and I acknowledge that no guarantee has been made as to the result that may be obtained.    4.   Should the need arise during my operation/procedure, which includes change of level of care prior to discharge, I also consent to the administration of blood and/or blood products.  Further, I understand that despite careful testing and screening of blood or blood products by collecting agencies, I may still be subject to ill effects as a result of receiving a blood transfusion and/or blood products.  The following are some, but not all, of the potential  risks that can occur: fever and allergic reactions, hemolytic reactions, transmission of diseases such as Hepatitis, AIDS and Cytomegalovirus (CMV) and fluid overload.  In the event that I wish to have an autologous transfusion of my own blood, or a directed donor transfusion, I will discuss this with my physician.  Check only if Refusing Blood or Blood Products  I understand refusal of blood or blood products as deemed necessary by my physician may have serious consequences to my condition to include possible death. I hereby assume responsibility for my refusal and release the hospital, its personnel, and my physicians from any responsibility for the consequences of my refusal.    o  Refuse   5.   I authorize the use of any specimen, organs, tissues, body parts or foreign objects that may be removed from my body during the operation/procedure for diagnosis, research or teaching purposes and their subsequent disposal by hospital authorities.  I also authorize the release of specimen test results and/or written reports to my treating physician on the hospital medical staff or other referring or consulting physicians involved in my care, at the discretion of the Pathologist or my treating physician.    6.   I consent to the photographing or videotaping of the operations or procedures to be performed, including appropriate portions of my body for medical, scientific, or educational purposes, provided my identity is not revealed by the pictures or by descriptive texts accompanying them.  If the procedure has been photographed/videotaped, the surgeon will obtain the original picture, image, videotape or CD.  The hospital will not be responsible for storage, release or maintenance of the picture, image, tape or CD.    7.   I consent to the presence of a  or observers in the operating room as deemed necessary by my physician or their designees.    8.   I recognize that in the event my procedure results in  extended X-Ray/fluoroscopy time, I may develop a skin reaction.    9. If I have a Do Not Attempt Resuscitation (DNAR) order in place, that status will be suspended while in the operating room, procedural suite, and during the recovery period unless otherwise explicitly stated by me (or a person authorized to consent on my behalf). The surgeon or my attending physician will determine when the applicable recovery period ends for purposes of reinstating the DNAR order.  10. Patients having a sterilization procedure: I understand that if the procedure is successful the results will be permanent and it will therefore be impossible for me to inseminate, conceive, or bear children.  I also understand that the procedure is intended to result in sterility, although the result has not been guaranteed.   11. I acknowledge that my physician has explained sedation/analgesia administration to me including the risk and benefits I consent to the administration of sedation/analgesia as may be necessary or desirable in the judgment of my physician.    I CERTIFY THAT I HAVE READ AND FULLY UNDERSTAND THE ABOVE CONSENT TO OPERATION and/or OTHER PROCEDURE.     _________________________________________ _________________________________     ___________________________________  Signature of Patient     Signature of Responsible Person                   Printed Name of Responsible Person                              _________________________________________ ______________________________        ___________________________________  Signature of Witness         Date  Time         Relationship to Patient    STATEMENT OF PHYSICIAN My signature below affirms that prior to the time of the procedure; I have explained to the patient and/or his/her legal representative, the risks and benefits involved in the proposed treatment and any reasonable alternative to the proposed treatment. I have also explained the risks and benefits involved in refusal of  the proposed treatment and alternatives to the proposed treatment and have answered the patient's questions. If I have a significant financial interest in a co-management agreement or a significant financial interest in any product or implant, or other significant relationship used in this procedure/surgery, I have disclosed this and had a discussion with my patient.     _______________________________________________________________ _____________________________  (Signature of Physician)                                                                                         (Date)                                   (Time)  Patient Name: Yareil PEDERSEN Syed    : 3/23/1980   Printed: 2024      Medical Record #: [id number]                                              Page 1 of 1

## (undated) NOTE — Clinical Note
1.  Weekly NST at  32  wks / twice weekly at 34   wks   2. Monthly growth US  3. Baseline 24 hr urine   4. EKG   5. Ophthalmology exam 6. Fetal heart study in 2-4 wks   7. Continue Labetalol and Metformin8.   Plan delivery by 38 wks

## (undated) NOTE — Clinical Note
Weekly NST at 32 weeks, increase to twice weekly at 34 weeksMonthly growth ultrasoundHome blood pressure monitoring 2-3 times per weekEither send a 3-hour glucose tolerance test or advised the patient to begin testing her blood sugars 4 times dailyDelivery

## (undated) NOTE — LETTER
Dear new mom:    Sorry, we missed you! The nurses of [facility]’s [facility] have tried to reach you by phone to ask if you have any questions regarding your health or the health and care of your new little one.     We hope you are doing moms and their babies (up to 7 months of age). Relatives and friends are welcome to attend with the new mom. Includes breastfeeding support with an IBCLC (lactation consultant) who is available to answer your breastfeeding questions.     Mom & Baby Hour (El go to [email or web address]/schedule or call [phone].  Fitness at [address]: [phone]  • Trudi: [phone]    Facebook Groups  [employer] Moms—Celebrating motherhood.  For expec